# Patient Record
Sex: MALE | Race: WHITE | Employment: OTHER | URBAN - METROPOLITAN AREA
[De-identification: names, ages, dates, MRNs, and addresses within clinical notes are randomized per-mention and may not be internally consistent; named-entity substitution may affect disease eponyms.]

---

## 2018-02-19 ENCOUNTER — OFFICE VISIT (OUTPATIENT)
Dept: PODIATRY | Age: 78
End: 2018-02-19
Payer: MEDICARE

## 2018-02-19 VITALS
WEIGHT: 290 LBS | SYSTOLIC BLOOD PRESSURE: 126 MMHG | DIASTOLIC BLOOD PRESSURE: 76 MMHG | BODY MASS INDEX: 40.6 KG/M2 | HEIGHT: 71 IN

## 2018-02-19 DIAGNOSIS — E11.51 TYPE II DIABETES MELLITUS WITH PERIPHERAL CIRCULATORY DISORDER (HCC): Primary | ICD-10-CM

## 2018-02-19 DIAGNOSIS — I73.9 PVD (PERIPHERAL VASCULAR DISEASE) (HCC): ICD-10-CM

## 2018-02-19 DIAGNOSIS — M79.672 PAIN IN BOTH FEET: ICD-10-CM

## 2018-02-19 DIAGNOSIS — B35.1 DERMATOPHYTOSIS OF NAIL: ICD-10-CM

## 2018-02-19 DIAGNOSIS — M79.671 PAIN IN BOTH FEET: ICD-10-CM

## 2018-02-19 PROCEDURE — 1036F TOBACCO NON-USER: CPT | Performed by: PODIATRIST

## 2018-02-19 PROCEDURE — 4040F PNEUMOC VAC/ADMIN/RCVD: CPT | Performed by: PODIATRIST

## 2018-02-19 PROCEDURE — 11721 DEBRIDE NAIL 6 OR MORE: CPT | Performed by: PODIATRIST

## 2018-02-19 PROCEDURE — G8484 FLU IMMUNIZE NO ADMIN: HCPCS | Performed by: PODIATRIST

## 2018-02-19 PROCEDURE — G8428 CUR MEDS NOT DOCUMENT: HCPCS | Performed by: PODIATRIST

## 2018-02-19 PROCEDURE — 99213 OFFICE O/P EST LOW 20 MIN: CPT | Performed by: PODIATRIST

## 2018-02-19 PROCEDURE — G8417 CALC BMI ABV UP PARAM F/U: HCPCS | Performed by: PODIATRIST

## 2018-02-19 PROCEDURE — 1123F ACP DISCUSS/DSCN MKR DOCD: CPT | Performed by: PODIATRIST

## 2018-09-11 ENCOUNTER — OFFICE VISIT (OUTPATIENT)
Dept: PODIATRY | Age: 78
End: 2018-09-11
Payer: MEDICARE

## 2018-09-11 VITALS — HEIGHT: 69 IN | RESPIRATION RATE: 16 BRPM | WEIGHT: 292 LBS | BODY MASS INDEX: 43.25 KG/M2

## 2018-09-11 DIAGNOSIS — I73.9 PVD (PERIPHERAL VASCULAR DISEASE) (HCC): ICD-10-CM

## 2018-09-11 DIAGNOSIS — E11.51 TYPE II DIABETES MELLITUS WITH PERIPHERAL CIRCULATORY DISORDER (HCC): Primary | ICD-10-CM

## 2018-09-11 DIAGNOSIS — B35.1 DERMATOPHYTOSIS OF NAIL: ICD-10-CM

## 2018-09-11 DIAGNOSIS — M79.671 BILATERAL FOOT PAIN: ICD-10-CM

## 2018-09-11 DIAGNOSIS — M79.672 BILATERAL FOOT PAIN: ICD-10-CM

## 2018-09-11 PROCEDURE — 99213 OFFICE O/P EST LOW 20 MIN: CPT | Performed by: PODIATRIST

## 2018-09-11 PROCEDURE — 4040F PNEUMOC VAC/ADMIN/RCVD: CPT | Performed by: PODIATRIST

## 2018-09-11 PROCEDURE — 1123F ACP DISCUSS/DSCN MKR DOCD: CPT | Performed by: PODIATRIST

## 2018-09-11 PROCEDURE — 1036F TOBACCO NON-USER: CPT | Performed by: PODIATRIST

## 2018-09-11 PROCEDURE — G8427 DOCREV CUR MEDS BY ELIG CLIN: HCPCS | Performed by: PODIATRIST

## 2018-09-11 PROCEDURE — G8417 CALC BMI ABV UP PARAM F/U: HCPCS | Performed by: PODIATRIST

## 2018-09-11 PROCEDURE — 11721 DEBRIDE NAIL 6 OR MORE: CPT | Performed by: PODIATRIST

## 2018-09-11 PROCEDURE — 1101F PT FALLS ASSESS-DOCD LE1/YR: CPT | Performed by: PODIATRIST

## 2019-05-09 ENCOUNTER — OFFICE VISIT (OUTPATIENT)
Dept: PODIATRY | Age: 79
End: 2019-05-09
Payer: MEDICARE

## 2019-05-09 VITALS — HEIGHT: 70 IN | RESPIRATION RATE: 16 BRPM | BODY MASS INDEX: 41.8 KG/M2 | WEIGHT: 292 LBS

## 2019-05-09 DIAGNOSIS — M79.671 BILATERAL FOOT PAIN: ICD-10-CM

## 2019-05-09 DIAGNOSIS — E11.42 DIABETIC POLYNEUROPATHY ASSOCIATED WITH TYPE 2 DIABETES MELLITUS (HCC): ICD-10-CM

## 2019-05-09 DIAGNOSIS — M79.672 BILATERAL FOOT PAIN: ICD-10-CM

## 2019-05-09 DIAGNOSIS — I73.9 PVD (PERIPHERAL VASCULAR DISEASE) (HCC): ICD-10-CM

## 2019-05-09 DIAGNOSIS — E11.51 TYPE II DIABETES MELLITUS WITH PERIPHERAL CIRCULATORY DISORDER (HCC): ICD-10-CM

## 2019-05-09 DIAGNOSIS — B35.1 DERMATOPHYTOSIS OF NAIL: Primary | ICD-10-CM

## 2019-05-09 PROCEDURE — 11721 DEBRIDE NAIL 6 OR MORE: CPT | Performed by: PODIATRIST

## 2019-05-09 PROCEDURE — 4040F PNEUMOC VAC/ADMIN/RCVD: CPT | Performed by: PODIATRIST

## 2019-05-09 PROCEDURE — G8417 CALC BMI ABV UP PARAM F/U: HCPCS | Performed by: PODIATRIST

## 2019-05-09 PROCEDURE — G8428 CUR MEDS NOT DOCUMENT: HCPCS | Performed by: PODIATRIST

## 2019-05-09 PROCEDURE — 1123F ACP DISCUSS/DSCN MKR DOCD: CPT | Performed by: PODIATRIST

## 2019-05-09 PROCEDURE — 1036F TOBACCO NON-USER: CPT | Performed by: PODIATRIST

## 2019-05-09 NOTE — PROGRESS NOTES
800 mg by mouth every 6 hours as needed for Pain      acetaminophen (TYLENOL) 500 MG tablet Take 1,000 mg by mouth every 6 hours as needed for Pain       No current facility-administered medications on file prior to visit. Review of Systems    Review of Systems:   History obtained from chart review and the patient  General ROS: negative for - chills, fatigue, fever, night sweats or weight gain  Constitutional: Negative for chills, diaphoresis, fatigue, fever and unexpected weight change. Musculoskeletal: Positive for arthralgias, gait problem and joint swelling. Neurological ROS: negative for - behavioral changes, confusion, headaches or seizures. Negative for weakness and numbness. Dermatological ROS: negative for - mole changes, rash  Cardiovascular: Negative for leg swelling. Gastrointestinal: Negative for constipation, diarrhea, nausea and vomiting. Objective:  General: AAO x 3 in NAD.     Derm  Toenail Description  Sites of Onychomycosis Involvement (Check affected area)  [x] [x] [x] [x] [x] [x] [x] [x] [x] [x]  5 4 3 2 1 1 2 3 4 5                          Right                                        Left    Thickness  [x] [x] [x] [x] [x] [x] [x] [x] [x] [x]  5 4 3 2 1 1 2 3 4 5                         Right                                        Left    Dystrophic Changes   [x] [x] [x] [x] [x] [x] [x] [x] [x] [x]  5 4 3 2 1 1 2 3 4 5                         Right                                        Left    Color   [x] [x] [x] [x] [x] [x] [x] [x] [x] [x]  5 4 3 2 1 1 2 3 4 5                          Right                                        Left    Incurvation/Ingrowin   [] [] [] [] [] [] [] [] [] []  5 4 3 2 1 1 2 3 4 5                         Right                                        Left    Inflammation/Pain   [x] [x] [x] [x] [x] [x] [x] [x] [x] [x]  5 4 3 2 1 1 2 3 4 5                         Right                                        Left      Dermatologic Exam:  Skin lesion/ulceration Absent . Skin No rashes or nodules noted. .       Musculoskeletal:     1st MPJ ROM decreased, Bilateral.  Muscle strength 5/5, Bilateral.  Pain present upon palpation of toenails 1-5, Bilateral. decreased medial longitudinal arch, Bilateral.  Ankle ROM decreased,Bilateral.    Dorsally contracted digits present digits 2, Bilateral.     Vascular: DP and PT pulses palpable 1/4, Bilateral.  CFT <5 seconds, Bilateral.  Hair growth absent to the level of the digits, Bilateral.  Edema present, Bilateral.  Varicosities absent, Bilateral. Erythema absent, Bilateral    Neurological: Sensation diminshed to light touch to level of digits, Bilateral.  Protective sensation intact 6/10 sites via 5.07/10g Raymond-Yael Monofilament, Bilateral.  negative Tinel's, Bilateral.  negative Valleix sign, Bilateral.      Integument: Warm, dry, supple, Bilateral.  Open lesion absent, Bilateral.  Interdigital maceration absent to web spaces 4, Bilateral.  Nails 1-5 left and 1-5 right thickened > 3.0 mm, dystrophic and crumbly, discolored with subungual debris. Fissures absent, Bilateral.     Visual inspection:  Deformity: hammertoe deformity hali feet  amputation: absent  Skin lesions: absent  Edema: right- 2+ pitting edema, left- 2+ pitting edema    Sensory exam:  Monofilament sensation: abnormal - 6/10 via SW 5.07/10g monofilament to the plantar foot bilateral feet    Pulses: abnormal - 1/4 dorsalis pedis pulse and 1/4 Posterior tibial pulse,   Pinprick: Impaired  Proprioception: Impaired  Vibration (128 Hz): Impaired       DM with PVD       [x]Yes    []No      Assessment:  69 y.o. male with:   Diagnosis Orders   1. Dermatophytosis of nail   DIABETES FOOT EXAM    43431 - [place] DEBRIDEMENT OF NAILS, 6 OR MORE   2. Type II diabetes mellitus with peripheral circulatory disorder (HCC)   DIABETES FOOT EXAM    22251 - NM DEBRIDEMENT OF NAILS, 6 OR MORE   3.  PVD (peripheral vascular disease) ([facility])   DIABETES FOOT EXAM

## 2020-01-21 ENCOUNTER — OFFICE VISIT (OUTPATIENT)
Dept: PODIATRY | Age: 80
End: 2020-01-21
Payer: MEDICARE

## 2020-01-21 VITALS — WEIGHT: 280 LBS | HEIGHT: 70 IN | RESPIRATION RATE: 16 BRPM | BODY MASS INDEX: 40.09 KG/M2

## 2020-01-21 PROCEDURE — 4040F PNEUMOC VAC/ADMIN/RCVD: CPT | Performed by: PODIATRIST

## 2020-01-21 PROCEDURE — G8417 CALC BMI ABV UP PARAM F/U: HCPCS | Performed by: PODIATRIST

## 2020-01-21 PROCEDURE — G8427 DOCREV CUR MEDS BY ELIG CLIN: HCPCS | Performed by: PODIATRIST

## 2020-01-21 PROCEDURE — 1036F TOBACCO NON-USER: CPT | Performed by: PODIATRIST

## 2020-01-21 PROCEDURE — 11721 DEBRIDE NAIL 6 OR MORE: CPT | Performed by: PODIATRIST

## 2020-01-21 PROCEDURE — G8484 FLU IMMUNIZE NO ADMIN: HCPCS | Performed by: PODIATRIST

## 2020-01-21 PROCEDURE — 1123F ACP DISCUSS/DSCN MKR DOCD: CPT | Performed by: PODIATRIST

## 2020-01-21 PROCEDURE — 99213 OFFICE O/P EST LOW 20 MIN: CPT | Performed by: PODIATRIST

## 2020-01-21 NOTE — PROGRESS NOTES
PVD       [x]Yes    []No      X rays 3 views left foot:  DJD of the midfoot with no acute findings seen    Assessment:  67 y.o. male with:   Diagnosis Orders   1. Dermatophytosis of nail  79505 - [place] DEBRIDEMENT OF NAILS, 6 OR MORE    HM DIABETES FOOT EXAM   2. Type II diabetes mellitus with peripheral circulatory disorder (HCC)  35012 - [place] DEBRIDEMENT OF NAILS, 6 OR MORE    HM DIABETES FOOT EXAM   3. PVD (peripheral vascular disease) ([address]  52100 - IA DEBRIDEMENT OF NAILS, 6 OR MORE    HM DIABETES FOOT EXAM   4. Bilateral foot pain  44900 - IA DEBRIDEMENT OF NAILS, 6 OR MORE    HM DIABETES FOOT EXAM   5. Primary osteoarthritis of left foot  XR FOOT LEFT (MIN 3 VIEWS)    HM DIABETES FOOT EXAM           Q7   []Yes    []No                Q8   [x]Yes    []No                     Q9   []Yes    []No    Plan:   Pt was evaluated and examined. Patient was given personalized discharge instructions. X ryas 3 views left foot show the above findings. For the DJD  X rays reviewed labs reviewed  Recommend OTC anti inflammatories. RICE therapy if pain worsens after activity   Recommend proper shoe gear with supportive archs. Nails 1-10 were debrided sharply in length and thickness with a nipper and , without incident. Pt will follow up in 9 weeks or sooner if any problems arise. Diagnosis was discussed with the pt and all of their questions were answered in detail. Proper foot hygiene and care was discussed with the pt. Informed patient on proper diabetic foot care and importance of tight glycemic control. Patient to check feet daily and contact the office with any questions/problems/concerns.    Other comorbidity noted and will be managed by PCP.  1/21/2020    Electronically signed by Billy Lujan DPM on 1/21/2020 at 1:23 PM  1/21/2020

## 2020-06-10 PROBLEM — M72.0 DUPUYTREN'S CONTRACTURE OF LEFT HAND: Status: ACTIVE | Noted: 2020-06-10

## 2020-09-01 RX ORDER — LANOLIN ALCOHOL/MO/W.PET/CERES
3 CREAM (GRAM) TOPICAL NIGHTLY PRN
COMMUNITY

## 2020-09-01 RX ORDER — TADALAFIL 10 MG/1
10 TABLET ORAL PRN
Status: ON HOLD | COMMUNITY
End: 2020-09-02

## 2020-09-02 ENCOUNTER — HOSPITAL ENCOUNTER (OUTPATIENT)
Dept: CARDIAC CATH/INVASIVE PROCEDURES | Age: 80
Discharge: HOME | End: 2020-09-02
Attending: INTERNAL MEDICINE | Admitting: INTERNAL MEDICINE
Payer: MEDICARE

## 2020-09-02 VITALS
OXYGEN SATURATION: 98 % | TEMPERATURE: 97.5 F | BODY MASS INDEX: 41.18 KG/M2 | RESPIRATION RATE: 18 BRPM | SYSTOLIC BLOOD PRESSURE: 159 MMHG | DIASTOLIC BLOOD PRESSURE: 69 MMHG | HEART RATE: 48 BPM | WEIGHT: 278 LBS | HEIGHT: 69 IN

## 2020-09-02 LAB
ANION GAP SERPL CALCULATED.3IONS-SCNC: 13 MMOL/L
BUN BLDV-MCNC: 32 MG/DL (ref 8–23)
BUN/CREAT BLD: 28 (ref 9–20)
CALCIUM SERPL-MCNC: 9.4 MG/DL (ref 8.6–10.4)
CHLORIDE BLD-SCNC: 102 MMOL/L (ref 98–107)
CO2: 22 MMOL/L (ref 20–31)
CREAT SERPL-MCNC: 1.13 MG/DL (ref 0.7–1.2)
GFR AFRICAN AMERICAN: >60 ML/MIN
GFR NON-AFRICAN AMERICAN: >60 ML/MIN
GFR SERPL CREATININE-BSD FRML MDRD: ABNORMAL ML/MIN/{1.73_M2}
GFR SERPL CREATININE-BSD FRML MDRD: ABNORMAL ML/MIN/{1.73_M2}
GLUCOSE BLD-MCNC: 153 MG/DL (ref 70–99)
HCT VFR BLD CALC: 42.3 % (ref 40.7–50.3)
HEMOGLOBIN: 13.8 G/DL (ref 13–17)
MCH RBC QN AUTO: 28.3 PG (ref 25.2–33.5)
MCHC RBC AUTO-ENTMCNC: 32.6 G/DL (ref 28.4–34.8)
MCV RBC AUTO: 86.7 FL (ref 82.6–102.9)
NRBC AUTOMATED: 0 PER 100 WBC
PDW BLD-RTO: 14.6 % (ref 11.8–14.4)
PLATELET # BLD: 195 K/UL (ref 138–453)
PMV BLD AUTO: 9.6 FL (ref 8.1–13.5)
POTASSIUM SERPL-SCNC: 4.4 MMOL/L (ref 3.7–5.3)
RBC # BLD: 4.88 M/UL (ref 4.21–5.77)
SODIUM BLD-SCNC: 137 MMOL/L (ref 135–144)
WBC # BLD: 7.6 K/UL (ref 3.5–11.3)

## 2020-09-02 PROCEDURE — 2580000003 HC RX 258: Performed by: INTERNAL MEDICINE

## 2020-09-02 PROCEDURE — 6360000004 HC RX CONTRAST MEDICATION

## 2020-09-02 PROCEDURE — 93459 L HRT ART/GRFT ANGIO: CPT | Performed by: INTERNAL MEDICINE

## 2020-09-02 PROCEDURE — 36415 COLL VENOUS BLD VENIPUNCTURE: CPT

## 2020-09-02 PROCEDURE — 7100000000 HC PACU RECOVERY - FIRST 15 MIN

## 2020-09-02 PROCEDURE — 85027 COMPLETE CBC AUTOMATED: CPT

## 2020-09-02 PROCEDURE — 80048 BASIC METABOLIC PNL TOTAL CA: CPT

## 2020-09-02 PROCEDURE — 6370000000 HC RX 637 (ALT 250 FOR IP): Performed by: INTERNAL MEDICINE

## 2020-09-02 PROCEDURE — C1894 INTRO/SHEATH, NON-LASER: HCPCS

## 2020-09-02 PROCEDURE — 7100000001 HC PACU RECOVERY - ADDTL 15 MIN

## 2020-09-02 PROCEDURE — 93458 L HRT ARTERY/VENTRICLE ANGIO: CPT | Performed by: INTERNAL MEDICINE

## 2020-09-02 PROCEDURE — 2709999900 HC NON-CHARGEABLE SUPPLY

## 2020-09-02 RX ORDER — ASPIRIN 81 MG/1
81 TABLET, CHEWABLE ORAL ONCE
Status: COMPLETED | OUTPATIENT
Start: 2020-09-02 | End: 2020-09-02

## 2020-09-02 RX ORDER — ATROPINE SULFATE 0.1 MG/ML
INJECTION INTRAVENOUS
Status: DISCONTINUED
Start: 2020-09-02 | End: 2020-09-02 | Stop reason: WASHOUT

## 2020-09-02 RX ORDER — SODIUM CHLORIDE 9 MG/ML
INJECTION, SOLUTION INTRAVENOUS CONTINUOUS
Status: DISCONTINUED | OUTPATIENT
Start: 2020-09-02 | End: 2020-09-08 | Stop reason: HOSPADM

## 2020-09-02 RX ORDER — AMLODIPINE BESYLATE 5 MG/1
5 TABLET ORAL DAILY
Qty: 30 TABLET | Refills: 3 | Status: SHIPPED | OUTPATIENT
Start: 2020-09-02 | End: 2020-11-16

## 2020-09-02 RX ORDER — ACETAMINOPHEN 325 MG/1
650 TABLET ORAL EVERY 4 HOURS PRN
Status: CANCELLED | OUTPATIENT
Start: 2020-09-02

## 2020-09-02 RX ORDER — SODIUM CHLORIDE 0.9 % (FLUSH) 0.9 %
10 SYRINGE (ML) INJECTION PRN
Status: CANCELLED | OUTPATIENT
Start: 2020-09-02

## 2020-09-02 RX ORDER — SODIUM CHLORIDE 0.9 % (FLUSH) 0.9 %
10 SYRINGE (ML) INJECTION EVERY 12 HOURS SCHEDULED
Status: CANCELLED | OUTPATIENT
Start: 2020-09-02

## 2020-09-02 RX ADMIN — ASPIRIN 81 MG: 81 TABLET, CHEWABLE ORAL at 09:06

## 2020-09-02 RX ADMIN — SODIUM CHLORIDE: 9 INJECTION, SOLUTION INTRAVENOUS at 09:03

## 2020-09-02 ASSESSMENT — PAIN SCALES - GENERAL
PAINLEVEL_OUTOF10: 0

## 2020-09-02 ASSESSMENT — PAIN - FUNCTIONAL ASSESSMENT: PAIN_FUNCTIONAL_ASSESSMENT: 0-10

## 2020-09-18 ENCOUNTER — NURSE TRIAGE (OUTPATIENT)
Dept: OTHER | Facility: CLINIC | Age: 80
End: 2020-09-18

## 2020-09-18 NOTE — TELEPHONE ENCOUNTER
Reason for Disposition   MODERATE pain (e.g., interferes with normal activities, limping) and present > 3 days    Answer Assessment - Initial Assessment Questions  1. ONSET: \"When did the pain start?\"       1 week ago  2. LOCATION: \"Where is the pain located? \"   (e.g., around nail, entire toe, at foot joint)       Left big toe  3. PAIN: \"How bad is the pain? \"    (Scale 1-10; or mild, moderate, severe)    -  MILD (1-3): doesn't interfere with normal activities     -  MODERATE (4-7): interferes with normal activities (e.g., work or school) or awakens from sleep, limping     -  SEVERE (8-10): excruciating pain, unable to do any normal activities, unable to walk      moderate  4. APPEARANCE: \"What does the toe look like? \" (e.g., redness, swelling, bruising, pallor)      Swollen, dark around nail  5. CAUSE: \"What do you think is causing the toe pain? \"      Maybe fungus  6. OTHER SYMPTOMS: \"Do you have any other symptoms? \" (e.g., leg pain, rash, fever, numbness)      no  7. PREGNANCY: \"Is there any chance you are pregnant? \" \"When was your last menstrual period? \"      n/a    Protocols used: TOE PAIN-ADULT-[place]    Patient called due to left toe pain and swelling . Warm transfer to scheduling center. All care advice reviewed with patient, all questions and concerns answered. Caller provided care advice and instructed to call back with worsening symptoms. Please do not respond to the triage nurse through this encounter. Any subsequent communication should be directly with the patient.

## 2020-09-21 ENCOUNTER — OFFICE VISIT (OUTPATIENT)
Dept: PODIATRY | Age: 80
End: 2020-09-21
Payer: MEDICARE

## 2020-09-21 VITALS — TEMPERATURE: 97 F | HEIGHT: 69 IN | WEIGHT: 297 LBS | BODY MASS INDEX: 43.99 KG/M2 | RESPIRATION RATE: 16 BRPM

## 2020-09-21 PROCEDURE — 1123F ACP DISCUSS/DSCN MKR DOCD: CPT | Performed by: PODIATRIST

## 2020-09-21 PROCEDURE — 17110 DESTRUCTION B9 LES UP TO 14: CPT | Performed by: PODIATRIST

## 2020-09-21 PROCEDURE — 1036F TOBACCO NON-USER: CPT | Performed by: PODIATRIST

## 2020-09-21 PROCEDURE — G8427 DOCREV CUR MEDS BY ELIG CLIN: HCPCS | Performed by: PODIATRIST

## 2020-09-21 PROCEDURE — 4040F PNEUMOC VAC/ADMIN/RCVD: CPT | Performed by: PODIATRIST

## 2020-09-21 PROCEDURE — 11721 DEBRIDE NAIL 6 OR MORE: CPT | Performed by: PODIATRIST

## 2020-09-21 PROCEDURE — G8417 CALC BMI ABV UP PARAM F/U: HCPCS | Performed by: PODIATRIST

## 2020-09-21 PROCEDURE — 99213 OFFICE O/P EST LOW 20 MIN: CPT | Performed by: PODIATRIST

## 2020-09-21 NOTE — PROGRESS NOTES
[facility]  [facility]  [address] [place] [address]  Dept: [phone]  Dept Fax: [phone]    DIABETIC PROGRESS NOTE  Date of patient's visit: 9/21/2020  Patient's Name:  Rosa Ruby YOB: 1940            Patient Care Team:  Alia Lakhani MD as PCP - 8 Aleksandr Jones DPM as Physician (Podiatry)          Chief Complaint   Patient presents with    Toe Pain       Subjective:   Rosa Ruby comes to clinic for Toe Pain    he is a diabetic and states that  He checks sugar regularly. Pt currently has complaint of thickened, elongated nails that they cannot manage by themselves. Pt's primary care physician is Alia Lakhani MD last seen 9/10/20   Pt's last blood sugar was  153. Pt has a new complaint of right great toe nail and increased painful skin lesion to right foot. Pt has tried changing shoes but it has not helped the pain     No results found for: LABA1C   Complains of numbness in the feet bilat.   Past Medical History:   Diagnosis Date    Atrial fibrillation ([facility] [address]     CAD (coronary artery disease)     CHF (congestive heart failure) (HCC)     Diabetes (HCC)     Diverticulosis     Hyperlipidemia     Obesity        Allergies   Allergen Reactions    Lisinopril      Current Outpatient Medications on File Prior to Visit   Medication Sig Dispense Refill    amLODIPine (NORVASC) 5 MG tablet Take 1 tablet by mouth daily 30 tablet 3    apixaban (ELIQUIS) 5 MG TABS tablet Take 5 mg by mouth 2 times daily      melatonin 3 MG TABS tablet Take 3 mg by mouth nightly as needed      fenofibrate (TRICOR) 145 MG tablet       glimepiride (AMARYL) 4 MG tablet       metFORMIN (GLUCOPHAGE) 500 MG tablet Take 500 mg by mouth 2 times daily (with meals)      aspirin 81 MG tablet Take 81 mg by mouth daily      atorvastatin (LIPITOR) 80 MG tablet Take 80 mg by mouth daily      VITAMIN D, CHOLECALCIFEROL, PO Take by mouth      B Complex Vitamins (VITAMIN B COMPLEX PO) Take by mouth      magnesium 30 MG tablet Take 30 mg by mouth 2 times daily      Omega-3 Fatty Acids (FISH OIL) 1000 MG CAPS Take 3,000 mg by mouth 3 times daily      Cranberry 1000 MG CAPS Take by mouth      ibuprofen (ADVIL;MOTRIN) 200 MG tablet Take 800 mg by mouth every 6 hours as needed for Pain      acetaminophen (TYLENOL) 500 MG tablet Take 1,000 mg by mouth every 6 hours as needed for Pain       No current facility-administered medications on file prior to visit. Review of Systems    Review of Systems:   History obtained from chart review and the patient  General ROS: negative for - chills, fatigue, fever, night sweats or weight gain  Constitutional: Negative for chills, diaphoresis, fatigue, fever and unexpected weight change. Musculoskeletal: Positive for arthralgias, gait problem and joint swelling. Neurological ROS: negative for - behavioral changes, confusion, headaches or seizures. Negative for weakness and numbness. Dermatological ROS: negative for - mole changes, rash  Cardiovascular: Negative for leg swelling. Gastrointestinal: Negative for constipation, diarrhea, nausea and vomiting. Objective:  Dermatologic Exam:  Soft tissue lesion to the plantar right foot sub 5th MTH with central core and petechiae. Pain on palpation of lesion. Right hallux nail is incurvated with increased edema.  No pus     Skin is thin, with flaky sloughing skin as well as decreased hair growth to the lower leg  Small red hemosiderin deposits seen dorsal foot   Musculoskeletal:     1st MPJ ROM decreased, Bilateral.  Muscle strength 5/5, Bilateral.  Pain present upon palpation of toenails 1-5, Bilateral. decreased medial longitudinal arch, Bilateral.  Ankle ROM decreased,Bilateral.    Dorsally contracted digits present digits 2, Bilateral.     Vascular: DP pulses 1/4 bilateral.  PT pulses 0/4 bilateral.   CFT <5 seconds, Bilateral.  Hair growth absent to the level of the digits, Bilateral.  Edema present, Bilateral.  Varicosities absent, Bilateral. Erythema absent, Bilateral    Neurological: Sensation diminshed to light touch to level of digits, Bilateral.  Protective sensation intact 6/10 sites via 5.07/10g Wells-Yael Monofilament, Bilateral.  negative Tinel's, Bilateral.  negative Valleix sign, Bilateral.      Integument: Warm, dry, supple, Bilateral.  Open lesion absent, Bilateral.  Interdigital maceration absent to web spaces 4, Bilateral.  Nails 1-5 left and 1-5 right thickened > 3.0 mm, dystrophic and crumbly, discolored with subungual debris. Fissures absent, Bilateral.   General: AAO x 3 in NAD.     Derm  Toenail Description  Sites of Onychomycosis Involvement (Check affected area)  [x] [x] [x] [x] [x] [x] [x] [x] [x] [x]  5 4 3 2 1 1 2 3 4 5                          Right                                        Left    Thickness  [x] [x] [x] [x] [x] [x] [x] [x] [x] [x]  5 4 3 2 1 1 2 3 4 5                         Right                                        Left    Dystrophic Changes   [x] [x] [x] [x] [x] [x] [x] [x] [x] [x]  5 4 3 2 1 1 2 3 4 5                         Right                                        Left    Color   [x] [x] [x] [x] [x] [x] [x] [x] [x] [x]  5 4 3 2 1 1 2 3 4 5                          Right                                        Left    Incurvation/Ingrowin   [] [] [] [] [] [] [] [] [] []  5 4 3 2 1 1 2 3 4 5                         Right                                        Left    Inflammation/Pain   [x] [x] [x] [x] [x] [x] [x] [x] [x] [x]  5 4 3 2 1 1 2 3 4 5                         Right                                        Left        Visual inspection:  Deformity: hammertoe deformity hali feet  amputation: absent  Skin lesions: as above  Edema: right- 2+ pitting edema, left- 2+ pitting edema    Sensory exam:  Monofilament sensation: abnormal - 6/10 via SW 5.07/10g monofilament to the plantar foot bilateral feet    Pulses: abnormal - 1/4 dorsalis pedis pulse and 1/4 Posterior tibial pulse,   Pinprick: Impaired  Proprioception: Impaired  Vibration (128 Hz): Impaired       DM with PVD       [x]Yes    []No      Assessment:  79 y.o. male with:   Diagnosis Orders   1. Type II diabetes mellitus with peripheral circulatory disorder (HCC)  92757 - CT DEBRIDEMENT OF NAILS, 6 OR MORE   2. PVD (peripheral vascular disease) ([facility] [address]  89028 - CT DEBRIDEMENT OF NAILS, 6 OR MORE   3. Bilateral foot pain  72341 - CT DEBRIDEMENT OF NAILS, 6 OR MORE    01786 - CT DESTRUCTION BENIGN LESIONS UP TO 14   4. Dermatophytosis of nail  83369 - CT DEBRIDEMENT OF NAILS, 6 OR MORE   5. Benign neoplasm of skin of right foot  89250 - CT DESTRUCTION BENIGN LESIONS UP TO 14       Q7   []Yes    []No                Q8   [x]Yes    []No                     Q9   []Yes    []No    Plan:   Pt was evaluated and examined. Patient was given personalized discharge instructions. Slant back procedure perform on nail border using nail nipper to patients tolerance. Advised pt to consider nail avulsion at next visit if symptoms persist or worsen. Pt to monitor for increased signs of infection such as erythema, edema and drainage. The lesion was partially excised and Pyrogallic acid was applied under occlusion. The patient will leave in place for 24-48 hours and than remove. The patient tolerated the procedure well and without complication. Nails 1-10 were debrided sharply in length and thickness with a nipper and , without incident. Pt will follow up in 9 weeks or sooner if any problems arise. Diagnosis was discussed with the pt and all of their questions were answered in detail. Proper foot hygiene and care was discussed with the pt. Informed patient on proper diabetic foot care and importance of tight glycemic control. Patient to check feet daily and contact the office with any questions/problems/concerns.    Other comorbidity noted and will be managed by PCP.  9/21/2020    Electronically signed by Janes Hahn DPM on 9/21/2020 at 11:21 AM  9/21/2020

## 2020-10-13 ENCOUNTER — OFFICE VISIT (OUTPATIENT)
Dept: ORTHOPEDIC SURGERY | Age: 80
End: 2020-10-13
Payer: MEDICARE

## 2020-10-13 VITALS — TEMPERATURE: 97.1 F | WEIGHT: 297 LBS | HEIGHT: 69 IN | BODY MASS INDEX: 43.99 KG/M2 | RESPIRATION RATE: 12 BRPM

## 2020-10-13 PROCEDURE — 4040F PNEUMOC VAC/ADMIN/RCVD: CPT | Performed by: ORTHOPAEDIC SURGERY

## 2020-10-13 PROCEDURE — G8427 DOCREV CUR MEDS BY ELIG CLIN: HCPCS | Performed by: ORTHOPAEDIC SURGERY

## 2020-10-13 PROCEDURE — 99204 OFFICE O/P NEW MOD 45 MIN: CPT | Performed by: ORTHOPAEDIC SURGERY

## 2020-10-13 PROCEDURE — 1036F TOBACCO NON-USER: CPT | Performed by: ORTHOPAEDIC SURGERY

## 2020-10-13 PROCEDURE — G8484 FLU IMMUNIZE NO ADMIN: HCPCS | Performed by: ORTHOPAEDIC SURGERY

## 2020-10-13 PROCEDURE — G8417 CALC BMI ABV UP PARAM F/U: HCPCS | Performed by: ORTHOPAEDIC SURGERY

## 2020-10-13 PROCEDURE — 1123F ACP DISCUSS/DSCN MKR DOCD: CPT | Performed by: ORTHOPAEDIC SURGERY

## 2020-10-13 NOTE — LETTER
interested in proceeding with this latter option, as he states he has a very difficult time with shoe wear currently. At today's visit states he would like to proceed after Thanksgiving. Prior to proceeding with surgery, we will need anticoagulation plan to wean him off of his Eliquis. The patient wishes to proceed with the recommendations as above. Orders/referrals were placed as below at today's visit. He was referred to prosthetics orthotics to obtain a rocker-bottom custom shoe that would be filled extrawide to accommodate his foot shape. At today's visit, the patient was ordered a walker, and a rolling knee scooter. I also ordered physical therapy for the patient to hep reinforce/teach the relevant weight bearing precautions, to help allow for safe transfers and early mobilization, as well as effectively utilize DME. Surgical booking paperwork was completed and submitted. All questions were answered and the patient agrees with the above plan. The patient will return to clinic Preoperatively. At his preoperative visit, I will follow-up on his medical and cardiac clearance, as well as his anticoagulation plan. Thank you in advance! I look forward to serving you and your patients again in the future. Please don't hesitate to contact me at my mobile number .         Avis Watson MD  Orthopedic Surgery, Foot and Ankle

## 2020-10-13 NOTE — PROGRESS NOTES
[facility] [address] [facility]  Emilee Patel [facility], [place] [address].  [address] [place] [address]  Dept: [phone]    Ambulatory Orthopedic Consult      CHIEF COMPLAINT:    Chief Complaint   Patient presents with    Foot Pain     Bilateral Foot    Knee Pain     Left Knee       HISTORY OF PRESENT ILLNESS:      The patient is a 79 y.o. male who is being seen for consultation and evaluation of pain at the dorsal midfoot (on the right greater than left foot), which began many years ago atraumatically. The pain is described mainly with mechanical terms (dull/sharp/throbbing). The pain is worse with activity and better with rest. The patient reports a progressive course. The patient has tried:      [x]  rest/activity modification          [x]  NSAIDs      []  opiates      [x]  orthotics        [x]  change in shoes   []  home exercises  []  physical therapy      []  CAM boot     []  brace:    [x]  injection:       []  surgery:      The patient also reports pain in his left anterior knee, for which he has tried a knee brace. REVIEW OF SYSTEMS:  Constitutional: Negative for fever. HENT: Negative for tinnitus. Eyes: Negative for pain. Respiratory: Negative for shortness of breath. Cardiovascular: Negative for chest pain. Gastrointestinal: Negative for abdominal pain. Genitourinary: Negative for dysuria. Skin: Negative for rash. Neurological: Negative for headaches. Hematological: Does not bruise/bleed easily. Musculoskeletal: See HPI for pertinent positives     Past Medical History:    He  has a past medical history of Atrial fibrillation ([address] CAD (coronary artery disease), CHF (congestive heart failure) ([facility] [address] Diabetes ([facility] [address] Diverticulosis, Hyperlipidemia, and Obesity. Past Surgical History:    He  has a past surgical history that includes Cardiac surgery; Appendectomy; joint replacement; other surgical history; Colonoscopy; and Cardiac catheterization (09/02/2020).      Current Medications:     Current Outpatient Medications:     diclofenac sodium (VOLTAREN) 1 % GEL, Apply 4 g topically 4 times daily as needed for Pain, Disp: 100 g, Rfl: 0    amLODIPine (NORVASC) 5 MG tablet, Take 1 tablet by mouth daily, Disp: 30 tablet, Rfl: 3    apixaban (ELIQUIS) 5 MG TABS tablet, Take 5 mg by mouth 2 times daily, Disp: , Rfl:     melatonin 3 MG TABS tablet, Take 3 mg by mouth nightly as needed, Disp: , Rfl:     fenofibrate (TRICOR) 145 MG tablet, , Disp: , Rfl:     glimepiride (AMARYL) 4 MG tablet, , Disp: , Rfl:     metFORMIN (GLUCOPHAGE) 500 MG tablet, Take 500 mg by mouth 2 times daily (with meals), Disp: , Rfl:     aspirin 81 MG tablet, Take 81 mg by mouth daily, Disp: , Rfl:     atorvastatin (LIPITOR) 80 MG tablet, Take 80 mg by mouth daily, Disp: , Rfl:     VITAMIN D, CHOLECALCIFEROL, PO, Take by mouth, Disp: , Rfl:     B Complex Vitamins (VITAMIN B COMPLEX PO), Take by mouth, Disp: , Rfl:     magnesium 30 MG tablet, Take 30 mg by mouth 2 times daily, Disp: , Rfl:     Omega-3 Fatty Acids (FISH OIL) 1000 MG CAPS, Take 3,000 mg by mouth 3 times daily, Disp: , Rfl:     Cranberry 1000 MG CAPS, Take by mouth, Disp: , Rfl:     ibuprofen (ADVIL;MOTRIN) 200 MG tablet, Take 800 mg by mouth every 6 hours as needed for Pain, Disp: , Rfl:     acetaminophen (TYLENOL) 500 MG tablet, Take 1,000 mg by mouth every 6 hours as needed for Pain, Disp: , Rfl:      Allergies:    Lisinopril    Family History:  family history is not on file. Social History:   Social History     Occupational History    Not on file   Tobacco Use    Smoking status: Former Smoker     Last attempt to quit: 1975     Years since quittin.8    Smokeless tobacco: Never Used   Substance and Sexual Activity    Alcohol use:  Yes     Alcohol/week: 0.0 standard drinks     Comment: occasional     Drug use: No    Sexual activity: Yes     Partners: Female     Occupation: Retired      OBJECTIVE:  Temp 97.1 °F (36.2 °C)   Resp 12   Ht 5' 9\" (1.753 m)   Wt 297 lb (134.7 kg)   BMI 43.86 kg/m²    Psych: alert and oriented to person, time, and place  Cardio:  well perfused extremities  Resp:  normal respiratory effort  Skin:  no cyanosis  Hem/lymph:  no lymphedema  Neuro:  sensation to light touch grossly intact throughout all nerve distributions in the foot   Musculoskeletal:    RLE:  Alignment:  Heel neutral   Vascular: Toes warm and well perfused, compartments soft/compressible. No significant swelling of foot. Skin: Intact without rash/lesions/AV malformations. Strength: Able to fire/perform the following with appropriate strength:    [x]  Tib Ant:     [x]  Gastroc-Soleus:         [x]  Inversion:    [x]  Eversion:         [x]  FHL:     [x]  EHL:      Motion:  Normal for the following joints:    [x]  Ankle:     [x]  Subtalar:        [x]  1st MTP:      []  1st TMT:            Tenderness to Palpation:    Tenderness to palpation: Fifth metatarsal head greater than dorsal midfoot diffusely  -Gastroc equinus      LLE:  Alignment:  Heel neutral   Vascular: Toes warm and well perfused, compartments soft/compressible. No significant swelling of foot. Skin: Intact without rash/lesions/AV malformations.   Strength: Able to fire/perform the following with appropriate strength:    [x]  Tib Ant:     [x]  Gastroc-Soleus:         [x]  Inversion:    [x]  Eversion:         [x]  FHL:     [x]  EHL:      Motion:  Normal for the following joints:    [x]  Ankle:     [x]  Subtalar:        [x]  1st MTP:      []  1st TMT:            Tenderness to Palpation:    Tenderness to palpation:  dorsal midfoot diffusely  -Gastroc equinus      RADIOLOGY:   10/13/2020 FINDINGS:  Three weightbearing views (AP, Mortise, and Lateral) of the bilateral ankle and three weightbearing views (AP, Oblique, Lateral) of the bilateral foot and 4 weightbearing views (AP, tunnel, lateral, and sunrise view) of the left knee were obtained in the office today and reviewed, revealing no acute fracture, dislocation, or radioopaque foreign body/tumor. Degenerative changes of midfoot diffusely bilaterally with joint narrowing, sclerosis, and osteophytes. Bilateral tailor bunion deformity, worse on the right than on the left with metatarsus adductus and increased MITZI. Significant tricompartmental degenerative changes of the left knee with joint space narrowing, sclerosis, and osteophytes. IMPRESSION:  No acute fracture/dislocation. Degenerative changes as above. Electronically signed by Benton Cross MD      ASSESSMENT AND PLAN:  Zina Thomas was seen today for Foot Pain (Bilateral Foot) and Knee Pain (Left Knee)  The primary encounter diagnosis was Arthritis of midfoot. Diagnoses of Tailor's bunion of right foot, Arthritis of knee, and Gastrocnemius equinus of right lower extremity were also pertinent to this visit. Body mass index is 43.86 kg/m². He has right foot pain secondary to a tailor's bunion (with a prominent metatarsal head as well as an increased MITZI) as well as some underlying midfoot arthritis, greater than left foot pain secondary to midfoot arthritis. He also has left knee pain secondary to arthritis. Notably, he has a somewhat complex past medical history including but not limited to the following:  Coronary artery disease status post quadruple bypass, non-insulin-dependent diabetes with polyneuropathy. He currently takes Eliquis twice daily. I had a long discussion today with the patient about the likely diagnosis and its natural history, physical exam and imaging findings, as well as treatment options in detail. We discussed rest/activity modification, swelling control, NSAIDs/Acetaminophen/topical anesthetics, orthotics/shoewear modification, bracing/immobilization, injections, and physical therapy. Surgically, we discussed surgery for his right fifth tailor's bunion, and we discussed multiple options.   We discussed the possibility of an osteotomy and midfoot fusion, however this would necessitate him being nonweightbearing. We discussed a simple condylectomy with bursectomy (and he would also most likely benefit from a gastroc recession) as this would allow him to weight-bear very quickly, and would have a much faster recovery and avoid prolonged immobilization. At this point, he is interested in proceeding with this latter option, as he states he has a very difficult time with shoe wear currently. At today's visit states he would like to proceed after Thanksgiving. Prior to proceeding with surgery, we will need anticoagulation plan to wean him off of his Eliquis. The patient wishes to proceed with the recommendations as above. Orders/referrals were placed as below at today's visit. He was referred to prosthetics orthotics to obtain a rocker-bottom custom shoe that would be filled extrawide to accommodate his foot shape. At today's visit, the patient was ordered a walker, and a rolling knee scooter. I also ordered physical therapy for the patient to hep reinforce/teach the relevant weight bearing precautions, to help allow for safe transfers and early mobilization, as well as effectively utilize DME. Surgical booking paperwork was completed and submitted. All questions were answered and the patient agrees with the above plan. The patient will return to clinic Preoperatively. At his preoperative visit, I will follow-up on his medical and cardiac clearance, as well as his anticoagulation plan. No follow-ups on file. No orders of the defined types were placed in this encounter. Orders Placed This Encounter   Procedures    XR KNEE LEFT (MIN 4 VIEWS)     4V L Knee     Standing Status:   Future     Number of Occurrences:   1     Standing Expiration Date:   11/13/2020     Order Specific Question:   Reason for exam:     Answer:   Knee Pain    DME Order for Orthosis as OP     Eval and treat.      Please provide:  Stiff soled shoes with rocker bottom; extra wide to accomodate    Ildefonso Taylor MD  Orthopedic Surgery, Foot and Ankle         Ildefonso Taylor MD  Orthopedic Surgery, Foot and Ankle        Please excuse any typos/errors, as this note was created with the assistance of voice recognition software. While intending to generate a document that actually reflects the content of the visit, the document can still have some errors including those of syntax and sound-a-like substitutions which may escape proof reading. In such instances, actual meaning can be extrapolated by context.

## 2020-11-16 ENCOUNTER — HOSPITAL ENCOUNTER (OUTPATIENT)
Dept: PREADMISSION TESTING | Age: 80
Discharge: HOME | End: 2020-11-20
Payer: MEDICARE

## 2020-11-16 VITALS
HEART RATE: 63 BPM | BODY MASS INDEX: 44.15 KG/M2 | OXYGEN SATURATION: 97 % | RESPIRATION RATE: 16 BRPM | WEIGHT: 298.06 LBS | TEMPERATURE: 96.8 F | HEIGHT: 69 IN | SYSTOLIC BLOOD PRESSURE: 176 MMHG | DIASTOLIC BLOOD PRESSURE: 67 MMHG

## 2020-11-16 LAB
ABSOLUTE EOS #: 0.2 K/UL (ref 0–0.44)
ABSOLUTE IMMATURE GRANULOCYTE: 0.04 K/UL (ref 0–0.3)
ABSOLUTE LYMPH #: 2.17 K/UL (ref 1.1–3.7)
ABSOLUTE MONO #: 0.47 K/UL (ref 0.1–1.2)
ANION GAP SERPL CALCULATED.3IONS-SCNC: 11 MMOL/L (ref 9–17)
BASOPHILS # BLD: 1 % (ref 0–2)
BASOPHILS ABSOLUTE: 0.07 K/UL (ref 0–0.2)
BUN BLDV-MCNC: 21 MG/DL (ref 8–23)
BUN/CREAT BLD: 19 (ref 9–20)
CALCIUM SERPL-MCNC: 9.6 MG/DL (ref 8.6–10.4)
CHLORIDE BLD-SCNC: 104 MMOL/L (ref 98–107)
CO2: 24 MMOL/L (ref 20–31)
CREAT SERPL-MCNC: 1.11 MG/DL (ref 0.7–1.2)
DIFFERENTIAL TYPE: ABNORMAL
EOSINOPHILS RELATIVE PERCENT: 2 % (ref 1–4)
ESTIMATED AVERAGE GLUCOSE: 166 MG/DL
GFR AFRICAN AMERICAN: >60 ML/MIN
GFR NON-AFRICAN AMERICAN: >60 ML/MIN
GFR SERPL CREATININE-BSD FRML MDRD: ABNORMAL ML/MIN/{1.73_M2}
GFR SERPL CREATININE-BSD FRML MDRD: ABNORMAL ML/MIN/{1.73_M2}
GLUCOSE BLD-MCNC: 118 MG/DL (ref 70–99)
HBA1C MFR BLD: 7.4 % (ref 4–6)
HCT VFR BLD CALC: 44.8 % (ref 40.7–50.3)
HEMOGLOBIN: 14.7 G/DL (ref 13–17)
IMMATURE GRANULOCYTES: 1 %
LYMPHOCYTES # BLD: 26 % (ref 24–43)
MCH RBC QN AUTO: 28.8 PG (ref 25.2–33.5)
MCHC RBC AUTO-ENTMCNC: 32.8 G/DL (ref 28.4–34.8)
MCV RBC AUTO: 87.7 FL (ref 82.6–102.9)
MONOCYTES # BLD: 6 % (ref 3–12)
NRBC AUTOMATED: 0 PER 100 WBC
PDW BLD-RTO: 14.2 % (ref 11.8–14.4)
PLATELET # BLD: 221 K/UL (ref 138–453)
PLATELET ESTIMATE: ABNORMAL
PMV BLD AUTO: 10.2 FL (ref 8.1–13.5)
POTASSIUM SERPL-SCNC: 4.4 MMOL/L (ref 3.7–5.3)
RBC # BLD: 5.11 M/UL (ref 4.21–5.77)
RBC # BLD: ABNORMAL 10*6/UL
SEG NEUTROPHILS: 64 % (ref 36–65)
SEGMENTED NEUTROPHILS ABSOLUTE COUNT: 5.53 K/UL (ref 1.5–8.1)
SODIUM BLD-SCNC: 139 MMOL/L (ref 135–144)
WBC # BLD: 8.5 K/UL (ref 3.5–11.3)
WBC # BLD: ABNORMAL 10*3/UL

## 2020-11-16 PROCEDURE — 83036 HEMOGLOBIN GLYCOSYLATED A1C: CPT

## 2020-11-16 PROCEDURE — 93005 ELECTROCARDIOGRAM TRACING: CPT | Performed by: ANESTHESIOLOGY

## 2020-11-16 PROCEDURE — 80048 BASIC METABOLIC PNL TOTAL CA: CPT

## 2020-11-16 PROCEDURE — 36415 COLL VENOUS BLD VENIPUNCTURE: CPT

## 2020-11-16 PROCEDURE — 85025 COMPLETE CBC W/AUTO DIFF WBC: CPT

## 2020-11-16 RX ORDER — ACETAMINOPHEN 500 MG
1000 TABLET ORAL ONCE
Status: CANCELLED | OUTPATIENT
Start: 2020-12-03

## 2020-11-16 NOTE — PRE-PROCEDURE INSTRUCTIONS
ARRIVE AT [facility] [address] [address] ON Thursday, December 3rd at 08:00 AM    covid screening is on 11/29/2020 at 10:50    Day of the surgery call [phone] and  They will instuct you on what to do    Continue to take your home medications as you normally do up to and including the night before surgery with the exception of any blood thinning medications. Please stop any blood thinning medications as directed by your surgeon or prescribing physician. Failure to stop certain medications may interfere with your scheduled surgery. These may include:  Aspirin, Warfarin (Coumadin), Clopidogrel (Plavix), Ibuprofen (Motrin, Advil), Naproxen (Aleve), Meloxicam (Mobic), Celecoxib (Celebrex), Eliquis, Pradaxa, Xarelto, Effient, Fish Oil, Herbal supplements. Stop motrin and omega 3 one week prior to surgery. Stop aspirin and eliquis according to your cardiologist, tylenol is okay to take    If you are diabetic, do not take any of your diabetic medications by mouth the morning of surgery. If you are taking insulin contact the doctor that manages your diabetes for instructions about any changes to your insulin dosages the day before surgery. Do not inject insulin or other injectable diabetic medications the morning of surgery unless otherwise instructed by the doctor who manages your diabetes. Please take the following medication(s) the day of surgery with a small sip of water:    Please use your inhalers at home the day of surgery. PREPARING FOR YOUR SURGERY:     Before surgery, you can play an important role in your own health. Because skin is not sterile, we need to be sure that your skin is as free of germs as possible before surgery by carefully washing before surgery. Preparing or prepping skin before surgery can reduce the risk of a surgical site infection.   Do not shave the area of your body where your surgery will be performed unless you received specific permission from your physician.     You will need to shower at home the night before surgery and the morning of surgery with a special soap called chlorhexidine gluconate (CHG*). *Not to be used by people allergic to Chlorhexidine Gluconate (CHG). Following these instructions will help you be sure that your skin is clean before surgery. Instructions on cleaning your skin before surgery: The night before your surgery:      You will need to shower with warm water (not hot) and the CHG soap.  Use a clean wash cloth and a clean towel. Have clean clothes available to put on after the shower.    First wash your hair with regular shampoo. Rinse your hair and body thoroughly to remove the shampoo. [facility] Wash your face with your regular soap or water only. Thoroughly rinse your body with warm water from the neck down.  Turn water off to prevent rinsing the soap off too soon.  With a clean wet washcloth and half of the CHG soap in the bottle, lather your entire body from the neck down. Do not use CHG soap near your eyes or ears to avoid injury to those areas.  Wash thoroughly, paying special attention to the area where your surgery will be performed.  Wash your body gently for five (5) minutes. Avoid scrubbing your skin too hard.  Turn the water back on and rinse your body thoroughly.  Pat yourself dry with a clean, soft towel. Do not apply lotion, cream or powder.  Dress with clean freshly washed clothes. The morning of surgery:     Repeat shower following steps above - using remaining half of CHG soap in bottle. Patient Instructions:    [facility] If you are having any type of anesthesia you are to have nothing to eat or drink after midnight the night before your surgery. This includes gum, mints, water or smoking or chewing tobacco.  The only exception to this is a small sip of water to take with any morning dose of heart, blood pressure, or seizure medications.   No alcoholic beverages for 24 hours prior to surgery.  Bring your eyeglasses and case with you. No contacts are to be worn the day of surgery. You also may bring your hearing aids. Most surgical procedures involving anesthesia will require that you remove your dentures prior to surgery.  If you are on C-PAP or Bi-PAP at home and plan on staying in the hospital overnight for your surgery please bring the machine with you. · Do not wear any jewelry or body piercings day of surgery. Also, NO lotion, perfume or deodorant to be used the day of surgery. No nail polish on the operative extremity (arm/leg surgeries)    ·   If you are staying overnight with us, please bring a small bag of personal items.  Please wear loose, comfortable clothing. If you are potentially going to have a cast or brace bring clothing that will fit over them.  In case of illness - If you have cold or flu like symptoms (high fever, runny nose, sore throat, cough, etc.) rash, nausea, vomiting, loose stools, and/or recent contact with someone who has a contagious disease (chicken pox, measles, etc.) Please call your doctor before coming to the hospital.     If your child is having surgery please make arrangements for any other children to be cared for at home on the day of surgery. Other children are not permitted in recovery room and we want you to be able to spend time with the patient. If other arrangements are not available then we suggest that you have a second adult to stay in the waiting room. Day of Surgery/Procedure:    As a patient at [place] [address] you can expect quality medical and nursing care that is centered on your individual needs. Our goal is to make your surgical experience as comfortable as possible    . Transportation After Your Surgery/Procedure:     You will need a friend or family member to drive you home after your procedure. Your  must be 25years of age or older and able to sign off on your discharge instructions. A taxi cab or any other form of public transportation is not acceptable. Your friend or family member must stay at the hospital throughout your procedure. Someone must remain with you for the first 24 hours after your surgery if you receive anesthesia or medication. If you do not have someone to stay with you, your procedure may be cancelled.       If you have any other questions regarding your procedure or the day of surgery, please call [phone]      _________________________  ____________________________  Signature (Patient)              Signature (Provider) & date

## 2020-11-17 LAB
EKG ATRIAL RATE: 242 BPM
EKG P AXIS: -46 DEGREES
EKG Q-T INTERVAL: 490 MS
EKG QRS DURATION: 152 MS
EKG QTC CALCULATION (BAZETT): 446 MS
EKG R AXIS: 69 DEGREES
EKG T AXIS: -51 DEGREES
EKG VENTRICULAR RATE: 50 BPM

## 2020-11-17 PROCEDURE — 93010 ELECTROCARDIOGRAM REPORT: CPT | Performed by: INTERNAL MEDICINE

## 2020-11-19 ENCOUNTER — HOSPITAL ENCOUNTER (OUTPATIENT)
Dept: PHYSICAL THERAPY | Facility: CLINIC | Age: 80
Setting detail: THERAPIES SERIES
Discharge: HOME | End: 2020-11-19
Payer: MEDICARE

## 2020-11-19 PROCEDURE — 97116 GAIT TRAINING THERAPY: CPT

## 2020-11-19 PROCEDURE — 97161 PT EVAL LOW COMPLEX 20 MIN: CPT

## 2020-11-19 NOTE — CONSULTS
[x] [facility]  Psychiatric [address]   [place]: [phone]  F: [phone]     Physical Therapy Evaluation    Date:  2020   Patient: Kennedy Bennett  : [birth year]  MRN: [id number]  Physician: Dr Melissa Dunbar: Medicare (VERIFICATION PENDING)  Medical Diagnosis: RLE bunionette  Rehab Codes: M21.621  Onset date:    Next Dr's appt. : 12-3-20      Subjective:   CC/HPI:  Pt with bilat foot and left knee pain. Dr Yosi Guzman consult, plan for surgery as follows: We discussed a simple condylectomy with bursectomy (and he would also most likely benefit from a gastroc recession) as this would allow him to weight-bear very quickly, and would have a much faster recovery and avoid prolonged immobilization. At this point, he is interested in proceeding with this latter option, as he states he has a very difficult time with shoe wear currently      PMHx: See chart     Tests: [x] X-Ray: 10/13/2020 FINDINGS:  Three weightbearing views (AP, Mortise, and Lateral) of the bilateral ankle and three weightbearing views (AP, Oblique, Lateral) of the bilateral foot and 4 weightbearing views (AP, tunnel, lateral, and sunrise view) of the left knee were obtained in the office today and reviewed, revealing no acute fracture, dislocation, or radioopaque foreign body/tumor. Degenerative changes of midfoot diffusely bilaterally with joint narrowing, sclerosis, and osteophytes. Bilateral tailor bunion deformity, worse on the right than on the left with metatarsus adductus and increased MITZI. Significant tricompartmental degenerative changes of the left knee with joint space narrowing, sclerosis, and osteophytes.     [] MRI:    [] Other:     Medications:  [] Refer to full medical record [] None [] Other:  Allergies:       [] Refer to full medical record [] None [] Other:        Martial Status Lives with wife   Home type 1 story    Stairs from outside 5 rails on both sides Stairs inside none   Employement Retired   Job status    Work Activities/duties     Recreational Activities yard       Pain present? yes   Location RLE foot    Pain Rating currently 0/10   Pain at worse 9/10   Pain at best 0/10   Description of pain sharp   Altered Sensation none   What makes it worse walking   What makes it better Rest, release of pressure on the foot from cutting open his shoes    Pain altered treatment/action    Symptom progression    Sleep              Objective:    ROM/Strength: WFLs, limited ankle DF bilat       Educated pt on use of DME, including: (Check box of device used)     [x] Knee Scooter: Instructed pt on appropriate height being even with contralateral knee. Reviewed safety concerns such as not gliding on turns and using breaks appropriately. [x] Rolling Walker: Instructed pt on appropriate height of even with wrists with arms at resting at side. Educated pt on safe gait pattern while using walker. Explained to pt the difference between a 4 wheeled walker and rolling walker and how a rolling walker is most appropriate for a NWB pt.      [] Axillary Crutches: Instructed pt on appropriate height of two finger width between crutch and axilla, as well as elbow flexion of approximately 20deg. Educated pt on how to adjust both crutch and handle height. [x] Stairs:  Discussed need to be NWB on steps for entering/exiting his home            Comments: Pt instructed in proper use of walker, knee scooter for RLE NWB status. Patient expressed his concern on NWB status, does not feel he is going to be able to do it. Attempted NWB with walker, patient unable to hop forward and maintain NWB status. Cued for proper technique, patient unable to perform. Pt refused to try stairs, he reports that he does not feel safe or that he is going to be able to do it NWB. Discussed with patient that he will need to find a way to get in to his home and maintain NWB status.  At this time he is not Complexity [] Moderate Complexity [] High Complexity       [x]  DME note sent to Dr. Adriane Ji Charges: Mins Units   [x] Evaluation       [x]  Low       []  Moderate       []  High 20 1   []  Modalities     []  Ther Exercise     []  Manual Therapy     []  Ther Activities     []  Aquatics     []  Vasocompression     [x]  Gait 10 1     TOTAL TREATMENT TIME: 30    Time in:1000   Time Out:1030    Electronically signed by: Keli Neal PT        Physician Signature:________________________________Date:__________________  By signing above or cosigning this note, I have reviewed this plan of care and certify a need for medically necessary rehabilitation services.      *PLEASE SIGN ABOVE AND FAX BACK ALL PAGES*

## 2020-11-25 ENCOUNTER — OFFICE VISIT (OUTPATIENT)
Dept: ORTHOPEDIC SURGERY | Age: 80
End: 2020-11-25
Payer: MEDICARE

## 2020-11-25 VITALS
HEART RATE: 63 BPM | HEIGHT: 69 IN | BODY MASS INDEX: 44.14 KG/M2 | WEIGHT: 298 LBS | RESPIRATION RATE: 16 BRPM | TEMPERATURE: 97.1 F

## 2020-11-25 PROCEDURE — 99213 OFFICE O/P EST LOW 20 MIN: CPT | Performed by: ORTHOPAEDIC SURGERY

## 2020-11-25 PROCEDURE — G8484 FLU IMMUNIZE NO ADMIN: HCPCS | Performed by: ORTHOPAEDIC SURGERY

## 2020-11-25 PROCEDURE — G8417 CALC BMI ABV UP PARAM F/U: HCPCS | Performed by: ORTHOPAEDIC SURGERY

## 2020-11-25 PROCEDURE — G8427 DOCREV CUR MEDS BY ELIG CLIN: HCPCS | Performed by: ORTHOPAEDIC SURGERY

## 2020-11-25 PROCEDURE — 1036F TOBACCO NON-USER: CPT | Performed by: ORTHOPAEDIC SURGERY

## 2020-11-25 PROCEDURE — 4040F PNEUMOC VAC/ADMIN/RCVD: CPT | Performed by: ORTHOPAEDIC SURGERY

## 2020-11-25 PROCEDURE — 1123F ACP DISCUSS/DSCN MKR DOCD: CPT | Performed by: ORTHOPAEDIC SURGERY

## 2020-11-25 NOTE — PROGRESS NOTES
[facility]  [facility] Belinda Dewitt  [address] [id number]  Dept: [phone]    Ambulatory Orthopedic Consult      CHIEF COMPLAINT:    Chief Complaint   Patient presents with    Foot Pain     Right Foot       HISTORY OF PRESENT ILLNESS:      The patient is a 79 y.o. male who is being seen for consultation and evaluation of pain at the dorsal midfoot (on the right greater than left foot), which began many years ago atraumatically. The pain is described mainly with mechanical terms (dull/sharp/throbbing). The pain is worse with activity and better with rest. The patient reports a progressive course. The patient has tried:      [x]  rest/activity modification          [x]  NSAIDs      []  opiates      [x]  orthotics        [x]  change in shoes   []  home exercises  []  physical therapy      []  CAM boot     []  brace:    [x]  injection:       []  surgery:      The patient also reports pain in his left anterior knee, for which he has tried a knee brace. INTERVAL HISTORY 11/25/2020:  He is seen again today in the office for follow up of a previous issue (as above). Since being seen last, he reports the problem has not significantly improved. At today's visit, he is using no brace or assistive device. The location and quality of the pain have not significantly changed since the last visit. He is here today for a preoperative visit, and wishes to proceed with surgery as planned. He denies any other significant changes in medical history. REVIEW OF SYSTEMS:  Constitutional: Negative for fever. HENT: Negative for tinnitus. Eyes: Negative for pain. Respiratory: Negative for shortness of breath. Cardiovascular: Negative for chest pain. Gastrointestinal: Negative for abdominal pain. Genitourinary: Negative for dysuria. Skin: Negative for rash. Neurological: Negative for headaches. Hematological: Does not bruise/bleed easily. Musculoskeletal: See HPI for pertinent positives     Past Medical History:    He  has a past medical history of Arthritis, Atrial fibrillation ([facility] CAD (coronary artery disease), CHF (congestive heart failure) ([facility] Diabetes ([facility] Diverticulosis, Hyperlipidemia, Obesity, АННА on CPAP, Poor historian, and Renal stone. Past Surgical History:    He  has a past surgical history that includes Appendectomy; other surgical history; Colonoscopy; Cardiac surgery; Cardiac catheterization (09/02/2020); and joint replacement. Current Medications:     Current Outpatient Medications:     apixaban (ELIQUIS) 5 MG TABS tablet, Take 5 mg by mouth 2 times daily, Disp: , Rfl:     melatonin 3 MG TABS tablet, Take 3 mg by mouth nightly as needed, Disp: , Rfl:     fenofibrate (TRICOR) 145 MG tablet, Take 145 mg by mouth daily , Disp: , Rfl:     glimepiride (AMARYL) 4 MG tablet, Take 4 mg by mouth every morning (before breakfast) , Disp: , Rfl:     metFORMIN (GLUCOPHAGE) 500 MG tablet, Take 1,000 mg by mouth 2 times daily (with meals) , Disp: , Rfl:     aspirin 81 MG tablet, Take 81 mg by mouth daily, Disp: , Rfl:     atorvastatin (LIPITOR) 80 MG tablet, Take 80 mg by mouth daily, Disp: , Rfl:     VITAMIN D, CHOLECALCIFEROL, PO, Take by mouth, Disp: , Rfl:     B Complex Vitamins (VITAMIN B COMPLEX PO), Take by mouth, Disp: , Rfl:     magnesium 30 MG tablet, Take 30 mg by mouth 2 times daily, Disp: , Rfl:     Omega-3 Fatty Acids (FISH OIL) 1000 MG CAPS, Take 3,000 mg by mouth 3 times daily, Disp: , Rfl:     Cranberry 1000 MG CAPS, Take by mouth, Disp: , Rfl:     ibuprofen (ADVIL;MOTRIN) 200 MG tablet, Take 800 mg by mouth every 6 hours as needed for Pain, Disp: , Rfl:     acetaminophen (TYLENOL) 500 MG tablet, Take 1,000 mg by mouth every 6 hours as needed for Pain, Disp: , Rfl:      Allergies:    Lisinopril    Family History:  family history is not on file.     Social History:   Social History     Occupational History    Not on file   Tobacco Use    Smoking status: Former Smoker     Last attempt to quit: 1975     Years since quittin.9    Smokeless tobacco: Never Used   Substance and Sexual Activity    Alcohol use: Yes     Alcohol/week: 0.0 standard drinks     Comment: rare    Drug use: No    Sexual activity: Yes     Partners: Female     Occupation: Retired      OBJECTIVE:  Pulse 63   Temp 97.1 °F (36.2 °C)   Resp 16   Ht 5' 9\" (1.753 m)   Wt 298 lb (135.2 kg)   BMI 44.01 kg/m²    Psych: alert and oriented to person, time, and place  Cardio:  well perfused extremities  Resp:  normal respiratory effort  Skin:  no cyanosis  Hem/lymph:  no lymphedema  Neuro:  sensation to light touch grossly intact throughout all nerve distributions in the foot   Musculoskeletal:    RLE:  Alignment:  Heel neutral   Vascular: Toes warm and well perfused, compartments soft/compressible. No significant swelling of foot. Skin: Intact without rash/lesions/AV malformations. Strength: Able to fire/perform the following with appropriate strength:    [x]  Tib Ant:     [x]  Gastroc-Soleus:         [x]  Inversion:    [x]  Eversion:         [x]  FHL:     [x]  EHL:      Motion:  Normal for the following joints:    [x]  Ankle:     [x]  Subtalar:        [x]  1st MTP:      []  1st TMT:            Tenderness to Palpation:    Tenderness to palpation: Fifth metatarsal head greater than dorsal midfoot diffusely  -Gastroc equinus      LLE:  Alignment:  Heel neutral   Vascular: Toes warm and well perfused, compartments soft/compressible. No significant swelling of foot. Skin: Intact without rash/lesions/AV malformations.   Strength: Able to fire/perform the following with appropriate strength:    [x]  Tib Ant:     [x]  Gastroc-Soleus:         [x]  Inversion:    [x]  Eversion:         [x]  FHL:     [x]  EHL:      Motion:  Normal for the following joints:    [x]  Ankle:     [x]  Subtalar:        [x]  1st MTP:      []  1st TMT: Tenderness to Palpation:    Tenderness to palpation:  dorsal midfoot diffusely  -Gastroc equinus      RADIOLOGY:   11/25/2020 No new radiology images today. Prior images reviewed for reference. FINDINGS:  Three weightbearing views (AP, Mortise, and Lateral) of the bilateral ankle and three weightbearing views (AP, Oblique, Lateral) of the bilateral foot and 4 weightbearing views (AP, tunnel, lateral, and sunrise view) of the left knee were obtained in the office today and reviewed, revealing no acute fracture, dislocation, or radioopaque foreign body/tumor. Degenerative changes of midfoot diffusely bilaterally with joint narrowing, sclerosis, and osteophytes. Bilateral tailor bunion deformity, worse on the right than on the left with metatarsus adductus and increased MITZI. Significant tricompartmental degenerative changes of the left knee with joint space narrowing, sclerosis, and osteophytes. IMPRESSION:  No acute fracture/dislocation. Degenerative changes as above. Electronically signed by Ailyn Toth MD      ASSESSMENT AND PLAN:  Aiden Rachel was seen today for Foot Pain (Right Foot)  The primary encounter diagnosis was Bunionette of right foot. Diagnoses of Arthritis of midfoot and Gastrocnemius equinus of right lower extremity were also pertinent to this visit. Body mass index is 44.01 kg/m². He has right foot pain secondary to a tailor's bunion (with a prominent metatarsal head as well as an increased MITZI) as well as some underlying midfoot arthritis, greater than left foot pain secondary to midfoot arthritis. He also has left knee pain secondary to arthritis. Notably, he has a somewhat complex past medical history including but not limited to the following:  Coronary artery disease status post quadruple bypass, non-insulin-dependent diabetes with polyneuropathy. He currently takes Eliquis twice daily.     I had another discussion today with the patient about the likely diagnosis and its natural history, physical exam and imaging findings, as well as treatment options in detail. Surgically, we have discussed right foot bunionette surgical correction (to likely include a condylectomy and bursectomy). He does wish to proceed with surgery as planned. Orders/referrals were placed as below at today's visit. We spoke about the risks/benefits/alternatives to a surgical intervention. They understand that the risks of surgery may include but are not limited to pain, infection, bleeding, blood clot, damage to soft tissue/vessel/nerve, future surgery, scarring/stiffness, decreased strength/weakness, cosmetic deformity, neuroma/neuritis/phantom pains, delayed soft tissue/bone healing, nonunion, malunion, failure of hardware/fixation/surgery, iatrogenic fracture/dislocation, damage to bone/joint(s), worsening of condition, recurrence, limb length discrepancy, avascular necrosis of bone, neurovascular compromise/compartment syndrome, tourniquet complications, failure of surgery, dissatisfaction with outcome, loss of limb, stroke, heart attack, pulmonary embolus, mental status change, anesthesia risks/reaction, and even death. They expressed verbal understanding of the risks and wish to proceed with surgical intervention. All questions were answered. No guarantees were made or implied. Informed consent was obtained. The patient was counseled about the risks of lana Covid-19 during the perioperative period and any recovery window from their procedure. The patient was made aware that lana Covid-19 may worsen their prognosis for recovering from their procedure and lend to a higher morbidity and/or mortality risk. All material risks, benefits, and reasonable alternatives including postponing the procedure were discussed. The patient does wish to proceed with their procedure at this time. We also had a discussion about the risk of blood clot and thromboembolic events. The patient understands that there is an increased risk with surgery/immobilization, and understands nothing will completely eliminate the risk of DVT/PE's, and that any prophylactic medication does not substitute for early mobilization. Given his risk profile, I have recommended the following strategy to decrease the risk of blood clots, and the patient agrees and wishes to proceed:  He will resume Eliquis and aspirin. All questions were answered and the patient agrees with the above plan. The patient will return to clinic postoperatively. No follow-ups on file. No orders of the defined types were placed in this encounter. No orders of the defined types were placed in this encounter. Brittney Almeida MD  Orthopedic Surgery, Foot and Ankle        Please excuse any typos/errors, as this note was created with the assistance of voice recognition software. While intending to generate a document that actually reflects the content of the visit, the document can still have some errors including those of syntax and sound-a-like substitutions which may escape proof reading. In such instances, actual meaning can be extrapolated by context.

## 2020-11-28 ENCOUNTER — HOSPITAL ENCOUNTER (OUTPATIENT)
Dept: PREADMISSION TESTING | Age: 80
Setting detail: SPECIMEN
Discharge: HOME | End: 2020-12-03
Payer: MEDICARE

## 2020-11-29 LAB
SARS-COV-2, RAPID: NORMAL
SARS-COV-2: NORMAL
SARS-COV-2: NOT DETECTED
SOURCE: NORMAL

## 2020-11-29 PROCEDURE — U0003 INFECTIOUS AGENT DETECTION BY NUCLEIC ACID (DNA OR RNA); SEVERE ACUTE RESPIRATORY SYNDROME CORONAVIRUS 2 (SARS-COV-2) (CORONAVIRUS DISEASE [COVID-19]), AMPLIFIED PROBE TECHNIQUE, MAKING USE OF HIGH THROUGHPUT TECHNOLOGIES AS DESCRIBED BY CMS-2020-01-R: HCPCS

## 2020-12-02 ENCOUNTER — ANESTHESIA EVENT (OUTPATIENT)
Dept: OPERATING ROOM | Age: 80
End: 2020-12-02
Payer: MEDICARE

## 2020-12-03 ENCOUNTER — HOSPITAL ENCOUNTER (OUTPATIENT)
Age: 80
Setting detail: OUTPATIENT SURGERY
Discharge: HOME | End: 2020-12-03
Attending: ORTHOPAEDIC SURGERY | Admitting: ORTHOPAEDIC SURGERY
Payer: MEDICARE

## 2020-12-03 ENCOUNTER — APPOINTMENT (OUTPATIENT)
Dept: GENERAL RADIOLOGY | Age: 80
End: 2020-12-03
Attending: ORTHOPAEDIC SURGERY
Payer: MEDICARE

## 2020-12-03 ENCOUNTER — ANESTHESIA (OUTPATIENT)
Dept: OPERATING ROOM | Age: 80
End: 2020-12-03
Payer: MEDICARE

## 2020-12-03 VITALS
TEMPERATURE: 97.5 F | HEART RATE: 109 BPM | HEIGHT: 69 IN | OXYGEN SATURATION: 93 % | SYSTOLIC BLOOD PRESSURE: 188 MMHG | WEIGHT: 298 LBS | DIASTOLIC BLOOD PRESSURE: 85 MMHG | RESPIRATION RATE: 18 BRPM | BODY MASS INDEX: 44.14 KG/M2

## 2020-12-03 VITALS — SYSTOLIC BLOOD PRESSURE: 168 MMHG | TEMPERATURE: 96.6 F | DIASTOLIC BLOOD PRESSURE: 87 MMHG | OXYGEN SATURATION: 100 %

## 2020-12-03 LAB
GLUCOSE BLD-MCNC: 152 MG/DL (ref 75–110)
GLUCOSE BLD-MCNC: 164 MG/DL (ref 75–110)

## 2020-12-03 PROCEDURE — 2500000003 HC RX 250 WO HCPCS: Performed by: NURSE ANESTHETIST, CERTIFIED REGISTERED

## 2020-12-03 PROCEDURE — 2580000003 HC RX 258: Performed by: NURSE ANESTHETIST, CERTIFIED REGISTERED

## 2020-12-03 PROCEDURE — 3700000000 HC ANESTHESIA ATTENDED CARE: Performed by: ORTHOPAEDIC SURGERY

## 2020-12-03 PROCEDURE — 2720000010 HC SURG SUPPLY STERILE: Performed by: ORTHOPAEDIC SURGERY

## 2020-12-03 PROCEDURE — 64445 NJX AA&/STRD SCIATIC NRV IMG: CPT | Performed by: ANESTHESIOLOGY

## 2020-12-03 PROCEDURE — 82947 ASSAY GLUCOSE BLOOD QUANT: CPT

## 2020-12-03 PROCEDURE — 7100000001 HC PACU RECOVERY - ADDTL 15 MIN: Performed by: ORTHOPAEDIC SURGERY

## 2020-12-03 PROCEDURE — 28110 PART REMOVAL OF METATARSAL: CPT | Performed by: ORTHOPAEDIC SURGERY

## 2020-12-03 PROCEDURE — 6370000000 HC RX 637 (ALT 250 FOR IP): Performed by: ORTHOPAEDIC SURGERY

## 2020-12-03 PROCEDURE — 6360000002 HC RX W HCPCS: Performed by: NURSE ANESTHETIST, CERTIFIED REGISTERED

## 2020-12-03 PROCEDURE — 2580000003 HC RX 258: Performed by: ORTHOPAEDIC SURGERY

## 2020-12-03 PROCEDURE — 2709999900 HC NON-CHARGEABLE SUPPLY: Performed by: ORTHOPAEDIC SURGERY

## 2020-12-03 PROCEDURE — 2500000003 HC RX 250 WO HCPCS: Performed by: ANESTHESIOLOGY

## 2020-12-03 PROCEDURE — 7100000010 HC PHASE II RECOVERY - FIRST 15 MIN: Performed by: ORTHOPAEDIC SURGERY

## 2020-12-03 PROCEDURE — L4360 PNEUMAT WALKING BOOT PRE CST: HCPCS | Performed by: ORTHOPAEDIC SURGERY

## 2020-12-03 PROCEDURE — 3600000012 HC SURGERY LEVEL 2 ADDTL 15MIN: Performed by: ORTHOPAEDIC SURGERY

## 2020-12-03 PROCEDURE — 3600000002 HC SURGERY LEVEL 2 BASE: Performed by: ORTHOPAEDIC SURGERY

## 2020-12-03 PROCEDURE — 7100000000 HC PACU RECOVERY - FIRST 15 MIN: Performed by: ORTHOPAEDIC SURGERY

## 2020-12-03 PROCEDURE — 7100000011 HC PHASE II RECOVERY - ADDTL 15 MIN: Performed by: ORTHOPAEDIC SURGERY

## 2020-12-03 PROCEDURE — 3700000001 HC ADD 15 MINUTES (ANESTHESIA): Performed by: ORTHOPAEDIC SURGERY

## 2020-12-03 PROCEDURE — 6360000002 HC RX W HCPCS: Performed by: ORTHOPAEDIC SURGERY

## 2020-12-03 PROCEDURE — 3209999900 FLUORO FOR SURGICAL PROCEDURES

## 2020-12-03 RX ORDER — PROPOFOL 10 MG/ML
INJECTION, EMULSION INTRAVENOUS PRN
Status: DISCONTINUED | OUTPATIENT
Start: 2020-12-03 | End: 2020-12-03 | Stop reason: SDUPTHER

## 2020-12-03 RX ORDER — ACETAMINOPHEN 500 MG
1000 TABLET ORAL ONCE
Status: DISCONTINUED | OUTPATIENT
Start: 2020-12-03 | End: 2020-12-03 | Stop reason: HOSPADM

## 2020-12-03 RX ORDER — LIDOCAINE HYDROCHLORIDE 20 MG/ML
INJECTION, SOLUTION EPIDURAL; INFILTRATION; INTRACAUDAL; PERINEURAL PRN
Status: DISCONTINUED | OUTPATIENT
Start: 2020-12-03 | End: 2020-12-03 | Stop reason: SDUPTHER

## 2020-12-03 RX ORDER — GINSENG 100 MG
CAPSULE ORAL PRN
Status: DISCONTINUED | OUTPATIENT
Start: 2020-12-03 | End: 2020-12-03 | Stop reason: ALTCHOICE

## 2020-12-03 RX ORDER — ONDANSETRON 2 MG/ML
4 INJECTION INTRAMUSCULAR; INTRAVENOUS
Status: DISCONTINUED | OUTPATIENT
Start: 2020-12-03 | End: 2020-12-03 | Stop reason: HOSPADM

## 2020-12-03 RX ORDER — SODIUM CHLORIDE 0.9 % (FLUSH) 0.9 %
10 SYRINGE (ML) INJECTION EVERY 12 HOURS SCHEDULED
Status: DISCONTINUED | OUTPATIENT
Start: 2020-12-03 | End: 2020-12-03 | Stop reason: HOSPADM

## 2020-12-03 RX ORDER — NAPROXEN 250 MG/1
250 TABLET ORAL 2 TIMES DAILY PRN
Status: ON HOLD | COMMUNITY
End: 2020-12-03 | Stop reason: HOSPADM

## 2020-12-03 RX ORDER — GLYCOPYRROLATE 1 MG/5 ML
SYRINGE (ML) INTRAVENOUS PRN
Status: DISCONTINUED | OUTPATIENT
Start: 2020-12-03 | End: 2020-12-03 | Stop reason: SDUPTHER

## 2020-12-03 RX ORDER — ONDANSETRON 2 MG/ML
INJECTION INTRAMUSCULAR; INTRAVENOUS PRN
Status: DISCONTINUED | OUTPATIENT
Start: 2020-12-03 | End: 2020-12-03 | Stop reason: SDUPTHER

## 2020-12-03 RX ORDER — MIDAZOLAM HYDROCHLORIDE 1 MG/ML
INJECTION INTRAMUSCULAR; INTRAVENOUS PRN
Status: DISCONTINUED | OUTPATIENT
Start: 2020-12-03 | End: 2020-12-03 | Stop reason: SDUPTHER

## 2020-12-03 RX ORDER — FENTANYL CITRATE 50 UG/ML
25 INJECTION, SOLUTION INTRAMUSCULAR; INTRAVENOUS EVERY 5 MIN PRN
Status: DISCONTINUED | OUTPATIENT
Start: 2020-12-03 | End: 2020-12-03 | Stop reason: HOSPADM

## 2020-12-03 RX ORDER — BUPIVACAINE HYDROCHLORIDE 5 MG/ML
INJECTION, SOLUTION EPIDURAL; INTRACAUDAL
Status: DISCONTINUED | OUTPATIENT
Start: 2020-12-03 | End: 2020-12-03 | Stop reason: SDUPTHER

## 2020-12-03 RX ORDER — SODIUM CHLORIDE, SODIUM LACTATE, POTASSIUM CHLORIDE, CALCIUM CHLORIDE 600; 310; 30; 20 MG/100ML; MG/100ML; MG/100ML; MG/100ML
INJECTION, SOLUTION INTRAVENOUS CONTINUOUS
Status: DISCONTINUED | OUTPATIENT
Start: 2020-12-04 | End: 2020-12-03 | Stop reason: HOSPADM

## 2020-12-03 RX ORDER — FENTANYL CITRATE 50 UG/ML
INJECTION, SOLUTION INTRAMUSCULAR; INTRAVENOUS PRN
Status: DISCONTINUED | OUTPATIENT
Start: 2020-12-03 | End: 2020-12-03 | Stop reason: SDUPTHER

## 2020-12-03 RX ORDER — HYDROMORPHONE HCL 110MG/55ML
0.25 PATIENT CONTROLLED ANALGESIA SYRINGE INTRAVENOUS EVERY 5 MIN PRN
Status: DISCONTINUED | OUTPATIENT
Start: 2020-12-03 | End: 2020-12-03 | Stop reason: HOSPADM

## 2020-12-03 RX ORDER — OXYCODONE HYDROCHLORIDE AND ACETAMINOPHEN 5; 325 MG/1; MG/1
1 TABLET ORAL EVERY 6 HOURS PRN
Qty: 20 TABLET | Refills: 0 | Status: SHIPPED | OUTPATIENT
Start: 2020-12-03 | End: 2020-12-10

## 2020-12-03 RX ORDER — SODIUM CHLORIDE 0.9 % (FLUSH) 0.9 %
10 SYRINGE (ML) INJECTION PRN
Status: DISCONTINUED | OUTPATIENT
Start: 2020-12-03 | End: 2020-12-03 | Stop reason: HOSPADM

## 2020-12-03 RX ORDER — SODIUM CHLORIDE 9 MG/ML
INJECTION, SOLUTION INTRAVENOUS CONTINUOUS
Status: DISCONTINUED | OUTPATIENT
Start: 2020-12-04 | End: 2020-12-03

## 2020-12-03 RX ORDER — SODIUM CHLORIDE, SODIUM LACTATE, POTASSIUM CHLORIDE, CALCIUM CHLORIDE 600; 310; 30; 20 MG/100ML; MG/100ML; MG/100ML; MG/100ML
INJECTION, SOLUTION INTRAVENOUS CONTINUOUS PRN
Status: DISCONTINUED | OUTPATIENT
Start: 2020-12-03 | End: 2020-12-03 | Stop reason: SDUPTHER

## 2020-12-03 RX ORDER — LIDOCAINE HYDROCHLORIDE 10 MG/ML
1 INJECTION, SOLUTION EPIDURAL; INFILTRATION; INTRACAUDAL; PERINEURAL
Status: DISCONTINUED | OUTPATIENT
Start: 2020-12-03 | End: 2020-12-03 | Stop reason: HOSPADM

## 2020-12-03 RX ADMIN — Medication 100 MCG: at 10:35

## 2020-12-03 RX ADMIN — ONDANSETRON 4 MG: 2 INJECTION, SOLUTION INTRAMUSCULAR; INTRAVENOUS at 11:44

## 2020-12-03 RX ADMIN — Medication 0.4 MG: at 10:45

## 2020-12-03 RX ADMIN — SODIUM CHLORIDE, POTASSIUM CHLORIDE, SODIUM LACTATE AND CALCIUM CHLORIDE: 600; 310; 30; 20 INJECTION, SOLUTION INTRAVENOUS at 10:30

## 2020-12-03 RX ADMIN — CEFAZOLIN SODIUM 3 G: 10 INJECTION, POWDER, FOR SOLUTION INTRAVENOUS at 10:45

## 2020-12-03 RX ADMIN — BUPIVACAINE HYDROCHLORIDE 20 ML: 5 INJECTION, SOLUTION EPIDURAL; INTRACAUDAL; PERINEURAL at 12:50

## 2020-12-03 RX ADMIN — PROPOFOL 200 MG: 10 INJECTION, EMULSION INTRAVENOUS at 10:35

## 2020-12-03 RX ADMIN — MIDAZOLAM 2 MG: 1 INJECTION INTRAMUSCULAR; INTRAVENOUS at 10:30

## 2020-12-03 RX ADMIN — LIDOCAINE HYDROCHLORIDE 60 MG: 20 INJECTION, SOLUTION EPIDURAL; INFILTRATION; INTRACAUDAL; PERINEURAL at 10:35

## 2020-12-03 RX ADMIN — PROPOFOL 100 MG: 10 INJECTION, EMULSION INTRAVENOUS at 10:38

## 2020-12-03 ASSESSMENT — PULMONARY FUNCTION TESTS
PIF_VALUE: 29
PIF_VALUE: 28
PIF_VALUE: 24
PIF_VALUE: 22
PIF_VALUE: 29
PIF_VALUE: 29
PIF_VALUE: 27
PIF_VALUE: 26
PIF_VALUE: 28
PIF_VALUE: 24
PIF_VALUE: 12
PIF_VALUE: 20
PIF_VALUE: 27
PIF_VALUE: 0
PIF_VALUE: 28
PIF_VALUE: 18
PIF_VALUE: 24
PIF_VALUE: 29
PIF_VALUE: 28
PIF_VALUE: 17
PIF_VALUE: 29
PIF_VALUE: 28
PIF_VALUE: 26
PIF_VALUE: 29
PIF_VALUE: 26
PIF_VALUE: 3
PIF_VALUE: 16
PIF_VALUE: 28
PIF_VALUE: 17
PIF_VALUE: 3
PIF_VALUE: 22
PIF_VALUE: 30
PIF_VALUE: 29
PIF_VALUE: 29
PIF_VALUE: 26
PIF_VALUE: 26
PIF_VALUE: 29
PIF_VALUE: 29
PIF_VALUE: 24
PIF_VALUE: 29
PIF_VALUE: 26
PIF_VALUE: 28
PIF_VALUE: 29
PIF_VALUE: 27
PIF_VALUE: 24
PIF_VALUE: 26
PIF_VALUE: 2
PIF_VALUE: 29
PIF_VALUE: 1
PIF_VALUE: 29
PIF_VALUE: 29
PIF_VALUE: 0
PIF_VALUE: 26
PIF_VALUE: 17
PIF_VALUE: 11
PIF_VALUE: 7
PIF_VALUE: 29
PIF_VALUE: 29
PIF_VALUE: 5
PIF_VALUE: 28
PIF_VALUE: 1
PIF_VALUE: 15
PIF_VALUE: 0
PIF_VALUE: 0
PIF_VALUE: 29
PIF_VALUE: 0
PIF_VALUE: 1
PIF_VALUE: 29
PIF_VALUE: 25
PIF_VALUE: 26

## 2020-12-03 ASSESSMENT — PAIN SCALES - GENERAL
PAINLEVEL_OUTOF10: 6
PAINLEVEL_OUTOF10: 3
PAINLEVEL_OUTOF10: 0

## 2020-12-03 ASSESSMENT — PAIN - FUNCTIONAL ASSESSMENT: PAIN_FUNCTIONAL_ASSESSMENT: 0-10

## 2020-12-03 ASSESSMENT — PAIN DESCRIPTION - ORIENTATION: ORIENTATION: RIGHT

## 2020-12-03 ASSESSMENT — PAIN DESCRIPTION - LOCATION: LOCATION: FOOT

## 2020-12-03 ASSESSMENT — PAIN DESCRIPTION - PAIN TYPE: TYPE: SURGICAL PAIN

## 2020-12-03 ASSESSMENT — PAIN DESCRIPTION - PROGRESSION: CLINICAL_PROGRESSION: GRADUALLY IMPROVING

## 2020-12-03 NOTE — ANESTHESIA PROCEDURE NOTES
Peripheral Block    Patient location during procedure: PACU  Start time: 12/3/2020 12:37 PM  End time: 12/3/2020 12:44 PM  Staffing  Anesthesiologist: Tamy Dunn MD  Performed: anesthesiologist   Preanesthetic Checklist  Completed: patient identified, site marked, surgical consent, pre-op evaluation, timeout performed, IV checked, risks and benefits discussed, monitors and equipment checked, anesthesia consent given, oxygen available and patient being monitored  Peripheral Block  Patient position: supine  Prep: ChloraPrep  Patient monitoring: cardiac monitor, continuous pulse ox, continuous capnometry, frequent blood pressure checks and IV access  Block type: Sciatic  Laterality: right  Injection technique: single-shot  Procedures: ultrasound guided  Infiltration strength: 1 %  Dose: 2 mL  Popliteal  Provider prep: sterile gloves and mask  Needle  Needle type: pencil-tip   Needle gauge: 20 G  Needle localization: ultrasound guidance  Assessment  Injection assessment: negative aspiration for heme, no paresthesia on injection and local visualized surrounding nerve on ultrasound  Paresthesia pain: none  Slow fractionated injection: yes  Hemodynamics: stable  Medications Administered  Bupivacaine (MARCAINE) PF injection 0.5%, 20 mL  Reason for block: procedure for pain, post-op pain management and at surgeon's request

## 2020-12-03 NOTE — ANESTHESIA POSTPROCEDURE EVALUATION
Department of Anesthesiology  Postprocedure Note    Patient: Megha Wills  MRN: [id number]  YOB: 1940  Date of evaluation: 12/3/2020  Time:  1:49 PM     Procedure Summary     Date:  12/03/20 Room / Location:  [address] [place] [address]    Anesthesia Start:  1030 Anesthesia Stop:  5362    Procedure:  RIGHT 5TH METHEAD CONDYLECTOMY (Right Foot) Diagnosis:  (DX RIGHT BUNION)    Surgeon:  Danyelle Lawton MD Responsible Provider:  Phi Perla MD    Anesthesia Type:  general, regional ASA Status:  4          Anesthesia Type: general, regional    Eliel Phase I: Eliel Score: 6    Eliel Phase II:      Last vitals: Reviewed and per EMR flowsheets.        Anesthesia Post Evaluation    Patient location during evaluation: PACU  Patient participation: complete - patient participated  Level of consciousness: awake  Airway patency: patent  Nausea & Vomiting: no nausea  Complications: no  Cardiovascular status: blood pressure returned to baseline  Respiratory status: acceptable  Hydration status: euvolemic

## 2020-12-03 NOTE — PROGRESS NOTES
Pt unaware of medication dosages and times last taken, he states his wife deals with his medications. Messages x4 left for wife to return call to update med list. Dr Allna wants to know when last Eliquis dose taken before surgery. Daughter contacted to attempt to contact pt's wife as well.

## 2020-12-03 NOTE — H&P
I spoke to the patient in the preoperative area, and the operative site was identified, verified and marked. The procedure was verified. We have reviewed the risks, benefits, and alternatives to the procedure. No guarantees were made. I have also reviewed the history and physical. There are no significant changes in medical history. All questions were answered and they wish to proceed as planned.      Electronically signed by Steve Das MD

## 2020-12-03 NOTE — BRIEF OP NOTE
Brief Postoperative Note      Patient: Yaquelin Carey  YOB: 1940  MRN: [id number]    Date of Procedure: 12/3/2020    Pre-Op Diagnosis: Right bunionette deformity    Post-Op Diagnosis: Rigth 5th metatarsal bunionette deformity       Procedure(s):  1. Right 5th metatarsal head condylectomy    Surgeon(s):  Gino Tapia MD    Assistant:  Surgical Assistant: Hermelinda Fay  Resident: Scotty Vera DO    Anesthesia: General    Estimated Blood Loss (mL): 10mL    Fluids: 700mL crystalloids    TT: 14DGA    Complications: None    Specimens:   * No specimens in log *    Implants:  * No implants in log *      Drains: * No LDAs found *    Findings: Right 5th MT head bunionette deformity. See op note for details.     Electronically signed by Scotty Vera DO on 12/3/2020 at 11:50 AM

## 2020-12-03 NOTE — ANESTHESIA PRE PROCEDURE
Department of Anesthesiology  Preprocedure Note       Name:  Darcie Payton   Age:  79 y.o.  :  1940                                          MRN:  [id number]         Date:  12/3/2020      Surgeon: Erasmo Ruiz):  Kathy Jordan MD    Procedure: Procedure(s):  RIGHT 5TH METHEAD CONDYLECTOMY    Medications prior to admission:   Prior to Admission medications    Medication Sig Start Date End Date Taking?  Authorizing Provider   apixaban (ELIQUIS) 5 MG TABS tablet Take 5 mg by mouth 2 times daily    Historical Provider, MD   melatonin 3 MG TABS tablet Take 3 mg by mouth nightly as needed    Historical Provider, MD   fenofibrate (TRICOR) 145 MG tablet Take 145 mg by mouth daily  16   Historical Provider, MD   glimepiride (AMARYL) 4 MG tablet Take 4 mg by mouth every morning (before breakfast)  16   Historical Provider, MD   metFORMIN (GLUCOPHAGE) 500 MG tablet Take 1,000 mg by mouth 2 times daily (with meals)     Historical Provider, MD   aspirin 81 MG tablet Take 81 mg by mouth daily    Historical Provider, MD   atorvastatin (LIPITOR) 80 MG tablet Take 80 mg by mouth daily    Historical Provider, MD   VITAMIN D, CHOLECALCIFEROL, PO Take by mouth    Historical Provider, MD   B Complex Vitamins (VITAMIN B COMPLEX PO) Take by mouth    Historical Provider, MD   magnesium 30 MG tablet Take 30 mg by mouth 2 times daily    Historical Provider, MD   Omega-3 Fatty Acids (FISH OIL) 1000 MG CAPS Take 3,000 mg by mouth 3 times daily    Historical Provider, MD   Cranberry 1000 MG CAPS Take by mouth    Historical Provider, MD   ibuprofen (ADVIL;MOTRIN) 200 MG tablet Take 800 mg by mouth every 6 hours as needed for Pain    Historical Provider, MD   acetaminophen (TYLENOL) 500 MG tablet Take 1,000 mg by mouth every 6 hours as needed for Pain    Historical Provider, MD       Current medications:    Current Facility-Administered Medications   Medication Dose Route Frequency Provider Last Rate Last Dose    [START ON Cardiovascular:  Exercise tolerance: poor (<4 METS),   (+) CAD:, CABG/stent (09 cabg, cath 9/20):, dysrhythmias: atrial fibrillation and atrial flutter, CHF:, hyperlipidemia      ECG reviewed                     ROS comment: Moderate risk cardiac cleared     Neuro/Psych:   (+) neuromuscular disease (restless leg syndrome):,             GI/Hepatic/Renal:        (-) GERD       Endo/Other:    (+) Diabetes, . Abdominal:           Vascular:                                      Anesthesia Plan      general and regional     ASA 4     (Lma)  Induction: intravenous. MIPS: Postoperative opioids intended and Prophylactic antiemetics administered. Anesthetic plan and risks discussed with patient. Plan discussed with CRNA.     Attending anesthesiologist reviewed and agrees with Missy Altamirano MD   12/3/2020

## 2020-12-03 NOTE — PROGRESS NOTES
Right side popliteal single shot nerve block performed in PACU by Dr. Artie Joya with Sergio Hale, RN and Marilee Lynn RN present and assisting. Time out performed and documented in flowsheets. Procedure start at 66 [phone] [id number] 14 and complete at [phone] 02. VSS and patient tolerated procedure well.

## 2020-12-03 NOTE — H&P
Omega-3 Fatty Acids (FISH OIL) 1000 MG CAPS Take 3,000 mg by mouth 3 times daily    Historical Provider, MD   Cranberry 1000 MG CAPS Take by mouth    Historical Provider, MD   ibuprofen (ADVIL;MOTRIN) 200 MG tablet Take 800 mg by mouth every 6 hours as needed for Pain    Historical Provider, MD   acetaminophen (TYLENOL) 500 MG tablet Take 1,000 mg by mouth every 6 hours as needed for Pain    Historical Provider, MD         This is a 79 y.o. male who is  alert and oriented x3, in no acute distress. Heart: Heart sounds are normal.  HR regular rate and rhythm without murmur, gallop or rub. Lungs: Normal respiratory effort with equal expansion, good air exchange, unlabored and clear to auscultation without wheezes or rales bilaterally   Abdomen: soft, nontender, nondistended with bowel sounds AUDIBLE X 4 ,  OBESE .  BMI 44  PEDAL PULSES + 1 BILATERALLY , HAS BILATERAL LEG EDEMA , NO CALF TENDERNESS    Labs:  Recent Labs     11/16/20  1554   HGB 14.7   HCT 44.8   WBC 8.5   MCV 87.7         K 4.4      CO2 24   BUN 21   CREATININE 1.11   GLUCOSE 118*       Recent Labs     11/28/20  1440   COVID19 Not Detected                   ORIN ROCHA  APRN, ACNP-BC  Electronically signed 12/3/2020 at 8:29 AM          Physician    Specialty:  Orthopedic Surgery    Progress Notes    Signed    Encounter Date:  11/25/2020          Related encounter: Office Visit from 11/25/2020 in [address] [place] [address] and Sports Medicine          Signed        Expand All Collapse All     Show:Clear all  [x]Manual[x]Template[x]Copied    Added by:  [x]Hugh Mason MD    []Jannie for details    [address]  Dept: [phone]     Ambulatory Orthopedic Consult        CHIEF COMPLAINT:         Chief Complaint   Patient presents with    Foot Pain       Right Foot         HISTORY OF PRESENT ILLNESS: The patient is a 79 y.o. male who is being seen for consultation and evaluation of pain at the dorsal midfoot (on the right greater than left foot), which began many years ago atraumatically. The pain is described mainly with mechanical terms (dull/sharp/throbbing). The pain is worse with activity and better with rest. The patient reports a progressive course. The patient has tried:      [x]? rest/activity modification          [x]? NSAIDs                     []?  opiates          [x]? orthotics        [x]? change in shoes   []?  home exercises           []? physical therapy        []? CAM boot     []? brace:    [x]?  injection:       []?  surgery:       The patient also reports pain in his left anterior knee, for which he has tried a knee brace. INTERVAL HISTORY 11/25/2020:  He is seen again today in the office for follow up of a previous issue (as above). Since being seen last, he reports the problem has not significantly improved. At today's visit, he is using no brace or assistive device. The location and quality of the pain have not significantly changed since the last visit. He is here today for a preoperative visit, and wishes to proceed with surgery as planned. He denies any other significant changes in medical history. REVIEW OF SYSTEMS:  Constitutional: Negative for fever. HENT: Negative for tinnitus. Eyes: Negative for pain. Respiratory: Negative for shortness of breath. Cardiovascular: Negative for chest pain. Gastrointestinal: Negative for abdominal pain. Genitourinary: Negative for dysuria. Skin: Negative for rash. Neurological: Negative for headaches. Hematological: Does not bruise/bleed easily.    Musculoskeletal: See HPI for pertinent positives     Past Medical History:    He   Past Medical History in prose (no negatives)    has a past medical history of Arthritis, Atrial fibrillation ([facility] [address] CAD (coronary artery disease), CHF (congestive heart failure) ([facility] [address] Diabetes ([facility] [address] Diverticulosis, Hyperlipidemia, Obesity, АННА on CPAP, Poor historian, and Renal stone. Past Surgical History:    He  has a past surgical history that includes Appendectomy; other surgical history; Colonoscopy; Cardiac surgery; Cardiac catheterization (2020); and joint replacement. Current Medications:   Current Medication     Current Outpatient Medications:     apixaban (ELIQUIS) 5 MG TABS tablet, Take 5 mg by mouth 2 times daily, Disp: , Rfl:     melatonin 3 MG TABS tablet, Take 3 mg by mouth nightly as needed, Disp: , Rfl:     fenofibrate (TRICOR) 145 MG tablet, Take 145 mg by mouth daily , Disp: , Rfl:     glimepiride (AMARYL) 4 MG tablet, Take 4 mg by mouth every morning (before breakfast) , Disp: , Rfl:     metFORMIN (GLUCOPHAGE) 500 MG tablet, Take 1,000 mg by mouth 2 times daily (with meals) , Disp: , Rfl:     aspirin 81 MG tablet, Take 81 mg by mouth daily, Disp: , Rfl:     atorvastatin (LIPITOR) 80 MG tablet, Take 80 mg by mouth daily, Disp: , Rfl:     VITAMIN D, CHOLECALCIFEROL, PO, Take by mouth, Disp: , Rfl:     B Complex Vitamins (VITAMIN B COMPLEX PO), Take by mouth, Disp: , Rfl:     magnesium 30 MG tablet, Take 30 mg by mouth 2 times daily, Disp: , Rfl:     Omega-3 Fatty Acids (FISH OIL) 1000 MG CAPS, Take 3,000 mg by mouth 3 times daily, Disp: , Rfl:     Cranberry 1000 MG CAPS, Take by mouth, Disp: , Rfl:     ibuprofen (ADVIL;MOTRIN) 200 MG tablet, Take 800 mg by mouth every 6 hours as needed for Pain, Disp: , Rfl:     acetaminophen (TYLENOL) 500 MG tablet, Take 1,000 mg by mouth every 6 hours as needed for Pain, Disp: , Rfl:          Allergies:    Lisinopril     Family History:  family history is not on file.      Social History:   Social History            Occupational History    Not on file   Tobacco Use    Smoking status: Former Smoker       Last attempt to quit: 1975       Years since quittin.9    Smokeless tobacco: Never Used Substance and Sexual Activity    Alcohol use: Yes       Alcohol/week: 0.0 standard drinks       Comment: rare    Drug use: No    Sexual activity: Yes       Partners: Female      Occupation: Retired      OBJECTIVE:  Pulse 63   Temp 97.1 °F (36.2 °C)   Resp 16   Ht 5' 9\" (1.753 m)   Wt 298 lb (135.2 kg)   BMI 44.01 kg/m²    Psych: alert and oriented to person, time, and place  Cardio:  well perfused extremities  Resp:  normal respiratory effort  Skin:  no cyanosis  Hem/lymph:  no lymphedema  Neuro:  sensation to light touch grossly intact throughout all nerve distributions in the foot   Musculoskeletal:    RLE:  Alignment:  Heel neutral   Vascular: Toes warm and well perfused, compartments soft/compressible. No significant swelling of foot. Skin: Intact without rash/lesions/AV malformations. Strength: Able to fire/perform the following with appropriate strength:    [x]? Tib Ant:                  [x]? Gastroc-Soleus:                    [x]? Inversion:              [x]? Eversion:                   [x]?  FHL:      [x]? EHL:       Motion:  Normal for the following joints:    [x]? Ankle:                    [x]? Subtalar:                  [x]? 1st MTP:                []?  1st TMT:                        Tenderness to Palpation:    Tenderness to palpation: Fifth metatarsal head greater than dorsal midfoot diffusely  -Gastroc equinus        LLE:  Alignment:  Heel neutral   Vascular: Toes warm and well perfused, compartments soft/compressible. No significant swelling of foot. Skin: Intact without rash/lesions/AV malformations. Strength: Able to fire/perform the following with appropriate strength:    [x]? Tib Ant:                  [x]? Gastroc-Soleus:                    [x]? Inversion:              [x]? Eversion:                   [x]?  FHL:      [x]? EHL:       Motion:  Normal for the following joints:    [x]? Ankle:                    [x]? Subtalar:                  [x]?   1st MTP: discussion today with the patient about the likely diagnosis and its natural history, physical exam and imaging findings, as well as treatment options in detail. Surgically, we have discussed right foot bunionette surgical correction (to likely include a condylectomy and bursectomy). He does wish to proceed with surgery as planned. Orders/referrals were placed as below at today's visit. We spoke about the risks/benefits/alternatives to a surgical intervention. They understand that the risks of surgery may include but are not limited to pain, infection, bleeding, blood clot, damage to soft tissue/vessel/nerve, future surgery, scarring/stiffness, decreased strength/weakness, cosmetic deformity, neuroma/neuritis/phantom pains, delayed soft tissue/bone healing, nonunion, malunion, failure of hardware/fixation/surgery, iatrogenic fracture/dislocation, damage to bone/joint(s), worsening of condition, recurrence, limb length discrepancy, avascular necrosis of bone, neurovascular compromise/compartment syndrome, tourniquet complications, failure of surgery, dissatisfaction with outcome, loss of limb, stroke, heart attack, pulmonary embolus, mental status change, anesthesia risks/reaction, and even death. They expressed verbal understanding of the risks and wish to proceed with surgical intervention. All questions were answered. No guarantees were made or implied. Informed consent was obtained. The patient was counseled about the risks of lana Covid-19 during the perioperative period and any recovery window from their procedure. The patient was made aware that lana Covid-19 may worsen their prognosis for recovering from their procedure and lend to a higher morbidity and/or mortality risk. All material risks, benefits, and reasonable alternatives including postponing the procedure were discussed. The patient does wish to proceed with their procedure at this time.      We also had a discussion about the risk of blood clot and thromboembolic events. The patient understands that there is an increased risk with surgery/immobilization, and understands nothing will completely eliminate the risk of DVT/PE's, and that any prophylactic medication does not substitute for early mobilization. Given his risk profile, I have recommended the following strategy to decrease the risk of blood clots, and the patient agrees and wishes to proceed:  He will resume Eliquis and aspirin. All questions were answered and the patient agrees with the above plan. The patient will return to clinic postoperatively. No follow-ups on file. Encounter Medications    No orders of the defined types were placed in this encounter. No orders of the defined types were placed in this encounter. Doron Shipley MD  Orthopedic Surgery, Foot and Ankle           Please excuse any typos/errors, as this note was created with the assistance of voice recognition software. While intending to generate a document that actually reflects the content of the visit, the document can still have some errors including those of syntax and sound-a-like substitutions which may escape proof reading. In such instances, actual meaning can be extrapolated by context.

## 2020-12-04 ENCOUNTER — CLINICAL DOCUMENTATION (OUTPATIENT)
Dept: ORTHOPEDIC SURGERY | Age: 80
End: 2020-12-04

## 2020-12-06 NOTE — OP NOTE
[place] [address]  Dept: [phone]: [phone]      Orthopedic Surgery Operative Report      Patient: Adrianna Basurto      MRN#: [id number]     YOB: 1940      Date of Admission: 12/3/2020    Attending Surgeon: Doron Shipley M.D.   PCP: Benton Walsh MD        Preoperative Diagnosis:   1. Right foot bunionette deformity  2. Non-insulin-dependent diabetes with polyneuropathy  3. Coronary artery disease status post quadruple bypass  4. Eliquis twice daily use  5. Body mass index is 44.01 kg/m². Postoperative Diagnosis:   1. Same     Procedures Performed:  (12/3/2020)  1. Right foot fifth metatarsal head ostectomy      Implants:      * No implants in log *      Attending Surgeon:     Osei Harper MD      Assistant Surgeon:    Surgical Assistant: Yaya Sam  Resident: Jacobo Steel DO      Anesthesia:    General      Staff:  Surgeon(s):  Ailyn Toth MD  Surgical Assistant: Yaya Sam  Scrub Person First: Doug Joseph  Anesthesia: Dr. Jose Ding      Estimated Blood Loss:    Minimal      Complications:    None      Specimen:    * No specimens in log *      History:    Mr. Adrianna Basurto is a 79 y.o. male who was seen recently for the above problem. He has right foot pain secondary to a tailor's bunion (with a prominent metatarsal head as well as an increased MITZI) as well as some underlying midfoot arthritis, greater than left foot pain secondary to midfoot arthritis. He also has left knee pain secondary to arthritis.     Notably, he has a somewhat complex past medical history including but not limited to the following:  Coronary artery disease status post quadruple bypass, non-insulin-dependent diabetes with polyneuropathy.   He currently takes Eliquis twice daily. He is currently medically stable and appropriate for the planned procedure.   We discussed multiple surgical options for his problem, including osteotomy and realignment, given his increased MITZI, however given his medical history and inability to tolerate the relevant postoperative restrictions/precautions, he wished to proceed with a simple condylectomy, as he is having significant difficulty with pain and with shoe wear. We have spoken about the risks/benefits/alternatives to a surgical intervention, verbal understanding of these risks was expressed, and informed consent was obtained. All questions were answered. No guarantees were made or implied. I have answered all questions, and they wish to proceed with surgical intervention. Operative Note:    The patient was identified in the preoperative holding area by name, MRN, and . The surgical site was verified and marked according to AAOS guidelines. The patient was taken to the operating room and placed on the operating table in a supine position. After achieving adequate sedation by the anesthesia team, the patient was then carefully positioned and all bony prominences were then padded. A tourniquet was placed on the ipsilateral thigh, and then the operative extremity was prepped and draped in the usual sterile fashion. A timeout was held in which the patient's identity, surgical site, procedure, allergies, and antibiotics were verified, and all in the room were in agreement, and thus the procedure began. The leg was exsanguinated to the level of the tourniquet, and the tourniquet was then elevated to 300 mm Hg. The total tourniquet time was 39 minutes. Attention was turned to the lateral forefoot, where an incision was marked out over the dorsal lateral aspect of the fifth metatarsophalangeal joint. The skin was incised sharply and vessels were cauterized. Careful dissection was then performed to identify and expose the capsule. An arthrotomy was then performed in line with the incision, and the fifth MTP joint was exposed subperiosteally.   The lateral prominence of the metatarsal head was excised using a saw. Fluoroscopy was used to help verify that the appropriate resection had been performed, along with direct visualization and palpation. The bony surface was smoothly contoured. The capsule was freed bluntly and redundant lateral capsule was sharply excised, and the joint was irrigated to remove any debris. The lateral capsule was then tightened using 0 Vicryl. The fifth toe was noted to be in appropriate position clinically, and fluoroscopy confirmed appropriate positioning of the toe and the joint. Final radiographs were obtained and reviewed, and redemonstrated appropriate position/alignment of the foot. The foot and toes were all noted to be well perfused. Copious sterile irrigation was used to rinse the operative sites, and a layered closure was performed using 2-0 vicryl and 3-0 nylon, providing appropriate tension to the skin. The skin was cleaned and gently dried. Steri-Strips were then applied, and anti-bacterial ointment was applied on top of that, with Adaptic and fluffs. A sterile layer of cast padding was then applied, and a short leg splint was then applied with the ankle at neutral.    The patient was aroused from anesthesia without complication, and gently transferred to the bed and then transported to the postoperative area in a stable condition. The patient was noted to have tolerated the procedure well without complication.       Postoperative Plan:         Precautions: Weightbearing as tolerated on the Right lower extremity   -             []  Physical Therapy/Home exercises       Immobilization:      []  Splint/Cast  [x]  CAM boot  []  Comfortable shoe    []  Other:      DVT ppx:   [x]  Early mobilization       [x]  Medication as prescribed (Resume Eliquis twice daily beginning postop day 1)      []  No chemical ppx needed; mechanical only   -    Pain control:  Medication as prescribed (dosing and quantity) indicated for acute postoperative pain control   -    Special concerns: []  Vitamin D -- due to relatively low level, the patient will take supplementation         []  Prealbumin -- due to relatively low level, the patient will take supplementation    [x]  Glucose control            []  Tobacco cessation     []  Pin pulling in office at 6 weeks anticipated    []  Follow up on pathology results    []  Follow up on culture results    []          [x]  Avoid strenuous activity/pain provoking maneuvers and high-impact repetitive exercises    -    Disposition:   PACU      The patient has been instructed to follow up in the office. The particulars of surgery as well as the condition of the patient postoperatively were discussed with the patients family thereafter per the patient's wishes.            Fernanda López MD  Orthopedic Surgery

## 2020-12-07 ENCOUNTER — OFFICE VISIT (OUTPATIENT)
Dept: ORTHOPEDIC SURGERY | Age: 80
End: 2020-12-07

## 2020-12-07 VITALS — HEART RATE: 47 BPM | RESPIRATION RATE: 15 BRPM | WEIGHT: 298 LBS | HEIGHT: 69 IN | BODY MASS INDEX: 44.14 KG/M2

## 2020-12-07 PROCEDURE — 99024 POSTOP FOLLOW-UP VISIT: CPT | Performed by: ORTHOPAEDIC SURGERY

## 2020-12-07 NOTE — PROGRESS NOTES
[facility] [address] [facility]  [address] [place] [address]  Dept: [phone]    Ambulatory Orthopedic Postoperative Visit     Preoperative Diagnosis:   1. Right foot bunionette deformity  2. Non-insulin-dependent diabetes with polyneuropathy  3. Coronary artery disease status post quadruple bypass  4. Eliquis twice daily use  5. Body mass index is 44.01 kg/m².     Postoperative Diagnosis:   1. Same      Procedures Performed:  (12/3/2020)  1. Right foot fifth metatarsal head ostectomy      SUBJECTIVE:     The patient returns post op from the above stated procedure. Reports doing well overall, reports improved pain, denies wound drainage/issues, fevers/chills/night sweats, calf swelling/pain, chest pain, shortness of breath. He is here today for pain at his incision site, and is concerned that he may have an infection. OBJECTIVE:  Pulse (!) 47   Resp 15   Ht 5' 9\" (1.753 m)   Wt 298 lb (135.2 kg)   BMI 44.01 kg/m²    NAD, resting comfortably  Incisions clean/dry/intact, no erythema/dehiscence/drainage  Sensation to light touch grossly intact throughout   Warm and well perfused  Grossly neurovascularly intact distally  No signs of infection      RADIOLOGY:   12/7/2020 No new radiology images today. Prior images reviewed for reference. ASSESSMENT AND PLAN:     .5 weeks s/p above, doing well overall        []  Sutures were removed and steri-strips applied          Precautions: Weightbearing as tolerated on the Right lower extremity             -              []?  Physical Therapy/Home exercises        Immobilization:      []? Splint/Cast               [x]? CAM boot                    []?  Comfortable shoe    []? Other:                DVT ppx:   [x]? Early mobilization              [x]?   Medication as prescribed (Resume Eliquis twice daily beginning postop day 1)                      []?  No chemical ppx needed; mechanical only -     Pain control:  Medication as prescribed (dosing and quantity) indicated for acute postoperative pain control             -     Special concerns:       []? Vitamin D -- due to relatively low level, the patient will take supplementation                           []?  Prealbumin -- due to relatively low level, the patient will take supplementation    [x]? Glucose control            []? Tobacco cessation     []? Pin pulling in office at 6 weeks anticipated    []? Follow up on pathology results    []? Follow up on culture results    []?            [x]? Avoid strenuous activity/pain provoking maneuvers and high-impact repetitive exercises       All questions were answered and the patient agrees with the above plan. The patient will return to clinic as previously scheduled in about 1.5 weeks         No follow-ups on file. No orders of the defined types were placed in this encounter. No orders of the defined types were placed in this encounter. Jany Candelario MD  Orthopedic Surgery        Please excuse any typos/errors, as this note was created with the assistance of voice recognition software. While intending to generate a document that actually reflects the content of the visit, the document can still have some errors including those of syntax and sound-a-like substitutions which may escape proof reading. In such instances, actual meaning can be extrapolated by context.

## 2020-12-18 ENCOUNTER — OFFICE VISIT (OUTPATIENT)
Dept: ORTHOPEDIC SURGERY | Age: 80
End: 2020-12-18

## 2020-12-18 VITALS
HEIGHT: 69 IN | RESPIRATION RATE: 16 BRPM | TEMPERATURE: 96.4 F | HEART RATE: 80 BPM | BODY MASS INDEX: 44.14 KG/M2 | WEIGHT: 298 LBS

## 2020-12-18 PROCEDURE — 99024 POSTOP FOLLOW-UP VISIT: CPT | Performed by: ORTHOPAEDIC SURGERY

## 2020-12-18 NOTE — PROGRESS NOTES
[facility]  [address] [address]  Dept: [phone]    Ambulatory Orthopedic Postoperative Visit     Preoperative Diagnosis:   1. Right foot bunionette deformity  2. Non-insulin-dependent diabetes with polyneuropathy  3. Coronary artery disease status post quadruple bypass  4. Eliquis twice daily use  5. Body mass index is 44.01 kg/m².     Postoperative Diagnosis:   1. Same      Procedures Performed:  (12/3/2020)  1. Right foot fifth metatarsal head ostectomy      SUBJECTIVE:     The patient returns post op from the above stated procedure. Reports doing well overall, reports improved pain, denies wound drainage/issues, fevers/chills/night sweats, calf swelling/pain, chest pain, shortness of breath. He is ambulating today in his shoe that he has modified at home. OBJECTIVE:  Pulse 80   Temp 96.4 °F (35.8 °C)   Resp 16   Ht 5' 9\" (1.753 m)   Wt 298 lb (135.2 kg)   BMI 44.01 kg/m²    NAD, resting comfortably  Incisions clean/dry/intact, no erythema/dehiscence/drainage  Sensation to light touch grossly intact throughout   Warm and well perfused  Grossly neurovascularly intact distally  No signs of infection      RADIOLOGY:   12/18/2020 No new radiology images today. Prior images reviewed for reference. ASSESSMENT AND PLAN:     2 weeks s/p above, doing well overall        [x]  Sutures were removed and steri-strips applied          Precautions: Weightbearing as tolerated on the Right lower extremity             -              []?  Physical Therapy/Home exercises        Immobilization:      []? Splint/Cast               []?  CAM boot                    [x]? Comfortable shoe    []? Other:                DVT ppx:   [x]? Early mobilization              [x]?   Medication as prescribed (Resume Eliquis twice daily beginning postop day 1)                      []?  No chemical ppx needed; mechanical only             -

## 2021-01-14 DIAGNOSIS — M21.621 BUNIONETTE OF RIGHT FOOT: Primary | ICD-10-CM

## 2021-01-15 ENCOUNTER — OFFICE VISIT (OUTPATIENT)
Dept: ORTHOPEDIC SURGERY | Age: 81
End: 2021-01-15

## 2021-01-15 VITALS — TEMPERATURE: 96.9 F | BODY MASS INDEX: 44.14 KG/M2 | RESPIRATION RATE: 16 BRPM | HEIGHT: 69 IN | WEIGHT: 298 LBS

## 2021-01-15 DIAGNOSIS — S92.351A CLOSED DISPLACED FRACTURE OF FIFTH METATARSAL BONE OF RIGHT FOOT, INITIAL ENCOUNTER: ICD-10-CM

## 2021-01-15 DIAGNOSIS — M21.621 BUNIONETTE OF RIGHT FOOT: Primary | ICD-10-CM

## 2021-01-15 PROCEDURE — 99024 POSTOP FOLLOW-UP VISIT: CPT | Performed by: ORTHOPAEDIC SURGERY

## 2021-01-15 NOTE — PROGRESS NOTES
[facility]  [address] [address]  Dept: [phone]    Ambulatory Orthopedic Postoperative Visit     Preoperative Diagnosis:   1. Right foot bunionette deformity  2. Non-insulin-dependent diabetes with polyneuropathy  3. Coronary artery disease status post quadruple bypass  4. Eliquis twice daily use  5. Body mass index is 44.01 kg/m².     Postoperative Diagnosis:   1. Same      Procedures Performed:  (12/3/2020)  1. Right foot fifth metatarsal head ostectomy      SUBJECTIVE:     The patient returns post op from the above stated procedure. Reports doing well overall, reports improved pain, denies wound drainage/issues, fevers/chills/night sweats, calf swelling/pain, chest pain, shortness of breath. He is ambulating today in regular shoes. He reports that his pain is improved since surgery, but occasionally gets a dull ache. He denies any new injury. OBJECTIVE:  Temp 96.9 °F (36.1 °C)   Resp 16   Ht 5' 9\" (1.753 m)   Wt 298 lb (135.2 kg)   BMI 44.01 kg/m²    NAD, resting comfortably  Incisions clean/dry/intact, no erythema/dehiscence/drainage  Sensation to light touch grossly intact throughout   Warm and well perfused  Grossly neurovascularly intact distally  No signs of infection  Tenderness at the fifth metatarsal neck      RADIOLOGY:   1/15/2021 FINDINGS:  Three weightbearing views (AP, Oblique, and Lateral) of the right foot were obtained in the office today and reviewed, revealing displaced fifth metatarsal neck fracture with angulation and some shortening.     IMPRESSION: Fifth metatarsal fracture as above    Electronically signed by Keshav Vasquez MD      ASSESSMENT AND PLAN: 6 weeks s/p above, doing well overall, but with a displaced fifth metatarsal neck fracture, that occurred at some point postoperatively. Despite the fracture, he and his wife report that he is doing better in terms of pain than he was before surgery.    -We talked about possible surgery to fix the fracture, and we discussed the risks of delayed union and nonunion, as well as the anticipated postoperative restrictions. At this point, they wish to treat this nonsurgically, and I agree. As he does seem to be doing better overall with pain compared to preoperatively, as well as the need to be nonweightbearing postoperatively with ORIF versus pinning, I do believe it is in his best interest to treat this conservatively, as I do believe this will heal with time and activity modification. Precautions: Weightbearing as tolerated on the Right lower extremity             -                Special concerns:       [x]? Glucose control                  [x]? Avoid strenuous activity/pain provoking maneuvers and high-impact repetitive exercises    -The patient was referred to prosthetics orthotics to obtain a custom rocker-bottom shoe with a rigid sole, to accommodate his forefoot width      All questions were answered and the patient agrees with the above plan. The patient will return to clinic in 6 weeks with repeat x-rays             Return in about 6 weeks (around 2/26/2021). No orders of the defined types were placed in this encounter. Orders Placed This Encounter   Procedures    DME Order for Orthosis as OP     Eval and treat.      Please provide:  Stiff soled shoes with rocker bottom to accommodate width of forefoot     Violeta Vasquez MD  Orthopedic Surgery, Foot and Ankle         Violeta Vasquez MD  Orthopedic Surgery Please excuse any typos/errors, as this note was created with the assistance of voice recognition software. While intending to generate a document that actually reflects the content of the visit, the document can still have some errors including those of syntax and sound-a-like substitutions which may escape proof reading. In such instances, actual meaning can be extrapolated by context.

## 2021-02-25 DIAGNOSIS — M21.621 BUNIONETTE OF RIGHT FOOT: Primary | ICD-10-CM

## 2021-03-02 ENCOUNTER — OFFICE VISIT (OUTPATIENT)
Dept: ORTHOPEDIC SURGERY | Age: 81
End: 2021-03-02

## 2021-03-02 VITALS — WEIGHT: 298 LBS | HEIGHT: 69 IN | TEMPERATURE: 98.2 F | RESPIRATION RATE: 12 BRPM | BODY MASS INDEX: 44.14 KG/M2

## 2021-03-02 DIAGNOSIS — M21.621 BUNIONETTE OF RIGHT FOOT: Primary | ICD-10-CM

## 2021-03-02 DIAGNOSIS — S92.351D CLOSED DISPLACED FRACTURE OF FIFTH METATARSAL BONE OF RIGHT FOOT WITH ROUTINE HEALING, SUBSEQUENT ENCOUNTER: ICD-10-CM

## 2021-03-02 PROCEDURE — 99024 POSTOP FOLLOW-UP VISIT: CPT | Performed by: ORTHOPAEDIC SURGERY

## 2021-03-02 NOTE — PROGRESS NOTES
[facility]  [address] [address]  Dept: [phone]    Ambulatory Orthopedic Postoperative Visit     Preoperative Diagnosis:   1. Right foot bunionette deformity  2. Non-insulin-dependent diabetes with polyneuropathy  3. Coronary artery disease status post quadruple bypass  4. Eliquis twice daily use  5. Body mass index is 44.01 kg/m².     Postoperative Diagnosis:   1. Same      Procedures Performed:  (12/3/2020)  1. Right foot fifth metatarsal head ostectomy      SUBJECTIVE:     The patient returns post op from the above stated procedure. Reports doing well overall, reports improved pain, denies wound drainage/issues, fevers/chills/night sweats, calf swelling/pain, chest pain, shortness of breath. He is ambulating today in a custom shoes, and denies any pain with ambulation or otherwise. He reports that his contralateral left foot is now his limiting factor, but reports that his right foot is doing very well. OBJECTIVE:  Temp 98.2 °F (36.8 °C)   Resp 12   Ht 5' 9\" (1.753 m)   Wt 298 lb (135.2 kg)   BMI 44.01 kg/m²    NAD, resting comfortably  Incisions clean/dry/intact, no erythema/dehiscence/drainage  Sensation to light touch grossly intact throughout   Warm and well perfused  Grossly neurovascularly intact distally  No signs of infection  -No tenderness at fifth metatarsal      RADIOLOGY:   3/2/2021 FINDINGS:  Three weightbearing views (AP, Oblique, and Lateral) of the right foot were obtained in the office today and reviewed, revealing displaced fifth metatarsal neck fracture with angulation and some shortening. No interval displacement, with some interval healing noted.     IMPRESSION: Fifth metatarsal fracture as above    Electronically signed by Adore Brannon MD      ASSESSMENT AND PLAN:     12 weeks s/p above, doing well overall, but with a displaced fifth metatarsal neck fracture, that occurred at some point postoperatively. Despite the fracture, he and his wife report that he is doing better in terms of pain than he was before surgery, and he is asymptomatic at his fracture site. Precautions: Weightbearing as tolerated on the Right lower extremity             -Continue to use his custom shoes (width 9E)               Special concerns:       [x]? Glucose control                  [x]? Avoid strenuous activity/pain provoking maneuvers and high-impact repetitive exercises    -The patient was referred to prosthetics orthotics to obtain a custom rocker-bottom shoe with a rigid sole, to accommodate his forefoot width    -I provided a prescription for Voltaren (4g TOPL q QID PRN pain). -The patient was also referred to obtain custom zippered compression socks. All questions were answered and the patient agrees with the above plan. The patient will return to clinic in 8 weeks with repeat x-rays             Return in about 8 weeks (around 4/27/2021). Orders Placed This Encounter   Medications    diclofenac sodium (VOLTAREN) 1 % GEL     Sig: Apply 2 g topically 4 times daily as needed for Pain     Dispense:  1 g     Refill:  0     Orders Placed This Encounter   Procedures    DME Order for (Specify) as OP     Eval and treat. Please provide:  Custom zippered compression socks (to apply 20-30 mmHg compression)    Diagnosis: edema/swelling    Micheline Larson MD  Orthopedic Surgery, Foot and Ankle         Micheline Larson MD  Orthopedic Surgery        Please excuse any typos/errors, as this note was created with the assistance of voice recognition software. While intending to generate a document that actually reflects the content of the visit, the document can still have some errors including those of syntax and sound-a-like substitutions which may escape proof reading. In such instances, actual meaning can be extrapolated by context.

## 2021-04-27 ENCOUNTER — OFFICE VISIT (OUTPATIENT)
Dept: ORTHOPEDIC SURGERY | Age: 81
End: 2021-04-27
Payer: MEDICARE

## 2021-04-27 VITALS — HEIGHT: 69 IN | TEMPERATURE: 98 F | BODY MASS INDEX: 44.14 KG/M2 | WEIGHT: 298 LBS | RESPIRATION RATE: 12 BRPM

## 2021-04-27 DIAGNOSIS — S92.351D CLOSED DISPLACED FRACTURE OF FIFTH METATARSAL BONE OF RIGHT FOOT WITH ROUTINE HEALING, SUBSEQUENT ENCOUNTER: ICD-10-CM

## 2021-04-27 DIAGNOSIS — M21.621 BUNIONETTE OF RIGHT FOOT: Primary | ICD-10-CM

## 2021-04-27 PROCEDURE — 99213 OFFICE O/P EST LOW 20 MIN: CPT | Performed by: ORTHOPAEDIC SURGERY

## 2021-04-27 PROCEDURE — G8417 CALC BMI ABV UP PARAM F/U: HCPCS | Performed by: ORTHOPAEDIC SURGERY

## 2021-04-27 PROCEDURE — G8427 DOCREV CUR MEDS BY ELIG CLIN: HCPCS | Performed by: ORTHOPAEDIC SURGERY

## 2021-04-27 PROCEDURE — 1123F ACP DISCUSS/DSCN MKR DOCD: CPT | Performed by: ORTHOPAEDIC SURGERY

## 2021-04-27 PROCEDURE — 4040F PNEUMOC VAC/ADMIN/RCVD: CPT | Performed by: ORTHOPAEDIC SURGERY

## 2021-04-27 PROCEDURE — 1036F TOBACCO NON-USER: CPT | Performed by: ORTHOPAEDIC SURGERY

## 2021-04-27 NOTE — PROGRESS NOTES
[facility]  [facility]  [address] [place] [address]  Dept: [phone]    Ambulatory Orthopedic Follow Up Visit    Preoperative Diagnosis:   1. Right foot bunionette deformity  2. Non-insulin-dependent diabetes with polyneuropathy  3. Coronary artery disease status post quadruple bypass  4. Eliquis twice daily use  5. Body mass index is 44.01 kg/m².     Postoperative Diagnosis:   1. Same      Procedures Performed:  (12/3/2020)  1. Right foot fifth metatarsal head ostectomy      CHIEF COMPLAINT:    Chief Complaint   Patient presents with    Foot Pain     right         HISTORY OF PRESENT ILLNESS:       He is a 79 y.o. male, seen again today in the office for follow up of the above problem with a history of pain at the above location. Since being seen last, the patient is doing about the same overall. At today's visit, he is using no brace or assistive device. He reports that he is doing well overall, and denies any pain in his right foot, with ambulation or otherwise. He does report some occasional contralateral left dorsal midfoot pain. REVIEW OF SYSTEMS:   Constitutional: Negative for fever. HENT: Negative for tinnitus. Eyes: Negative for pain. Respiratory: Negative for shortness of breath. Cardiovascular: Negative for chest pain. Gastrointestinal: Negative for abdominal pain. Genitourinary: Negative for dysuria. Skin: Negative for rash. Neurological: Negative for headaches. Hematological: Does not bruise/bleed easily. Musculoskeletal: See HPI for pertinent positives     Past Medical History:    He  has a past medical history of Arthritis, Atrial fibrillation ([address] CAD (coronary artery disease), CHF (congestive heart failure) ([address] Diabetes ([address] Diverticulosis, Hyperlipidemia, Obesity, АННА on CPAP, Poor historian, and Renal stone.      Past Surgical History:    He  has a past surgical history that includes Appendectomy; other surgical history; Colonoscopy; Cardiac surgery; Cardiac catheterization (2020); joint replacement; and Bunionectomy (Right, 12/3/2020). Current Medications:     Current Outpatient Medications:     diclofenac sodium (VOLTAREN) 1 % GEL, Apply 2 g topically 2 times daily, Disp: 100 g, Rfl: 0    diclofenac sodium (VOLTAREN) 1 % GEL, Apply 2 g topically 4 times daily as needed for Pain, Disp: 1 g, Rfl: 0    apixaban (ELIQUIS) 5 MG TABS tablet, Take 5 mg by mouth 2 times daily, Disp: , Rfl:     melatonin 3 MG TABS tablet, Take 3 mg by mouth nightly as needed, Disp: , Rfl:     fenofibrate (TRICOR) 145 MG tablet, Take 145 mg by mouth daily , Disp: , Rfl:     glimepiride (AMARYL) 4 MG tablet, Take 4 mg by mouth every morning (before breakfast) , Disp: , Rfl:     metFORMIN (GLUCOPHAGE) 500 MG tablet, Take 1,000 mg by mouth 2 times daily (with meals) , Disp: , Rfl:     aspirin 81 MG tablet, Take 81 mg by mouth daily, Disp: , Rfl:     atorvastatin (LIPITOR) 80 MG tablet, Take 80 mg by mouth daily, Disp: , Rfl:     VITAMIN D, CHOLECALCIFEROL, PO, Take by mouth, Disp: , Rfl:     B Complex Vitamins (VITAMIN B COMPLEX PO), Take by mouth, Disp: , Rfl:     magnesium 30 MG tablet, Take 30 mg by mouth 2 times daily, Disp: , Rfl:     Cranberry 1000 MG CAPS, Take by mouth, Disp: , Rfl:      Allergies:    Lisinopril    Family History:  family history is not on file. Social History:   Social History     Occupational History    Not on file   Tobacco Use    Smoking status: Former Smoker     Quit date: 1975     Years since quittin.2    Smokeless tobacco: Never Used   Substance and Sexual Activity    Alcohol use:  Yes     Alcohol/week: 0.0 standard drinks     Comment: rare    Drug use: No    Sexual activity: Yes     Partners: Female       OBJECTIVE:  Temp 98 °F (36.7 °C)   Resp 12   Ht 5' 9\" (1.753 m)   Wt 298 lb (135.2 kg)   BMI 44.01 kg/m²    Psych: alert and oriented to person, time, and place  Cardio:  well perfused extremities  Resp:  normal respiratory effort  Skin:  no cyanosis  Hem/lymph:  no lymphedema  Neuro:  sensation to light touch unchanged since last visit  Musculoskeletal:    Incisions clean/dry/intact, no erythema/dehiscence/drainage  Sensation to light touch grossly intact throughout   Warm and well perfused  Grossly neurovascularly intact distally  No signs of infection  -No tenderness at fifth metatarsal      RADIOLOGY:   4/27/2021 FINDINGS:  Three weightbearing views (AP, Oblique, and Lateral) of the right foot were obtained in the office today and reviewed, revealing displaced fifth metatarsal neck fracture with angulation and some shortening. No interval displacement, with some interval healing noted.     IMPRESSION: Fifth metatarsal fracture as above.     Electronically signed by Rosaura Avila MD      ASSESSMENT AND PLAN:  Body mass index is 44.01 kg/m². He is status post the above (on 12/3/2020), doing well overall, but he does have a displaced fifth metatarsal neck insufficiency fracture that occurred at some point postoperatively. Despite this fracture, he reports that he is doing much better in terms of pain compared to before surgery, and denies any pain in his right foot at all.     He has a history of right foot pain secondary to a tailor's bunion (with a prominent metatarsal head as well as an increased MITZI) as well as some underlying midfoot arthritis, greater than left foot pain secondary to midfoot arthritis.  He also has left knee pain secondary to arthritis.     Notably, he has a complex past medical history including but not limited to the following:  Coronary artery disease status post quadruple bypass, non-insulin-dependent diabetes with polyneuropathy.   He has a history of using Eliquis twice daily.     We had a discussion today about the likely diagnosis and its natural history, physical exam and imaging findings, as well as various treatment options in detail. Surgically, we discussed that I do not recommend any surgical intervention for his fracture, as he is asymptomatic. I recommended continued conservative management, and he agrees. Orders/referrals were placed as below at today's visit. He may continue to use his custom shoes with a rocker-bottoms and rigid soles but accommodate his forefoot with (with 9E). He may continue to use topical Voltaren gel as needed for pain. A referral was also provided to our [address] [place] [address] podiatrists, as he requests routine toenail care. All questions were answered and the above plan was agreed upon. The patient will return to clinic PRN . At the patient's next visit, depending on how the patient is doing and/or new imaging/labs results, we may consider the following options:    []  Orthotic (OTC)     []  Orthotic (custom)          []  Rocker bottom shoes     []  Brace (OTC)        []  Brace (custom)             []  CAM boot        []  Night splint         []  Heel cups        []  Strap      []  Toe sleeves/splints    []  PT:                     []  Wean out of immobilization   []  Advance activity       []  Topical               []  NSAIDs          []  Shreyas         []  Referral:         []  Stress xrays       []  CT         []  MRI        []  Injection:         []  Consider OR      []  Pick OR date    Return if symptoms worsen or fail to improve. No orders of the defined types were placed in this encounter. Orders Placed This Encounter   Procedures   Len Rinne, DPM, Podiatry, [place]     Referral Priority:   Routine     Referral Type:   Eval and Treat     Referral Reason:   Specialty Services Required     Referred to Provider:   Jaron Sampson DPM     Requested Specialty:   Podiatry     Number of Visits Requested:   1         Devyn Irwin MD  Orthopedic Surgery        Please excuse any typos/errors, as this note was created with the assistance of voice recognition software. While intending to generate a document that actually reflects the content of the visit, the document can still have some errors including those of syntax and sound-a-like substitutions which may escape proof reading. In such instances, actual meaning can be extrapolated by context.

## 2022-02-14 ENCOUNTER — OFFICE VISIT (OUTPATIENT)
Dept: PODIATRY | Age: 82
End: 2022-02-14
Payer: MEDICARE

## 2022-02-14 VITALS — HEIGHT: 69 IN | WEIGHT: 298 LBS | BODY MASS INDEX: 44.14 KG/M2 | RESPIRATION RATE: 18 BRPM

## 2022-02-14 DIAGNOSIS — E11.51 TYPE II DIABETES MELLITUS WITH PERIPHERAL CIRCULATORY DISORDER (HCC): Primary | ICD-10-CM

## 2022-02-14 DIAGNOSIS — B35.1 DERMATOPHYTOSIS OF NAIL: ICD-10-CM

## 2022-02-14 DIAGNOSIS — M79.671 BILATERAL FOOT PAIN: ICD-10-CM

## 2022-02-14 DIAGNOSIS — I73.9 PVD (PERIPHERAL VASCULAR DISEASE) (HCC): ICD-10-CM

## 2022-02-14 DIAGNOSIS — M79.672 BILATERAL FOOT PAIN: ICD-10-CM

## 2022-02-14 DIAGNOSIS — R60.0 EDEMA OF LOWER EXTREMITY: ICD-10-CM

## 2022-02-14 PROCEDURE — 1123F ACP DISCUSS/DSCN MKR DOCD: CPT | Performed by: PODIATRIST

## 2022-02-14 PROCEDURE — G8417 CALC BMI ABV UP PARAM F/U: HCPCS | Performed by: PODIATRIST

## 2022-02-14 PROCEDURE — G8427 DOCREV CUR MEDS BY ELIG CLIN: HCPCS | Performed by: PODIATRIST

## 2022-02-14 PROCEDURE — 1036F TOBACCO NON-USER: CPT | Performed by: PODIATRIST

## 2022-02-14 PROCEDURE — 99213 OFFICE O/P EST LOW 20 MIN: CPT | Performed by: PODIATRIST

## 2022-02-14 PROCEDURE — 11721 DEBRIDE NAIL 6 OR MORE: CPT | Performed by: PODIATRIST

## 2022-02-14 PROCEDURE — G8484 FLU IMMUNIZE NO ADMIN: HCPCS | Performed by: PODIATRIST

## 2022-02-14 PROCEDURE — 4040F PNEUMOC VAC/ADMIN/RCVD: CPT | Performed by: PODIATRIST

## 2022-02-14 NOTE — PROGRESS NOTES
[address] [facility]  [address] [facility] [address] [facility] [address]  Dept: [phone]  Dept Fax: [phone]    DIABETIC PROGRESS NOTE  Date of patient's visit: 2/14/2022  Patient's Name:  León Mac YOB: 1940            Patient Care Team:  Bharath Burns MD as PCP - [address] Jones DPM as Physician (Podiatry)  Martha Rosales DPM as Physician (Podiatry)          Chief Complaint   Patient presents with    Diabetes    Peripheral Neuropathy    Nail Problem    Foot Pain       Subjective:   León Mac comes to clinic for Diabetes, Peripheral Neuropathy, Nail Problem, and Foot Pain    he is a diabetic and states that he is doing well. Pt currently has complaint of thickened, elongated nails that they cannot manage by themselves. Pt's primary care physician is Bharath Burns MD last seen 12/21/2021   Pt's last blood sugar was unknown. Pt has a new complaint of increased swelling to hali LE. Lab Results   Component Value Date    LABA1C 7.4 (H) 11/16/2020      Complains of numbness in the feet bilat.   Past Medical History:   Diagnosis Date    Arthritis     Atrial fibrillation ([address]     CAD (coronary artery disease)     no stents    CHF (congestive heart failure) ([address]     patient denies    Diabetes ([address]     type 2    Diverticulosis     Hyperlipidemia     Obesity     АННА on CPAP     nightly    Poor historian     Renal stone     passed       Allergies   Allergen Reactions    Lisinopril      Patient unsure of reaction     Current Outpatient Medications on File Prior to Visit   Medication Sig Dispense Refill    diclofenac sodium (VOLTAREN) 1 % GEL Apply 2 g topically 2 times daily 100 g 0    diclofenac sodium (VOLTAREN) 1 % GEL Apply 2 g topically 4 times daily as needed for Pain 1 g 0    apixaban (ELIQUIS) 5 MG TABS tablet Take 5 mg by mouth 2 times daily      melatonin 3 MG TABS tablet Take 3 mg by mouth nightly as needed      fenofibrate (TRICOR) 145 MG tablet Take 145 mg by mouth daily       glimepiride (AMARYL) 4 MG tablet Take 4 mg by mouth every morning (before breakfast)       metFORMIN (GLUCOPHAGE) 500 MG tablet Take 1,000 mg by mouth 2 times daily (with meals)       aspirin 81 MG tablet Take 81 mg by mouth daily      atorvastatin (LIPITOR) 80 MG tablet Take 80 mg by mouth daily      VITAMIN D, CHOLECALCIFEROL, PO Take by mouth      B Complex Vitamins (VITAMIN B COMPLEX PO) Take by mouth      magnesium 30 MG tablet Take 30 mg by mouth 2 times daily      Cranberry 1000 MG CAPS Take by mouth       No current facility-administered medications on file prior to visit. Review of Systems    Review of Systems:   History obtained from chart review and the patient  General ROS: negative for - chills, fatigue, fever, night sweats or weight gain  Constitutional: Negative for chills, diaphoresis, fatigue, fever and unexpected weight change. Musculoskeletal: Positive for arthralgias, gait problem and joint swelling. Neurological ROS: negative for - behavioral changes, confusion, headaches or seizures. Negative for weakness and numbness. Dermatological ROS: negative for - mole changes, rash  Cardiovascular: Negative for leg swelling. Gastrointestinal: Negative for constipation, diarrhea, nausea and vomiting. Objective:  Dermatologic Exam:  Skin lesion/ulceration Absent . Skin No rashes or nodules noted. .   Skin is thin, with flaky sloughing skin as well as decreased hair growth to the lower leg  Small red hemosiderin deposits seen dorsal foot   Musculoskeletal:     1st MPJ ROM decreased, Bilateral.  Muscle strength 5/5, Bilateral.  Pain present upon palpation of toenails 1-5, Bilateral. decreased medial longitudinal arch, Bilateral.  Ankle ROM decreased,Bilateral.    Dorsally contracted digits present digits 2, Bilateral.     Vascular: DP pulses 1/4 bilateral.  PT pulses 0/4 bilateral. CFT <5 seconds, Bilateral.  Hair growth absent to the level of the digits, Bilateral.  Edema present, Bilateral.  Varicosities absent, Bilateral. Erythema absent, Bilateral    Neurological: Sensation diminshed to light touch to level of digits, Bilateral.  Protective sensation intact 6/10 sites via 5.07/10g [place] Monofilament, Bilateral.  negative Tinel's, Bilateral.  negative Valleix sign, Bilateral.      Integument: Warm, dry, supple, Bilateral.  Open lesion absent, Bilateral.  Interdigital maceration absent to web spaces 4, Bilateral.  Nails 1-5 left and 1-5 right thickened > 3.0 mm, dystrophic and crumbly, discolored with subungual debris. Fissures absent, Bilateral.   General: AAO x 3 in NAD.     Derm  Toenail Description  Sites of Onychomycosis Involvement (Check affected area)  [x] [x] [x] [x] [x] [x] [x] [x] [x] [x]  5 4 3 2 1 1 2 3 4 5                          Right                                        Left    Thickness  [x] [x] [x] [x] [x] [x] [x] [x] [x] [x]  5 4 3 2 1 1 2 3 4 5                         Right                                        Left    Dystrophic Changes   [x] [x] [x] [x] [x] [x] [x] [x] [x] [x]  5 4 3 2 1 1 2 3 4 5                         Right                                        Left    Color   [x] [x] [x] [x] [x] [x] [x] [x] [x] [x]  5 4 3 2 1 1 2 3 4 5                          Right                                        Left    Incurvation/Ingrowin   [] [] [] [] [] [] [] [] [] []  5 4 3 2 1 1 2 3 4 5                         Right                                        Left    Inflammation/Pain   [x] [x] [x] [x] [x] [x] [x] [x] [x] [x]  5 4 3 2 1 1 2 3 4 5                         Right                                        Left        Visual inspection:  Deformity: hammertoe deformity hali feet  amputation: absent  Skin lesions: absent  Edema: right- 2+ pitting edema, left- 2+ pitting edema    Sensory exam:  Monofilament sensation: abnormal - 6/10 via SW 5.07/10g monofilament to the plantar foot bilateral feet    Pulses: abnormal - 1/4 dorsalis pedis pulse and 0/4 Posterior tibial pulse,   Pinprick: Impaired  Proprioception: Impaired  Vibration (128 Hz): Impaired       DM with PVD       [x]Yes    []No      Assessment:  [de-identified] y.o. male with:   Diagnosis Orders   1. Type II diabetes mellitus with peripheral circulatory disorder (HCC)  [facility] DIABETES FOOT EXAM    35737 - [place] DEBRIDEMENT OF NAILS, 6 OR MORE   2. PVD (peripheral vascular disease) (HCC)  [facility] DIABETES FOOT EXAM    98024 - [place] DEBRIDEMENT OF NAILS, 6 OR MORE   3. Bilateral foot pain  [facility] DIABETES FOOT EXAM    74815 - [place] DEBRIDEMENT OF NAILS, 6 OR MORE   4. Dermatophytosis of nail  [facility] DIABETES FOOT EXAM    92579 - [place] DEBRIDEMENT OF NAILS, 6 OR MORE   5. Edema of lower extremity  [facility] DIABETES FOOT EXAM         Q7   []Yes    []No                Q8   [x]Yes    []No                     Q9   []Yes    []No    Plan:   Pt was evaluated and examined. Patient was given personalized discharge instructions. To address new complaint of increased swelling, recommend patient to wear compression stockings. Iinstructed pt to wear at all times when walking. Pt may take NSAIDs as needed. Nails 1-10 were debrided sharply in length and thickness with a nipper and , without incident. Pt will follow up in 9 weeks or sooner if any problems arise. Diagnosis was discussed with the pt and all of their questions were answered in detail. Proper foot hygiene and care was discussed with the pt. Informed patient on proper diabetic foot care and importance of tight glycemic control. Patient to check feet daily and contact the office with any questions/problems/concerns.    Other comorbidity noted and will be managed by PCP.  2/14/2022    Electronically signed by Kelle Aiken DPM on 2/14/2022 at 3:01 PM  2/14/2022

## 2022-04-18 ENCOUNTER — OFFICE VISIT (OUTPATIENT)
Dept: PODIATRY | Age: 82
End: 2022-04-18
Payer: MEDICARE

## 2022-04-18 VITALS — RESPIRATION RATE: 18 BRPM | WEIGHT: 289 LBS | BODY MASS INDEX: 42.8 KG/M2 | HEIGHT: 69 IN

## 2022-04-18 DIAGNOSIS — B35.1 DERMATOPHYTOSIS OF NAIL: ICD-10-CM

## 2022-04-18 DIAGNOSIS — E11.51 TYPE II DIABETES MELLITUS WITH PERIPHERAL CIRCULATORY DISORDER (HCC): Primary | ICD-10-CM

## 2022-04-18 DIAGNOSIS — I73.9 PVD (PERIPHERAL VASCULAR DISEASE) (HCC): ICD-10-CM

## 2022-04-18 DIAGNOSIS — M79.671 BILATERAL FOOT PAIN: ICD-10-CM

## 2022-04-18 DIAGNOSIS — M79.672 BILATERAL FOOT PAIN: ICD-10-CM

## 2022-04-18 PROCEDURE — 4040F PNEUMOC VAC/ADMIN/RCVD: CPT | Performed by: PODIATRIST

## 2022-04-18 PROCEDURE — 1036F TOBACCO NON-USER: CPT | Performed by: PODIATRIST

## 2022-04-18 PROCEDURE — 11721 DEBRIDE NAIL 6 OR MORE: CPT | Performed by: PODIATRIST

## 2022-04-18 PROCEDURE — G8417 CALC BMI ABV UP PARAM F/U: HCPCS | Performed by: PODIATRIST

## 2022-04-18 PROCEDURE — G8427 DOCREV CUR MEDS BY ELIG CLIN: HCPCS | Performed by: PODIATRIST

## 2022-04-18 PROCEDURE — 1123F ACP DISCUSS/DSCN MKR DOCD: CPT | Performed by: PODIATRIST

## 2022-04-18 RX ORDER — TERBINAFINE HYDROCHLORIDE 250 MG/1
250 TABLET ORAL DAILY
Qty: 30 TABLET | Refills: 2 | Status: SHIPPED | OUTPATIENT
Start: 2022-04-18

## 2022-04-18 NOTE — PROGRESS NOTES
[address] [facility]  [address] [place] [id number]  Dept: [phone]  Dept Fax: [phone]    DIABETIC PROGRESS NOTE  Date of patient's visit: 4/18/2022  Patient's Name:  Shahida Powell YOB: 1940            Patient Care Team:  Viktoria Tracey MD as PCP - [address] DPM as Physician (Podiatry)  Digna Thurman DPM as Physician (Podiatry)          Chief Complaint   Patient presents with    Diabetes    Peripheral Neuropathy    Nail Problem    Toe Pain    Callouses       Subjective:   Shahida Powell comes to clinic for Diabetes, Peripheral Neuropathy, Nail Problem, Toe Pain, and Callouses    he is a diabetic and states that he is doing well. Pt currently has complaint of thickened, elongated nails that they cannot manage by themselves. Pt's primary care physician is Viktoria Tracey MD last seen 12/21/2021   Pt's last blood sugar was unknown. Lab Results   Component Value Date    LABA1C 7.4 (H) 11/16/2020      Complains of numbness in the feet bilat.   Past Medical History:   Diagnosis Date    Arthritis     Atrial fibrillation ([address]     CAD (coronary artery disease)     no stents    CHF (congestive heart failure) ([address]     patient denies    Diabetes ([address]     type 2    Diverticulosis     Hyperlipidemia     Obesity     АННА on CPAP     nightly    Poor historian     Renal stone     passed       Allergies   Allergen Reactions    Lisinopril      Patient unsure of reaction     Current Outpatient Medications on File Prior to Visit   Medication Sig Dispense Refill    diclofenac sodium (VOLTAREN) 1 % GEL Apply 2 g topically 2 times daily 100 g 0    diclofenac sodium (VOLTAREN) 1 % GEL Apply 2 g topically 4 times daily as needed for Pain 1 g 0    apixaban (ELIQUIS) 5 MG TABS tablet Take 5 mg by mouth 2 times daily      melatonin 3 MG TABS tablet Take 3 mg by mouth nightly as needed      fenofibrate (TRICOR) 145 MG tablet Take 145 mg by mouth daily       glimepiride (AMARYL) 4 MG tablet Take 4 mg by mouth every morning (before breakfast)       metFORMIN (GLUCOPHAGE) 500 MG tablet Take 1,000 mg by mouth 2 times daily (with meals)       aspirin 81 MG tablet Take 81 mg by mouth daily      atorvastatin (LIPITOR) 80 MG tablet Take 80 mg by mouth daily      VITAMIN D, CHOLECALCIFEROL, PO Take by mouth      B Complex Vitamins (VITAMIN B COMPLEX PO) Take by mouth      magnesium 30 MG tablet Take 30 mg by mouth 2 times daily      Cranberry 1000 MG CAPS Take by mouth       No current facility-administered medications on file prior to visit. Review of Systems    Review of Systems:   History obtained from chart review and the patient  General ROS: negative for - chills, fatigue, fever, night sweats or weight gain  Constitutional: Negative for chills, diaphoresis, fatigue, fever and unexpected weight change. Musculoskeletal: Positive for arthralgias, gait problem and joint swelling. Neurological ROS: negative for - behavioral changes, confusion, headaches or seizures. Negative for weakness and numbness. Dermatological ROS: negative for - mole changes, rash  Cardiovascular: Negative for leg swelling. Gastrointestinal: Negative for constipation, diarrhea, nausea and vomiting. Objective:  Dermatologic Exam:  Skin lesion/ulceration Absent . Skin No rashes or nodules noted. .   Skin is thin, with flaky sloughing skin as well as decreased hair growth to the lower leg  Small red hemosiderin deposits seen dorsal foot   Musculoskeletal:     1st MPJ ROM decreased, Bilateral.  Muscle strength 5/5, Bilateral.  Pain present upon palpation of toenails 1-5, Bilateral. decreased medial longitudinal arch, Bilateral.  Ankle ROM decreased,Bilateral.    Dorsally contracted digits present digits 2, Bilateral.     Vascular: DP pulses 1/4 bilateral.  PT pulses 0/4 bilateral.   CFT <5 seconds, Bilateral. Hair growth absent to the level of the digits, Bilateral.  Edema present, Bilateral.  Varicosities absent, Bilateral. Erythema absent, Bilateral    Neurological: Sensation diminshed to light touch to level of digits, Bilateral.  Protective sensation intact 6/10 sites via 5.07/10g [place] Monofilament, Bilateral.  negative Tinel's, Bilateral.  negative Valleix sign, Bilateral.      Integument: Warm, dry, supple, Bilateral.  Open lesion absent, Bilateral.  Interdigital maceration absent to web spaces 4, Bilateral.  Nails 1-5 left and 1-5 right thickened > 3.0 mm, dystrophic and crumbly, discolored with subungual debris. Fissures absent, Bilateral.   General: AAO x 3 in NAD.     Derm  Toenail Description  Sites of Onychomycosis Involvement (Check affected area)  [x] [x] [x] [x] [x] [x] [x] [x] [x] [x]  5 4 3 2 1 1 2 3 4 5                          Right                                        Left    Thickness  [x] [x] [x] [x] [x] [x] [x] [x] [x] [x]  5 4 3 2 1 1 2 3 4 5                         Right                                        Left    Dystrophic Changes   [x] [x] [x] [x] [x] [x] [x] [x] [x] [x]  5 4 3 2 1 1 2 3 4 5                         Right                                        Left    Color   [x] [x] [x] [x] [x] [x] [x] [x] [x] [x]  5 4 3 2 1 1 2 3 4 5                          Right                                        Left    Incurvation/Ingrowin   [] [] [] [] [] [] [] [] [] []  5 4 3 2 1 1 2 3 4 5                         Right                                        Left    Inflammation/Pain   [x] [x] [x] [x] [x] [x] [x] [x] [x] [x]  5 4 3 2 1 1 2 3 4 5                         Right                                        Left        Visual inspection:  Deformity: hammertoe deformity hali feet  amputation: absent  Skin lesions: absent  Edema: right- 2+ pitting edema, left- 2+ pitting edema    Sensory exam:  Monofilament sensation: abnormal - 6/10 via SW 5.07/10g monofilament to the plantar foot bilateral feet    Pulses: abnormal - 1/4 dorsalis pedis pulse and 0/4 Posterior tibial pulse,   Pinprick: Impaired  Proprioception: Impaired  Vibration (128 Hz): Impaired       DM with PVD       [x]Yes    []No      Assessment:  [de-identified] y.o. male with:   Diagnosis Orders   1. Type II diabetes mellitus with peripheral circulatory disorder (HCC)  [facility] DIABETES FOOT EXAM    66004 - MI DEBRIDEMENT OF NAILS, 6 OR MORE   2. Dermatophytosis of nail  [facility] DIABETES FOOT EXAM    91594 - MI DEBRIDEMENT OF NAILS, 6 OR MORE   3. PVD (peripheral vascular disease) ([facility]     4. Bilateral foot pain           Q7   []Yes    []No                Q8   [x]Yes    []No                     Q9   []Yes    []No    Plan:   Pt was evaluated and examined. Patient was given personalized discharge instructions. Discussed topical vs oral antifungal. he would like to proceed with oral lamisil pending LFT. Nails 1-10 were debrided sharply in length and thickness with a nipper and , without incident. All labs were reviewed and all imagining including the above findings were reviewed PRIOR to the patients arrival and with the patient today. Previous patient encounter was reviewed. Encounters from the patients other medical providers were reviewed and noted. Time was spent educating the patient on proper care of the feet and ankles. All the above diagnosis were addressed at todays visit and all questions were answered. A total of 20 minutes was spent with this patients encounter which included charting after the patients visit    Pt will follow up in 9 weeks or sooner if any problems arise. Diagnosis was discussed with the pt and all of their questions were answered in detail. Proper foot hygiene and care was discussed with the pt. Informed patient on proper diabetic foot care and importance of tight glycemic control. Patient to check feet daily and contact the office with any questions/problems/concerns.    Other comorbidity noted and will be managed by PCP.  4/18/2022    Electronically signed by Dimitri Chandler DPM on 4/18/2022 at 2:39 PM  4/18/2022

## 2022-11-29 RX ORDER — TERBINAFINE HYDROCHLORIDE 250 MG/1
TABLET ORAL
Qty: 30 TABLET | Refills: 2 | OUTPATIENT
Start: 2022-11-29

## 2023-02-17 RX ORDER — TERBINAFINE HYDROCHLORIDE 250 MG/1
TABLET ORAL
Qty: 30 TABLET | Refills: 2 | Status: SHIPPED | OUTPATIENT
Start: 2023-02-17

## 2023-06-09 ENCOUNTER — OFFICE VISIT (OUTPATIENT)
Dept: ORTHOPEDIC SURGERY | Age: 83
End: 2023-06-09
Payer: MEDICARE

## 2023-06-09 DIAGNOSIS — M25.562 LEFT KNEE PAIN, UNSPECIFIED CHRONICITY: Primary | ICD-10-CM

## 2023-06-09 PROCEDURE — 1123F ACP DISCUSS/DSCN MKR DOCD: CPT | Performed by: ORTHOPAEDIC SURGERY

## 2023-06-09 PROCEDURE — G8427 DOCREV CUR MEDS BY ELIG CLIN: HCPCS | Performed by: ORTHOPAEDIC SURGERY

## 2023-06-09 PROCEDURE — 99203 OFFICE O/P NEW LOW 30 MIN: CPT | Performed by: ORTHOPAEDIC SURGERY

## 2023-06-09 PROCEDURE — G8421 BMI NOT CALCULATED: HCPCS | Performed by: ORTHOPAEDIC SURGERY

## 2023-06-09 PROCEDURE — 4004F PT TOBACCO SCREEN RCVD TLK: CPT | Performed by: ORTHOPAEDIC SURGERY

## 2023-06-09 NOTE — PROGRESS NOTES
Belita Apgar M.D.            [facility] [address] [facility],[address], [address] [facility]           Dept Phone: [phone]           Dept Fax:  [phone]           Meghan Gonzalez          Dept Phone: [phone]           Dept Fax:  [phone]      Chief Compliant:  Chief Complaint   Patient presents with    Pain     Lt knee        History of Present Illness: This is a 81 y.o. male who presents to the clinic today for evaluation / follow up of severe left knee pain. Patient is an 81-year-old gentleman who has had knee pain for many years. 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 He has a history of CABG and does see cardiology and is on anticoagulants. Dr. Dominique Bassett his primary care physician. Review of Systems   Constitutional: Negative for fever, chills, sweats. Eyes: Negative for changes in vision, or pain. HENT: Negative for ear ache, epistaxis, or sore throat. Respiratory/Cardio: Negative for Chest pain, palpitations, SOB, or cough. Gastrointestinal: Negative for abdominal pain, N/V/D. Genitourinary: Negative for dysuria, frequency, urgency, or hematuria. Neurological: Negative for headache, numbness, or weakness. Integumentary: Negative for rash, itching, laceration, or abrasion. Musculoskeletal: Positive for Pain (Lt knee)       Physical Exam:  Constitutional: Patient is oriented to person, place, and time. Patient appears well-developed and well nourished. HENT: Negative otherwise noted  Head: Normocephalic and Atraumatic  Nose: Normal  Eyes: Conjunctivae and EOM are normal  Neck: Normal range of motion Neck supple.     Respiratory/Cardio: Effort normal. No respiratory

## 2024-02-02 ENCOUNTER — OFFICE VISIT (OUTPATIENT)
Dept: ORTHOPEDIC SURGERY | Age: 84
End: 2024-02-02

## 2024-02-02 VITALS — BODY MASS INDEX: 42.8 KG/M2 | HEIGHT: 69 IN | RESPIRATION RATE: 14 BRPM | WEIGHT: 289 LBS

## 2024-02-02 DIAGNOSIS — M25.562 LEFT KNEE PAIN, UNSPECIFIED CHRONICITY: Primary | ICD-10-CM

## 2024-02-02 RX ORDER — LIDOCAINE HYDROCHLORIDE 10 MG/ML
2 INJECTION, SOLUTION INFILTRATION; PERINEURAL ONCE
Status: COMPLETED | OUTPATIENT
Start: 2024-02-02 | End: 2024-02-02

## 2024-02-02 RX ORDER — BETAMETHASONE SODIUM PHOSPHATE AND BETAMETHASONE ACETATE 3; 3 MG/ML; MG/ML
12 INJECTION, SUSPENSION INTRA-ARTICULAR; INTRALESIONAL; INTRAMUSCULAR; SOFT TISSUE ONCE
Status: COMPLETED | OUTPATIENT
Start: 2024-02-02 | End: 2024-02-02

## 2024-02-02 RX ORDER — BUPIVACAINE HYDROCHLORIDE 5 MG/ML
2 INJECTION, SOLUTION PERINEURAL ONCE
Status: COMPLETED | OUTPATIENT
Start: 2024-02-02 | End: 2024-02-02

## 2024-02-02 RX ADMIN — BUPIVACAINE HYDROCHLORIDE 10 MG: 5 INJECTION, SOLUTION PERINEURAL at 10:27

## 2024-02-02 RX ADMIN — BETAMETHASONE SODIUM PHOSPHATE AND BETAMETHASONE ACETATE 12 MG: 3; 3 INJECTION, SUSPENSION INTRA-ARTICULAR; INTRALESIONAL; INTRAMUSCULAR; SOFT TISSUE at 10:26

## 2024-02-02 RX ADMIN — LIDOCAINE HYDROCHLORIDE 2 ML: 10 INJECTION, SOLUTION INFILTRATION; PERINEURAL at 10:27

## 2024-02-02 NOTE — PROGRESS NOTES
[facility]                   Alexy Camejo M.D.            [address], [address]               [place], [place] [address]           Dept Phone: [phone]           Dept Fax:  [phone] [address] [facility]                       [address]           [place], [place] [address]          Dept Phone: [phone]           Dept Fax:  [phone]      Chief Compliant:  Chief Complaint   Patient presents with    Knee Pain     left        History of Present Illness:  This is a 83 y.o. male who presents to the clinic today for evaluation / follow up of left knee.  Patient was seen recently this past June and actually had been discussion in having knee replacement patient has significant medical comorbidities and is here today stating that he is really not in that much pain is just that he slowed down a lot at night able to do everything he wants to do.  He actually is now a lot more hesitant about proceeding with any surgery..       Review of Systems   Constitutional: Negative for fever, chills, sweats.   Eyes: Negative for changes in vision, or pain.   HENT: Negative for ear ache, epistaxis, or sore throat.  Respiratory/Cardio: Negative for Chest pain, palpitations, SOB, or cough.  Gastrointestinal: Negative for abdominal pain, N/V/D.   Genitourinary: Negative for dysuria, frequency, urgency, or hematuria.   Neurological: Negative for headache, numbness, or weakness.   Integumentary: Negative for rash, itching, laceration, or abrasion.   Musculoskeletal: Positive for Knee Pain (left)       Physical Exam:  Constitutional: Patient is oriented to person, place, and time. Patient appears well-developed and well nourished.   HENT: Negative otherwise noted  Head: Normocephalic and Atraumatic  Nose: Normal  Eyes: Conjunctivae and EOM are normal  Neck: Normal range of motion Neck supple.    Respiratory/Cardio: Effort normal. No respiratory distress.  Musculoskeletal:

## 2024-03-23 ENCOUNTER — HOSPITAL ENCOUNTER (INPATIENT)
Age: 84
LOS: 15 days | Discharge: NURSING, REHAB OR LONG-TERM CARE | DRG: 064 | End: 2024-04-07
Attending: EMERGENCY MEDICINE | Admitting: HOSPITALIST
Payer: MEDICARE

## 2024-03-23 ENCOUNTER — APPOINTMENT (OUTPATIENT)
Dept: MRI IMAGING | Age: 84
DRG: 064 | End: 2024-03-23
Payer: MEDICARE

## 2024-03-23 ENCOUNTER — APPOINTMENT (OUTPATIENT)
Dept: CT IMAGING | Age: 84
DRG: 064 | End: 2024-03-23
Payer: MEDICARE

## 2024-03-23 ENCOUNTER — APPOINTMENT (OUTPATIENT)
Dept: GENERAL RADIOLOGY | Age: 84
DRG: 064 | End: 2024-03-23
Payer: MEDICARE

## 2024-03-23 DIAGNOSIS — D72.829 LEUKOCYTOSIS, UNSPECIFIED TYPE: ICD-10-CM

## 2024-03-23 DIAGNOSIS — R73.9 HYPERGLYCEMIA: ICD-10-CM

## 2024-03-23 DIAGNOSIS — I63.81 LACUNAR INFARCTION (HCC): Primary | ICD-10-CM

## 2024-03-23 DIAGNOSIS — R29.898 RIGHT ARM WEAKNESS: ICD-10-CM

## 2024-03-23 DIAGNOSIS — T79.6XXA TRAUMATIC RHABDOMYOLYSIS, INITIAL ENCOUNTER (HCC): ICD-10-CM

## 2024-03-23 PROBLEM — I61.9 CVA (CEREBROVASCULAR ACCIDENT DUE TO INTRACEREBRAL HEMORRHAGE) (HCC): Status: ACTIVE | Noted: 2024-03-23

## 2024-03-23 PROBLEM — G93.40 ENCEPHALOPATHY: Status: ACTIVE | Noted: 2024-03-23

## 2024-03-23 PROBLEM — F03.90 DEMENTIA WITHOUT BEHAVIORAL DISTURBANCE, PSYCHOTIC DISTURBANCE, MOOD DISTURBANCE, OR ANXIETY (HCC): Status: ACTIVE | Noted: 2024-03-23

## 2024-03-23 LAB
ALBUMIN SERPL-MCNC: 3.8 G/DL (ref 3.5–5.2)
ALP SERPL-CCNC: 85 U/L (ref 40–129)
ALT SERPL-CCNC: 28 U/L (ref 5–41)
ANION GAP SERPL CALCULATED.3IONS-SCNC: 11 MMOL/L (ref 9–17)
ANION GAP SERPL CALCULATED.3IONS-SCNC: 21 MMOL/L (ref 9–17)
AST SERPL-CCNC: 58 U/L
B-OH-BUTYR SERPL-MCNC: 4.34 MMOL/L (ref 0.02–0.27)
BASOPHILS # BLD: 0 K/UL (ref 0–0.2)
BASOPHILS NFR BLD: 0 %
BILIRUB DIRECT SERPL-MCNC: 0.4 MG/DL
BILIRUB INDIRECT SERPL-MCNC: 0.6 MG/DL (ref 0–1)
BILIRUB SERPL-MCNC: 1 MG/DL (ref 0.3–1.2)
BILIRUB UR QL STRIP: ABNORMAL
BUN SERPL-MCNC: 25 MG/DL (ref 8–23)
BUN SERPL-MCNC: 28 MG/DL (ref 8–23)
BUN/CREAT SERPL: 19 (ref 9–20)
BUN/CREAT SERPL: 25 (ref 9–20)
CALCIUM SERPL-MCNC: 8.8 MG/DL (ref 8.6–10.4)
CALCIUM SERPL-MCNC: 9.5 MG/DL (ref 8.6–10.4)
CASTS #/AREA URNS LPF: ABNORMAL /LPF
CHLORIDE SERPL-SCNC: 94 MMOL/L (ref 98–107)
CHLORIDE SERPL-SCNC: 98 MMOL/L (ref 98–107)
CHOLEST SERPL-MCNC: 148 MG/DL (ref 0–199)
CHOLESTEROL/HDL RATIO: 5
CK SERPL-CCNC: 2954 U/L (ref 39–308)
CLARITY UR: ABNORMAL
CO2 SERPL-SCNC: 18 MMOL/L (ref 20–31)
CO2 SERPL-SCNC: 26 MMOL/L (ref 20–31)
COLOR UR: YELLOW
CREAT SERPL-MCNC: 1.1 MG/DL (ref 0.7–1.2)
CREAT SERPL-MCNC: 1.3 MG/DL (ref 0.7–1.2)
EKG ATRIAL RATE: 51 BPM
EKG Q-T INTERVAL: 452 MS
EKG QRS DURATION: 164 MS
EKG QTC CALCULATION (BAZETT): 549 MS
EKG R AXIS: 84 DEGREES
EKG T AXIS: -4 DEGREES
EKG VENTRICULAR RATE: 89 BPM
EOSINOPHIL # BLD: 0 K/UL (ref 0–0.4)
EOSINOPHILS RELATIVE PERCENT: 0 % (ref 1–4)
EPI CELLS #/AREA URNS HPF: ABNORMAL /HPF (ref 0–5)
ERYTHROCYTE [DISTWIDTH] IN BLOOD BY AUTOMATED COUNT: 15.3 % (ref 11.8–14.4)
EST. AVERAGE GLUCOSE BLD GHB EST-MCNC: 258 MG/DL
FLUAV RNA RESP QL NAA+PROBE: NOT DETECTED
FLUBV RNA RESP QL NAA+PROBE: NOT DETECTED
GFR SERPL CREATININE-BSD FRML MDRD: 55 ML/MIN/1.73M2
GFR SERPL CREATININE-BSD FRML MDRD: >60 ML/MIN/1.73M2
GLUCOSE BLD-MCNC: 299 MG/DL (ref 75–110)
GLUCOSE BLD-MCNC: 366 MG/DL (ref 75–110)
GLUCOSE BLD-MCNC: 493 MG/DL (ref 75–110)
GLUCOSE SERPL-MCNC: 173 MG/DL (ref 70–99)
GLUCOSE SERPL-MCNC: 554 MG/DL (ref 70–99)
GLUCOSE UR STRIP-MCNC: ABNORMAL MG/DL
HBA1C MFR BLD: 10.6 % (ref 4–6)
HCO3 VENOUS: 18.2 MMOL/L (ref 22–29)
HCT VFR BLD AUTO: 45.4 % (ref 40.7–50.3)
HDLC SERPL-MCNC: 31 MG/DL
HGB BLD-MCNC: 15.3 G/DL (ref 13–17)
HGB UR QL STRIP.AUTO: ABNORMAL
IMM GRANULOCYTES # BLD AUTO: 0 K/UL (ref 0–0.3)
IMM GRANULOCYTES NFR BLD: 0 %
INR PPP: 1.1
KETONES UR STRIP-MCNC: ABNORMAL MG/DL
LACTATE BLDV-SCNC: 1.5 MMOL/L (ref 0.5–1.9)
LACTATE BLDV-SCNC: 1.8 MMOL/L (ref 0.5–1.9)
LACTATE BLDV-SCNC: 2.2 MMOL/L (ref 0.5–1.9)
LACTATE BLDV-SCNC: 3.5 MMOL/L (ref 0.5–1.9)
LDLC SERPL CALC-MCNC: 73 MG/DL (ref 0–100)
LEUKOCYTE ESTERASE UR QL STRIP: NEGATIVE
LIPASE SERPL-CCNC: 37 U/L (ref 13–60)
LYMPHOCYTES NFR BLD: 0.35 K/UL (ref 1–4.8)
LYMPHOCYTES RELATIVE PERCENT: 2 % (ref 24–44)
MAGNESIUM SERPL-MCNC: 1.7 MG/DL (ref 1.6–2.6)
MCH RBC QN AUTO: 27.8 PG (ref 25.2–33.5)
MCHC RBC AUTO-ENTMCNC: 33.7 G/DL (ref 28.4–34.8)
MCV RBC AUTO: 82.5 FL (ref 82.6–102.9)
MONOCYTES NFR BLD: 0.69 K/UL (ref 0.2–0.8)
MONOCYTES NFR BLD: 4 % (ref 1–7)
MUCOUS THREADS URNS QL MICRO: ABNORMAL
MYOGLOBIN SERPL-MCNC: 8553 NG/ML (ref 28–72)
NEGATIVE BASE EXCESS, VEN: 4.5 MMOL/L (ref 0–2)
NEUTROPHILS NFR BLD: 94 % (ref 36–66)
NEUTS SEG NFR BLD: 16.26 K/UL (ref 1.8–7.7)
NITRITE UR QL STRIP: NEGATIVE
NRBC BLD-RTO: 0 PER 100 WBC
O2 SAT, VEN: 99.2 % (ref 60–85)
PARTIAL THROMBOPLASTIN TIME: 24.8 SEC (ref 23.9–33.8)
PCO2, VEN: 27.6 MM HG (ref 41–51)
PH UR STRIP: 5.5 [PH] (ref 5–8)
PH VENOUS: 7.43 (ref 7.32–7.43)
PLATELET # BLD AUTO: 238 K/UL (ref 138–453)
PMV BLD AUTO: 9.6 FL (ref 8.1–13.5)
PO2, VEN: 135.4 MM HG (ref 30–50)
POTASSIUM SERPL-SCNC: 3.5 MMOL/L (ref 3.7–5.3)
POTASSIUM SERPL-SCNC: 4.1 MMOL/L (ref 3.7–5.3)
PROCALCITONIN SERPL-MCNC: 4.09 NG/ML (ref 0–0.09)
PROT SERPL-MCNC: 6.9 G/DL (ref 6.4–8.3)
PROT UR STRIP-MCNC: ABNORMAL MG/DL
PROTHROMBIN TIME: 14.1 SEC (ref 11.5–14.2)
RBC # BLD AUTO: 5.5 M/UL (ref 4.21–5.77)
RBC #/AREA URNS HPF: ABNORMAL /HPF (ref 0–2)
SARS-COV-2 RNA RESP QL NAA+PROBE: NOT DETECTED
SODIUM SERPL-SCNC: 133 MMOL/L (ref 135–144)
SODIUM SERPL-SCNC: 135 MMOL/L (ref 135–144)
SOURCE: NORMAL
SP GR UR STRIP: 1.02 (ref 1–1.03)
SPECIMEN DESCRIPTION: NORMAL
TRIGL SERPL-MCNC: 219 MG/DL
TROPONIN I SERPL HS-MCNC: 34 NG/L (ref 0–22)
TROPONIN I SERPL HS-MCNC: 36 NG/L (ref 0–22)
UROBILINOGEN UR STRIP-ACNC: NORMAL EU/DL (ref 0–1)
VLDLC SERPL CALC-MCNC: 44 MG/DL
WBC #/AREA URNS HPF: ABNORMAL /HPF (ref 0–5)
WBC OTHER # BLD: 17.3 K/UL (ref 3.5–11.3)

## 2024-03-23 PROCEDURE — 71045 X-RAY EXAM CHEST 1 VIEW: CPT

## 2024-03-23 PROCEDURE — 93010 ELECTROCARDIOGRAM REPORT: CPT | Performed by: INTERNAL MEDICINE

## 2024-03-23 PROCEDURE — 99223 1ST HOSP IP/OBS HIGH 75: CPT | Performed by: PSYCHIATRY & NEUROLOGY

## 2024-03-23 PROCEDURE — 70450 CT HEAD/BRAIN W/O DYE: CPT

## 2024-03-23 PROCEDURE — 82947 ASSAY GLUCOSE BLOOD QUANT: CPT

## 2024-03-23 PROCEDURE — 84145 PROCALCITONIN (PCT): CPT

## 2024-03-23 PROCEDURE — 83690 ASSAY OF LIPASE: CPT

## 2024-03-23 PROCEDURE — 83874 ASSAY OF MYOGLOBIN: CPT

## 2024-03-23 PROCEDURE — 72131 CT LUMBAR SPINE W/O DYE: CPT

## 2024-03-23 PROCEDURE — 2060000000 HC ICU INTERMEDIATE R&B

## 2024-03-23 PROCEDURE — 80048 BASIC METABOLIC PNL TOTAL CA: CPT

## 2024-03-23 PROCEDURE — 70547 MR ANGIOGRAPHY NECK W/O DYE: CPT

## 2024-03-23 PROCEDURE — 83036 HEMOGLOBIN GLYCOSYLATED A1C: CPT

## 2024-03-23 PROCEDURE — 99285 EMERGENCY DEPT VISIT HI MDM: CPT

## 2024-03-23 PROCEDURE — 82010 KETONE BODYS QUAN: CPT

## 2024-03-23 PROCEDURE — 85610 PROTHROMBIN TIME: CPT

## 2024-03-23 PROCEDURE — 70551 MRI BRAIN STEM W/O DYE: CPT

## 2024-03-23 PROCEDURE — 36415 COLL VENOUS BLD VENIPUNCTURE: CPT

## 2024-03-23 PROCEDURE — 2580000003 HC RX 258: Performed by: HOSPITALIST

## 2024-03-23 PROCEDURE — 87636 SARSCOV2 & INF A&B AMP PRB: CPT

## 2024-03-23 PROCEDURE — 83735 ASSAY OF MAGNESIUM: CPT

## 2024-03-23 PROCEDURE — 83605 ASSAY OF LACTIC ACID: CPT

## 2024-03-23 PROCEDURE — 93005 ELECTROCARDIOGRAM TRACING: CPT | Performed by: EMERGENCY MEDICINE

## 2024-03-23 PROCEDURE — 82550 ASSAY OF CK (CPK): CPT

## 2024-03-23 PROCEDURE — 80076 HEPATIC FUNCTION PANEL: CPT

## 2024-03-23 PROCEDURE — 6370000000 HC RX 637 (ALT 250 FOR IP): Performed by: HOSPITALIST

## 2024-03-23 PROCEDURE — 85730 THROMBOPLASTIN TIME PARTIAL: CPT

## 2024-03-23 PROCEDURE — 85025 COMPLETE CBC W/AUTO DIFF WBC: CPT

## 2024-03-23 PROCEDURE — 81001 URINALYSIS AUTO W/SCOPE: CPT

## 2024-03-23 PROCEDURE — 2580000003 HC RX 258: Performed by: EMERGENCY MEDICINE

## 2024-03-23 PROCEDURE — 87040 BLOOD CULTURE FOR BACTERIA: CPT

## 2024-03-23 PROCEDURE — 84484 ASSAY OF TROPONIN QUANT: CPT

## 2024-03-23 PROCEDURE — 80061 LIPID PANEL: CPT

## 2024-03-23 PROCEDURE — 82803 BLOOD GASES ANY COMBINATION: CPT

## 2024-03-23 RX ORDER — INSULIN LISPRO 100 [IU]/ML
0-16 INJECTION, SOLUTION INTRAVENOUS; SUBCUTANEOUS
Status: DISCONTINUED | OUTPATIENT
Start: 2024-03-23 | End: 2024-04-07 | Stop reason: HOSPADM

## 2024-03-23 RX ORDER — ATORVASTATIN CALCIUM 80 MG/1
80 TABLET, FILM COATED ORAL DAILY
Status: DISCONTINUED | OUTPATIENT
Start: 2024-03-23 | End: 2024-04-07 | Stop reason: HOSPADM

## 2024-03-23 RX ORDER — SODIUM CHLORIDE 9 MG/ML
INJECTION, SOLUTION INTRAVENOUS PRN
Status: DISCONTINUED | OUTPATIENT
Start: 2024-03-23 | End: 2024-04-07 | Stop reason: HOSPADM

## 2024-03-23 RX ORDER — SENNOSIDES A AND B 8.6 MG/1
1 TABLET, FILM COATED ORAL 2 TIMES DAILY PRN
Status: DISCONTINUED | OUTPATIENT
Start: 2024-03-23 | End: 2024-04-07 | Stop reason: HOSPADM

## 2024-03-23 RX ORDER — INSULIN LISPRO 100 [IU]/ML
5 INJECTION, SOLUTION INTRAVENOUS; SUBCUTANEOUS
Status: DISCONTINUED | OUTPATIENT
Start: 2024-03-23 | End: 2024-04-07 | Stop reason: HOSPADM

## 2024-03-23 RX ORDER — ONDANSETRON 2 MG/ML
4 INJECTION INTRAMUSCULAR; INTRAVENOUS EVERY 6 HOURS PRN
Status: DISCONTINUED | OUTPATIENT
Start: 2024-03-23 | End: 2024-04-07 | Stop reason: HOSPADM

## 2024-03-23 RX ORDER — SODIUM CHLORIDE 0.9 % (FLUSH) 0.9 %
10 SYRINGE (ML) INJECTION EVERY 12 HOURS SCHEDULED
Status: DISCONTINUED | OUTPATIENT
Start: 2024-03-23 | End: 2024-04-07 | Stop reason: HOSPADM

## 2024-03-23 RX ORDER — INSULIN LISPRO 100 [IU]/ML
0-4 INJECTION, SOLUTION INTRAVENOUS; SUBCUTANEOUS NIGHTLY
Status: DISCONTINUED | OUTPATIENT
Start: 2024-03-23 | End: 2024-04-07 | Stop reason: HOSPADM

## 2024-03-23 RX ORDER — POTASSIUM CHLORIDE 7.45 MG/ML
10 INJECTION INTRAVENOUS PRN
Status: DISCONTINUED | OUTPATIENT
Start: 2024-03-23 | End: 2024-04-07 | Stop reason: HOSPADM

## 2024-03-23 RX ORDER — 0.9 % SODIUM CHLORIDE 0.9 %
1000 INTRAVENOUS SOLUTION INTRAVENOUS ONCE
Status: DISCONTINUED | OUTPATIENT
Start: 2024-03-23 | End: 2024-04-07 | Stop reason: HOSPADM

## 2024-03-23 RX ORDER — POTASSIUM CHLORIDE 20 MEQ/1
40 TABLET, EXTENDED RELEASE ORAL PRN
Status: DISCONTINUED | OUTPATIENT
Start: 2024-03-23 | End: 2024-04-07 | Stop reason: HOSPADM

## 2024-03-23 RX ORDER — 0.9 % SODIUM CHLORIDE 0.9 %
1000 INTRAVENOUS SOLUTION INTRAVENOUS ONCE
Status: COMPLETED | OUTPATIENT
Start: 2024-03-23 | End: 2024-03-23

## 2024-03-23 RX ORDER — SODIUM CHLORIDE 9 MG/ML
INJECTION, SOLUTION INTRAVENOUS CONTINUOUS
Status: DISCONTINUED | OUTPATIENT
Start: 2024-03-23 | End: 2024-03-25

## 2024-03-23 RX ORDER — MAGNESIUM SULFATE 1 G/100ML
1000 INJECTION INTRAVENOUS PRN
Status: DISCONTINUED | OUTPATIENT
Start: 2024-03-23 | End: 2024-04-07 | Stop reason: HOSPADM

## 2024-03-23 RX ORDER — ONDANSETRON 4 MG/1
4 TABLET, ORALLY DISINTEGRATING ORAL EVERY 8 HOURS PRN
Status: DISCONTINUED | OUTPATIENT
Start: 2024-03-23 | End: 2024-04-07 | Stop reason: HOSPADM

## 2024-03-23 RX ORDER — ACETAMINOPHEN 650 MG/1
650 SUPPOSITORY RECTAL EVERY 6 HOURS PRN
Status: DISCONTINUED | OUTPATIENT
Start: 2024-03-23 | End: 2024-04-07 | Stop reason: HOSPADM

## 2024-03-23 RX ORDER — ASPIRIN 81 MG/1
81 TABLET, CHEWABLE ORAL DAILY
Status: DISCONTINUED | OUTPATIENT
Start: 2024-03-23 | End: 2024-04-07 | Stop reason: HOSPADM

## 2024-03-23 RX ORDER — DEXTROSE MONOHYDRATE 100 MG/ML
INJECTION, SOLUTION INTRAVENOUS CONTINUOUS PRN
Status: DISCONTINUED | OUTPATIENT
Start: 2024-03-23 | End: 2024-04-07 | Stop reason: HOSPADM

## 2024-03-23 RX ORDER — INSULIN GLARGINE 100 [IU]/ML
10 INJECTION, SOLUTION SUBCUTANEOUS DAILY
Status: DISCONTINUED | OUTPATIENT
Start: 2024-03-23 | End: 2024-04-07 | Stop reason: HOSPADM

## 2024-03-23 RX ORDER — ACETAMINOPHEN 325 MG/1
650 TABLET ORAL EVERY 6 HOURS PRN
Status: DISCONTINUED | OUTPATIENT
Start: 2024-03-23 | End: 2024-04-07 | Stop reason: HOSPADM

## 2024-03-23 RX ORDER — GLIPIZIDE 10 MG/1
10 TABLET ORAL
Status: DISCONTINUED | OUTPATIENT
Start: 2024-03-24 | End: 2024-04-07 | Stop reason: HOSPADM

## 2024-03-23 RX ORDER — SODIUM CHLORIDE 0.9 % (FLUSH) 0.9 %
10 SYRINGE (ML) INJECTION PRN
Status: DISCONTINUED | OUTPATIENT
Start: 2024-03-23 | End: 2024-04-07 | Stop reason: HOSPADM

## 2024-03-23 RX ADMIN — SODIUM CHLORIDE, PRESERVATIVE FREE 10 ML: 5 INJECTION INTRAVENOUS at 21:11

## 2024-03-23 RX ADMIN — INSULIN LISPRO 16 UNITS: 100 INJECTION, SOLUTION INTRAVENOUS; SUBCUTANEOUS at 14:15

## 2024-03-23 RX ADMIN — SODIUM CHLORIDE 1000 ML: 9 INJECTION, SOLUTION INTRAVENOUS at 12:19

## 2024-03-23 RX ADMIN — ATORVASTATIN CALCIUM 80 MG: 80 TABLET, FILM COATED ORAL at 21:26

## 2024-03-23 RX ADMIN — SODIUM CHLORIDE: 9 INJECTION, SOLUTION INTRAVENOUS at 15:23

## 2024-03-23 RX ADMIN — APIXABAN 5 MG: 5 TABLET, FILM COATED ORAL at 21:11

## 2024-03-23 RX ADMIN — INSULIN GLARGINE 10 UNITS: 100 INJECTION, SOLUTION SUBCUTANEOUS at 14:14

## 2024-03-23 RX ADMIN — INSULIN LISPRO 5 UNITS: 100 INJECTION, SOLUTION INTRAVENOUS; SUBCUTANEOUS at 17:19

## 2024-03-23 RX ADMIN — INSULIN LISPRO 16 UNITS: 100 INJECTION, SOLUTION INTRAVENOUS; SUBCUTANEOUS at 17:19

## 2024-03-23 ASSESSMENT — LIFESTYLE VARIABLES
HOW OFTEN DO YOU HAVE A DRINK CONTAINING ALCOHOL: NEVER
HOW MANY STANDARD DRINKS CONTAINING ALCOHOL DO YOU HAVE ON A TYPICAL DAY: 1 OR 2
HOW MANY STANDARD DRINKS CONTAINING ALCOHOL DO YOU HAVE ON A TYPICAL DAY: PATIENT DOES NOT DRINK
HOW OFTEN DO YOU HAVE A DRINK CONTAINING ALCOHOL: MONTHLY OR LESS

## 2024-03-23 ASSESSMENT — PAIN - FUNCTIONAL ASSESSMENT: PAIN_FUNCTIONAL_ASSESSMENT: NONE - DENIES PAIN

## 2024-03-23 NOTE — H&P
Wiley, MD        Allergies:       Lisinopril    Social History:     Tobacco:    reports that he quit smoking about 49 years ago. His smoking use included cigarettes. He has never used smokeless tobacco.  Alcohol:      reports current alcohol use of about 2.0 standard drinks of alcohol per week.  Drug Use:  reports no history of drug use.    Family History:     Family History   Problem Relation Age of Onset    Other Neg Hx         blood clots         Physical Exam:     Vitals:  BP (!) 154/78   Pulse 76   Temp 98.8 °F (37.1 °C) (Oral)   Resp 18   Ht 1.829 m (6')   Wt 131.1 kg (289 lb)   SpO2 96%   BMI 39.20 kg/m²   Temp (24hrs), Av.8 °F (37.1 °C), Min:98.8 °F (37.1 °C), Max:98.8 °F (37.1 °C)          General appearance - alert, well appearing, and in no acute distress  Mental status - oriented to person, place, and time with normal affect  Head - normocephalic and atraumatic  Eyes - pupils equal and reactive, extraocular eye movements intact, conjunctiva clear  Ears - hearing appears to be intact  Nose - no drainage noted  Mouth - mucous membranes moist  Neck - supple, no carotid bruits, thyroid not palpable  Chest - clear to auscultation, normal effort  Heart - normal rate, regular rhythm, no murmur  Abdomen - soft, nontender, nondistended, bowel sounds present all four quadrants, no masses, hepatomegaly or splenomegaly  Neurological - normal speech, no focal findings or movement disorder noted, cranial nerves II through XII grossly intact  Extremities - peripheral pulses palpable, no pedal edema or calf pain with palpation  Skin - no gross lesions, rashes, or induration noted        Data:     Labs:    Hematology:  Recent Labs     24  0930   WBC 17.3*   RBC 5.50   HGB 15.3   HCT 45.4   MCV 82.5*   MCH 27.8   MCHC 33.7   RDW 15.3*      MPV 9.6   INR 1.1     Chemistry:  Recent Labs     24  0930 24  1117   *  --    K 4.1  --    CL 94*  --    CO2 18*  --    GLUCOSE 554*  --

## 2024-03-23 NOTE — ED TRIAGE NOTES
Pt arrives from home via ambulance after spouse found him on floor near bed pt unable to get up so spouse called 911 and children pt has h/o dementia and dose not recall falling upon exam pt drowsy but rouses easily and answers simple questions and follow simple commands appropriately no obvious area of injury noted skin pwd resp non labored.

## 2024-03-23 NOTE — ED PROVIDER NOTES
EMERGENCY DEPARTMENT ENCOUNTER    Pt Name: Keith Oreilly  MRN: [id number]  Birthdate 1940  Date of evaluation: 3/23/24  CHIEF COMPLAINT       Chief Complaint   Patient presents with    Fall     Pt's daughter states she was called by her mother who informed her that pt had fallen out of bed upon arrival pt was found on floor but thought he was still in bed and did not recall falling pt has h/o dementia pt was brought to ed per [facility]     HISTORY OF PRESENT ILLNESS   This is an 83-year-old female who presents the emergency department with complaints of being found on the floor.  The patient has a history of some mild dementia, lives at home with his wife, when she got up this morning and noted that the patient was on the floor and had been on the floor throughout the evening.  She states that he felt like he was actually in his bed.            REVIEW OF SYSTEMS     Review of Systems   Constitutional:  Positive for fatigue. Negative for chills and fever.   HENT:  Negative for rhinorrhea and sore throat.    Eyes:  Negative for discharge, redness and visual disturbance.   Respiratory:  Negative for cough and shortness of breath.    Cardiovascular:  Negative for chest pain, palpitations and leg swelling.   Gastrointestinal:  Negative for diarrhea, nausea and vomiting.   Musculoskeletal:  Positive for back pain. Negative for arthralgias, myalgias and neck pain.   Skin:  Negative for color change and rash.   Neurological:  Positive for weakness. Negative for seizures and headaches.   Psychiatric/Behavioral:  Negative for hallucinations, self-injury and suicidal ideas.      PASTMEDICAL HISTORY     Past Medical History:   Diagnosis Date    Arthritis     Atrial fibrillation (HCC)     CAD (coronary artery disease)     no stents    CHF (congestive heart failure) (HCC)     patient denies    Diabetes (HCC)     type 2    Diverticulosis     Hyperlipidemia     Obesity     АННА on CPAP     nightly    Poor historian     Renal

## 2024-03-23 NOTE — FLOWSHEET NOTE
Pt resting on cart denies pain no resp distress noted pt remains confused has asked several times were he is and why incontinent of urine twice dtr remains at bedside aware of plans for admission

## 2024-03-24 ENCOUNTER — APPOINTMENT (OUTPATIENT)
Dept: GENERAL RADIOLOGY | Age: 84
DRG: 064 | End: 2024-03-24
Payer: MEDICARE

## 2024-03-24 PROBLEM — D72.829 LEUKOCYTOSIS: Status: ACTIVE | Noted: 2024-03-24

## 2024-03-24 PROBLEM — I63.81 LACUNAR INFARCTION (HCC): Status: ACTIVE | Noted: 2024-03-24

## 2024-03-24 LAB
ANION GAP SERPL CALCULATED.3IONS-SCNC: 13 MMOL/L (ref 9–17)
BASOPHILS # BLD: 0.05 K/UL (ref 0–0.2)
BASOPHILS NFR BLD: 0 % (ref 0–2)
BUN SERPL-MCNC: 30 MG/DL (ref 8–23)
BUN/CREAT SERPL: 30 (ref 9–20)
CALCIUM SERPL-MCNC: 8.7 MG/DL (ref 8.6–10.4)
CHLORIDE SERPL-SCNC: 97 MMOL/L (ref 98–107)
CK SERPL-CCNC: 4688 U/L (ref 39–308)
CO2 SERPL-SCNC: 24 MMOL/L (ref 20–31)
CREAT SERPL-MCNC: 1 MG/DL (ref 0.7–1.2)
EOSINOPHIL # BLD: 0.24 K/UL (ref 0–0.44)
EOSINOPHILS RELATIVE PERCENT: 2 % (ref 1–4)
ERYTHROCYTE [DISTWIDTH] IN BLOOD BY AUTOMATED COUNT: 15.6 % (ref 11.8–14.4)
GFR SERPL CREATININE-BSD FRML MDRD: >60 ML/MIN/1.73M2
GLUCOSE BLD-MCNC: 156 MG/DL (ref 75–110)
GLUCOSE BLD-MCNC: 181 MG/DL (ref 75–110)
GLUCOSE BLD-MCNC: 235 MG/DL (ref 75–110)
GLUCOSE BLD-MCNC: 238 MG/DL (ref 75–110)
GLUCOSE SERPL-MCNC: 228 MG/DL (ref 70–99)
HCT VFR BLD AUTO: 40.9 % (ref 40.7–50.3)
HGB BLD-MCNC: 13.6 G/DL (ref 13–17)
IMM GRANULOCYTES # BLD AUTO: 0.04 K/UL (ref 0–0.3)
IMM GRANULOCYTES NFR BLD: 0 %
LYMPHOCYTES NFR BLD: 1.6 K/UL (ref 1.1–3.7)
LYMPHOCYTES RELATIVE PERCENT: 13 % (ref 24–43)
MCH RBC QN AUTO: 27.8 PG (ref 25.2–33.5)
MCHC RBC AUTO-ENTMCNC: 33.3 G/DL (ref 28.4–34.8)
MCV RBC AUTO: 83.5 FL (ref 82.6–102.9)
MONOCYTES NFR BLD: 1.17 K/UL (ref 0.1–1.2)
MONOCYTES NFR BLD: 10 % (ref 3–12)
MYOGLOBIN SERPL-MCNC: 1538 NG/ML (ref 28–72)
NEUTROPHILS NFR BLD: 75 % (ref 36–65)
NEUTS SEG NFR BLD: 9.22 K/UL (ref 1.5–8.1)
NRBC BLD-RTO: 0 PER 100 WBC
PLATELET # BLD AUTO: 228 K/UL (ref 138–453)
PMV BLD AUTO: 9.6 FL (ref 8.1–13.5)
POTASSIUM SERPL-SCNC: 3.7 MMOL/L (ref 3.7–5.3)
RBC # BLD AUTO: 4.9 M/UL (ref 4.21–5.77)
RBC # BLD: ABNORMAL 10*6/UL
SODIUM SERPL-SCNC: 134 MMOL/L (ref 135–144)
WBC OTHER # BLD: 12.3 K/UL (ref 3.5–11.3)

## 2024-03-24 PROCEDURE — 82947 ASSAY GLUCOSE BLOOD QUANT: CPT

## 2024-03-24 PROCEDURE — 99233 SBSQ HOSP IP/OBS HIGH 50: CPT | Performed by: PSYCHIATRY & NEUROLOGY

## 2024-03-24 PROCEDURE — 80048 BASIC METABOLIC PNL TOTAL CA: CPT

## 2024-03-24 PROCEDURE — 95819 EEG AWAKE AND ASLEEP: CPT | Performed by: PSYCHIATRY & NEUROLOGY

## 2024-03-24 PROCEDURE — 2060000000 HC ICU INTERMEDIATE R&B

## 2024-03-24 PROCEDURE — 85025 COMPLETE CBC W/AUTO DIFF WBC: CPT

## 2024-03-24 PROCEDURE — 73030 X-RAY EXAM OF SHOULDER: CPT

## 2024-03-24 PROCEDURE — 97535 SELF CARE MNGMENT TRAINING: CPT

## 2024-03-24 PROCEDURE — 97530 THERAPEUTIC ACTIVITIES: CPT

## 2024-03-24 PROCEDURE — 97166 OT EVAL MOD COMPLEX 45 MIN: CPT

## 2024-03-24 PROCEDURE — 99222 1ST HOSP IP/OBS MODERATE 55: CPT | Performed by: INTERNAL MEDICINE

## 2024-03-24 PROCEDURE — 2580000003 HC RX 258: Performed by: HOSPITALIST

## 2024-03-24 PROCEDURE — 36415 COLL VENOUS BLD VENIPUNCTURE: CPT

## 2024-03-24 PROCEDURE — 97116 GAIT TRAINING THERAPY: CPT

## 2024-03-24 PROCEDURE — 73060 X-RAY EXAM OF HUMERUS: CPT

## 2024-03-24 PROCEDURE — 83874 ASSAY OF MYOGLOBIN: CPT

## 2024-03-24 PROCEDURE — 6370000000 HC RX 637 (ALT 250 FOR IP): Performed by: HOSPITALIST

## 2024-03-24 PROCEDURE — 95816 EEG AWAKE AND DROWSY: CPT

## 2024-03-24 PROCEDURE — 97163 PT EVAL HIGH COMPLEX 45 MIN: CPT

## 2024-03-24 PROCEDURE — 82550 ASSAY OF CK (CPK): CPT

## 2024-03-24 RX ADMIN — INSULIN GLARGINE 10 UNITS: 100 INJECTION, SOLUTION SUBCUTANEOUS at 08:02

## 2024-03-24 RX ADMIN — GLIPIZIDE 10 MG: 10 TABLET ORAL at 08:02

## 2024-03-24 RX ADMIN — INSULIN LISPRO 5 UNITS: 100 INJECTION, SOLUTION INTRAVENOUS; SUBCUTANEOUS at 17:40

## 2024-03-24 RX ADMIN — ATORVASTATIN CALCIUM 80 MG: 80 TABLET, FILM COATED ORAL at 20:05

## 2024-03-24 RX ADMIN — SODIUM CHLORIDE: 9 INJECTION, SOLUTION INTRAVENOUS at 22:12

## 2024-03-24 RX ADMIN — ASPIRIN 81 MG CHEWABLE TABLET 81 MG: 81 TABLET CHEWABLE at 08:02

## 2024-03-24 RX ADMIN — APIXABAN 5 MG: 5 TABLET, FILM COATED ORAL at 08:02

## 2024-03-24 RX ADMIN — INSULIN LISPRO 5 UNITS: 100 INJECTION, SOLUTION INTRAVENOUS; SUBCUTANEOUS at 08:03

## 2024-03-24 RX ADMIN — INSULIN LISPRO 5 UNITS: 100 INJECTION, SOLUTION INTRAVENOUS; SUBCUTANEOUS at 12:43

## 2024-03-24 RX ADMIN — APIXABAN 5 MG: 5 TABLET, FILM COATED ORAL at 20:05

## 2024-03-24 RX ADMIN — INSULIN LISPRO 4 UNITS: 100 INJECTION, SOLUTION INTRAVENOUS; SUBCUTANEOUS at 08:01

## 2024-03-24 ASSESSMENT — PAIN SCALES - GENERAL: PAINLEVEL_OUTOF10: 0

## 2024-03-24 NOTE — PROCEDURES
EEG REPORT     CLINICAL NEUROPHYSIOLOGY LABORATORY  DEPARTMENT OF NEUROLOGY  [facility]       Patient: Keith Oreilly Age: 83 y.o.  MRN: [id number]      Referring Provider: No ref. provider found    History: This routine 30 minute scalp EEG was recorded with video- monitoring for a 83 y.o.. male  who presented with encephalopathy. This EEG was performed to evaluate for focal and epileptiform abnormalities.     Keith Oreilly   Current Facility-Administered Medications   Medication Dose Route Frequency Provider Last Rate Last Admin    sodium chloride 0.9 % bolus 1,000 mL  1,000 mL IntraVENous Once Umer Wade MD        apixaban (ELIQUIS) tablet 5 mg  5 mg Oral BID Maddie Hazel MD   5 mg at 03/24/24 0802    aspirin chewable tablet 81 mg  81 mg Oral Daily Maddie Hazel MD   81 mg at 03/24/24 0802    atorvastatin (LIPITOR) tablet 80 mg  80 mg Oral Daily Maddie Hazel MD   80 mg at 03/23/24 2126    glipiZIDE (GLUCOTROL) tablet 10 mg  10 mg Oral QAM AC Maddie Hazel MD   10 mg at 03/24/24 0802    0.9 % sodium chloride infusion   IntraVENous Continuous Maddie Hazel MD 75 mL/hr at 03/23/24 1523 New Bag at 03/23/24 1523    sodium chloride flush 0.9 % injection 10 mL  10 mL IntraVENous 2 times per day Maddie Hazel MD   10 mL at 03/23/24 2111    sodium chloride flush 0.9 % injection 10 mL  10 mL IntraVENous PRN Maddie Hazel MD        0.9 % sodium chloride infusion   IntraVENous PRN Maddie Hazel MD        potassium chloride (KLOR-CON M) extended release tablet 40 mEq  40 mEq Oral PRN Maddie Hazel MD        Or    potassium bicarb-citric acid (EFFER-K) effervescent tablet 40 mEq  40 mEq Oral PRN Maddie Hazel MD        Or    potassium chloride 10 mEq/100 mL IVPB (Peripheral Line)  10 mEq IntraVENous PRN Maddie Hazel MD        magnesium sulfate 1000 mg in dextrose 5% 100 mL IVPB  1,000 mg IntraVENous PRN Maddie Hazel MD        ondansetron (ZOFRAN-ODT) disintegrating

## 2024-03-24 NOTE — CARE COORDINATION
Case Management Assessment  Initial Evaluation    Date/Time of Evaluation: 3/24/2024 11:51 AM  Assessment Completed by: MILVIA Solomon    If patient is discharged prior to next notation, then this note serves as note for discharge by case management.    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 Yes    Chart Reviewed: Yes      History Provided by: Child/Family  Patient Orientation: Unable to Assess    Patient Cognition: Dementia / Early Alzheimer's    Hospitalization in the last 30 days (Readmission):  No    If yes, Readmission Assessment in  Navigator will be completed.    Advance Directives:      Code Status: Full Code   Patient's Primary Decision Maker is: Legal Next of Kin      Discharge Planning:    Patient lives with: Spouse/Significant Other Type of Home: House  Primary Care Giver: Family  Patient Support Systems include: Children   Current Financial resources: None  Current community resources: ECF/Home Care  Current services prior to admission: Durable Medical Equipment, C-pap            Current DME: Cane, Walker            Type of Home Care services:  OT, PT, Skilled Therapy    ADLS  Prior functional level: Assistance with the following:  Current functional level: Assistance with the following:    PT AM-PAC:   /24  OT AM-PAC:   /24    Family can provide assistance at DC: Yes  Would you like Case Management to discuss the discharge plan with any other family members/significant others, and if so, who?

## 2024-03-25 ENCOUNTER — APPOINTMENT (OUTPATIENT)
Age: 84
DRG: 064 | End: 2024-03-25
Attending: HOSPITALIST
Payer: MEDICARE

## 2024-03-25 PROBLEM — R29.898 RIGHT ARM WEAKNESS: Status: ACTIVE | Noted: 2024-03-25

## 2024-03-25 PROBLEM — R41.89 COGNITIVE IMPAIRMENT: Status: ACTIVE | Noted: 2024-03-23
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
## 2024-03-25 LAB
ANION GAP SERPL CALCULATED.3IONS-SCNC: 9 MMOL/L (ref 9–17)
BASOPHILS # BLD: 0.04 K/UL (ref 0–0.2)
BASOPHILS NFR BLD: 1 % (ref 0–2)
BUN SERPL-MCNC: 25 MG/DL (ref 8–23)
BUN/CREAT SERPL: 28 (ref 9–20)
CALCIUM SERPL-MCNC: 8.6 MG/DL (ref 8.6–10.4)
CHLORIDE SERPL-SCNC: 102 MMOL/L (ref 98–107)
CK SERPL-CCNC: 2713 U/L (ref 39–308)
CO2 SERPL-SCNC: 24 MMOL/L (ref 20–31)
CREAT SERPL-MCNC: 0.9 MG/DL (ref 0.7–1.2)
ECHO AO ROOT DIAM: 3.4 CM
ECHO AO ROOT INDEX: 1.48 CM/M2
ECHO AV PEAK GRADIENT: 9 MMHG
ECHO AV PEAK VELOCITY: 1.5 M/S
ECHO BSA: 2.58 M2
ECHO EST RA PRESSURE: 3 MMHG
ECHO LA AREA 4C: 20.6 CM2
ECHO LA DIAMETER INDEX: 1.88 CM/M2
ECHO LA DIAMETER: 4.3 CM
ECHO LA MAJOR AXIS: 7.1 CM
ECHO LA TO AORTIC ROOT RATIO: 1.26
ECHO LA VOL MOD A4C: 49 ML (ref 18–58)
ECHO LA VOLUME INDEX MOD A4C: 21 ML/M2 (ref 16–34)
ECHO LV E' LATERAL VELOCITY: 8 CM/S
ECHO LV E' SEPTAL VELOCITY: 6 CM/S
ECHO LV FRACTIONAL SHORTENING: 31 % (ref 28–44)
ECHO LV INTERNAL DIMENSION DIASTOLE INDEX: 1.97 CM/M2
ECHO LV INTERNAL DIMENSION DIASTOLIC: 4.5 CM (ref 4.2–5.9)
ECHO LV INTERNAL DIMENSION SYSTOLIC INDEX: 1.35 CM/M2
ECHO LV INTERNAL DIMENSION SYSTOLIC: 3.1 CM
ECHO LV IVSD: 1.4 CM (ref 0.6–1)
ECHO LV MASS 2D: 235.3 G (ref 88–224)
ECHO LV MASS INDEX 2D: 102.8 G/M2 (ref 49–115)
ECHO LV POSTERIOR WALL DIASTOLIC: 1.3 CM (ref 0.6–1)
ECHO LV RELATIVE WALL THICKNESS RATIO: 0.58
ECHO MV A VELOCITY: 0.49 M/S
ECHO MV E DECELERATION TIME (DT): 141 MS
ECHO MV E VELOCITY: 1.15 M/S
ECHO MV E/A RATIO: 2.35
ECHO MV E/E' LATERAL: 14.38
ECHO MV E/E' RATIO (AVERAGED): 16.77
ECHO RIGHT VENTRICULAR SYSTOLIC PRESSURE (RVSP): 23 MMHG
ECHO TV REGURGITANT MAX VELOCITY: 2.24 M/S
ECHO TV REGURGITANT PEAK GRADIENT: 20 MMHG
EOSINOPHIL # BLD: 0.56 K/UL (ref 0–0.44)
EOSINOPHILS RELATIVE PERCENT: 7 % (ref 1–4)
ERYTHROCYTE [DISTWIDTH] IN BLOOD BY AUTOMATED COUNT: 15.3 % (ref 11.8–14.4)
GFR SERPL CREATININE-BSD FRML MDRD: >60 ML/MIN/1.73M2
GLUCOSE BLD-MCNC: 189 MG/DL (ref 75–110)
GLUCOSE BLD-MCNC: 199 MG/DL (ref 75–110)
GLUCOSE BLD-MCNC: 226 MG/DL (ref 75–110)
GLUCOSE BLD-MCNC: 305 MG/DL (ref 75–110)
GLUCOSE SERPL-MCNC: 220 MG/DL (ref 70–99)
HCT VFR BLD AUTO: 40.7 % (ref 40.7–50.3)
HGB BLD-MCNC: 13.6 G/DL (ref 13–17)
IMM GRANULOCYTES # BLD AUTO: 0.03 K/UL (ref 0–0.3)
IMM GRANULOCYTES NFR BLD: 0 %
LYMPHOCYTES NFR BLD: 2.03 K/UL (ref 1.1–3.7)
LYMPHOCYTES RELATIVE PERCENT: 24 % (ref 24–43)
MCH RBC QN AUTO: 27.3 PG (ref 25.2–33.5)
MCHC RBC AUTO-ENTMCNC: 33.4 G/DL (ref 28.4–34.8)
MCV RBC AUTO: 81.6 FL (ref 82.6–102.9)
MONOCYTES NFR BLD: 0.91 K/UL (ref 0.1–1.2)
MONOCYTES NFR BLD: 11 % (ref 3–12)
MYOGLOBIN SERPL-MCNC: 446 NG/ML (ref 28–72)
NEUTROPHILS NFR BLD: 57 % (ref 36–65)
NEUTS SEG NFR BLD: 4.93 K/UL (ref 1.5–8.1)
NRBC BLD-RTO: 0 PER 100 WBC
PLATELET # BLD AUTO: 213 K/UL (ref 138–453)
PMV BLD AUTO: 10.3 FL (ref 8.1–13.5)
POTASSIUM SERPL-SCNC: 3.8 MMOL/L (ref 3.7–5.3)
RBC # BLD AUTO: 4.99 M/UL (ref 4.21–5.77)
RBC # BLD: ABNORMAL 10*6/UL
SODIUM SERPL-SCNC: 135 MMOL/L (ref 135–144)
WBC OTHER # BLD: 8.5 K/UL (ref 3.5–11.3)

## 2024-03-25 PROCEDURE — 82550 ASSAY OF CK (CPK): CPT

## 2024-03-25 PROCEDURE — 2060000000 HC ICU INTERMEDIATE R&B

## 2024-03-25 PROCEDURE — 97535 SELF CARE MNGMENT TRAINING: CPT

## 2024-03-25 PROCEDURE — 94761 N-INVAS EAR/PLS OXIMETRY MLT: CPT

## 2024-03-25 PROCEDURE — 2580000003 HC RX 258: Performed by: HOSPITALIST

## 2024-03-25 PROCEDURE — 97530 THERAPEUTIC ACTIVITIES: CPT

## 2024-03-25 PROCEDURE — 80048 BASIC METABOLIC PNL TOTAL CA: CPT

## 2024-03-25 PROCEDURE — 36415 COLL VENOUS BLD VENIPUNCTURE: CPT

## 2024-03-25 PROCEDURE — 83874 ASSAY OF MYOGLOBIN: CPT

## 2024-03-25 PROCEDURE — 93306 TTE W/DOPPLER COMPLETE: CPT

## 2024-03-25 PROCEDURE — 99231 SBSQ HOSP IP/OBS SF/LOW 25: CPT | Performed by: INTERNAL MEDICINE

## 2024-03-25 PROCEDURE — 85025 COMPLETE CBC W/AUTO DIFF WBC: CPT

## 2024-03-25 PROCEDURE — 6370000000 HC RX 637 (ALT 250 FOR IP): Performed by: HOSPITALIST

## 2024-03-25 PROCEDURE — 99232 SBSQ HOSP IP/OBS MODERATE 35: CPT | Performed by: PSYCHIATRY & NEUROLOGY

## 2024-03-25 PROCEDURE — 6370000000 HC RX 637 (ALT 250 FOR IP): Performed by: FAMILY MEDICINE

## 2024-03-25 PROCEDURE — 82947 ASSAY GLUCOSE BLOOD QUANT: CPT

## 2024-03-25 PROCEDURE — 97110 THERAPEUTIC EXERCISES: CPT

## 2024-03-25 RX ORDER — LANOLIN ALCOHOL/MO/W.PET/CERES
3 CREAM (GRAM) TOPICAL NIGHTLY PRN
Status: DISCONTINUED | OUTPATIENT
Start: 2024-03-25 | End: 2024-04-07 | Stop reason: HOSPADM

## 2024-03-25 RX ORDER — CHLORAL HYDRATE 500 MG
1 CAPSULE ORAL DAILY
COMMUNITY

## 2024-03-25 RX ORDER — DULAGLUTIDE 1.5 MG/.5ML
1.5 INJECTION, SOLUTION SUBCUTANEOUS WEEKLY
COMMUNITY

## 2024-03-25 RX ORDER — OLANZAPINE 5 MG/1
5 TABLET ORAL ONCE
Status: DISCONTINUED | OUTPATIENT
Start: 2024-03-25 | End: 2024-03-26

## 2024-03-25 RX ORDER — LOPERAMIDE HYDROCHLORIDE 2 MG/1
2 CAPSULE ORAL 4 TIMES DAILY PRN
Status: ON HOLD | COMMUNITY
End: 2024-04-01 | Stop reason: HOSPADM

## 2024-03-25 RX ORDER — FUROSEMIDE 20 MG/1
20 TABLET ORAL
COMMUNITY

## 2024-03-25 RX ORDER — M-VIT,TX,IRON,MINS/CALC/FOLIC 27MG-0.4MG
1 TABLET ORAL DAILY
COMMUNITY

## 2024-03-25 RX ORDER — MOMETASONE FUROATE 1 MG/G
CREAM TOPICAL DAILY PRN
COMMUNITY

## 2024-03-25 RX ADMIN — APIXABAN 5 MG: 5 TABLET, FILM COATED ORAL at 21:09

## 2024-03-25 RX ADMIN — ASPIRIN 81 MG CHEWABLE TABLET 81 MG: 81 TABLET CHEWABLE at 09:32

## 2024-03-25 RX ADMIN — INSULIN LISPRO 4 UNITS: 100 INJECTION, SOLUTION INTRAVENOUS; SUBCUTANEOUS at 12:23

## 2024-03-25 RX ADMIN — SODIUM CHLORIDE: 9 INJECTION, SOLUTION INTRAVENOUS at 12:36

## 2024-03-25 RX ADMIN — INSULIN LISPRO 5 UNITS: 100 INJECTION, SOLUTION INTRAVENOUS; SUBCUTANEOUS at 09:32

## 2024-03-25 RX ADMIN — SODIUM CHLORIDE, PRESERVATIVE FREE 10 ML: 5 INJECTION INTRAVENOUS at 21:09

## 2024-03-25 RX ADMIN — INSULIN GLARGINE 10 UNITS: 100 INJECTION, SOLUTION SUBCUTANEOUS at 09:32

## 2024-03-25 RX ADMIN — GLIPIZIDE 10 MG: 10 TABLET ORAL at 09:32

## 2024-03-25 RX ADMIN — INSULIN LISPRO 12 UNITS: 100 INJECTION, SOLUTION INTRAVENOUS; SUBCUTANEOUS at 09:31

## 2024-03-25 RX ADMIN — INSULIN LISPRO 5 UNITS: 100 INJECTION, SOLUTION INTRAVENOUS; SUBCUTANEOUS at 18:28

## 2024-03-25 RX ADMIN — INSULIN LISPRO 5 UNITS: 100 INJECTION, SOLUTION INTRAVENOUS; SUBCUTANEOUS at 12:24

## 2024-03-25 RX ADMIN — Medication 3 MG: at 21:09

## 2024-03-25 RX ADMIN — APIXABAN 5 MG: 5 TABLET, FILM COATED ORAL at 09:32

## 2024-03-25 RX ADMIN — ATORVASTATIN CALCIUM 80 MG: 80 TABLET, FILM COATED ORAL at 21:09

## 2024-03-25 ASSESSMENT — PAIN SCALES - GENERAL
PAINLEVEL_OUTOF10: 0
PAINLEVEL_OUTOF10: 0

## 2024-03-25 NOTE — CARE COORDINATION
Social work: Met with patient and 3 daughters to discuss dc plans, they are all agreeable to snf, choices provided of [facility] for memory care needs.   Referrals sent. Will need precert.     Update 17:25- [facility] is checking on bed availability.  [facility] accept as b/u plan.

## 2024-03-26 ENCOUNTER — APPOINTMENT (OUTPATIENT)
Dept: MRI IMAGING | Age: 84
DRG: 064 | End: 2024-03-26
Payer: MEDICARE

## 2024-03-26 LAB
AMMONIA PLAS-SCNC: 19 UMOL/L (ref 16–60)
ANION GAP SERPL CALCULATED.3IONS-SCNC: 9 MMOL/L (ref 9–17)
BASOPHILS # BLD: 0.05 K/UL (ref 0–0.2)
BASOPHILS NFR BLD: 1 % (ref 0–2)
BUN SERPL-MCNC: 20 MG/DL (ref 8–23)
BUN/CREAT SERPL: 25 (ref 9–20)
CALCIUM SERPL-MCNC: 8.6 MG/DL (ref 8.6–10.4)
CHLORIDE SERPL-SCNC: 103 MMOL/L (ref 98–107)
CK SERPL-CCNC: 1351 U/L (ref 39–308)
CO2 SERPL-SCNC: 26 MMOL/L (ref 20–31)
CREAT SERPL-MCNC: 0.8 MG/DL (ref 0.7–1.2)
EOSINOPHIL # BLD: 0.55 K/UL (ref 0–0.44)
EOSINOPHILS RELATIVE PERCENT: 8 % (ref 1–4)
ERYTHROCYTE [DISTWIDTH] IN BLOOD BY AUTOMATED COUNT: 15.3 % (ref 11.8–14.4)
FOLATE SERPL-MCNC: 7.9 NG/ML (ref 4.8–24.2)
GFR SERPL CREATININE-BSD FRML MDRD: 88 ML/MIN/1.73M2
GLUCOSE BLD-MCNC: 215 MG/DL (ref 75–110)
GLUCOSE BLD-MCNC: 243 MG/DL (ref 75–110)
GLUCOSE BLD-MCNC: 280 MG/DL (ref 75–110)
GLUCOSE SERPL-MCNC: 236 MG/DL (ref 70–99)
HCT VFR BLD AUTO: 41.8 % (ref 40.7–50.3)
HGB BLD-MCNC: 13.7 G/DL (ref 13–17)
IMM GRANULOCYTES # BLD AUTO: 0.01 K/UL (ref 0–0.3)
IMM GRANULOCYTES NFR BLD: 0 %
LYMPHOCYTES NFR BLD: 1.81 K/UL (ref 1.1–3.7)
LYMPHOCYTES RELATIVE PERCENT: 26 % (ref 24–43)
MCH RBC QN AUTO: 27.3 PG (ref 25.2–33.5)
MCHC RBC AUTO-ENTMCNC: 32.8 G/DL (ref 28.4–34.8)
MCV RBC AUTO: 83.4 FL (ref 82.6–102.9)
MONOCYTES NFR BLD: 0.59 K/UL (ref 0.1–1.2)
MONOCYTES NFR BLD: 8 % (ref 3–12)
MYOGLOBIN SERPL-MCNC: 199 NG/ML (ref 28–72)
NEUTROPHILS NFR BLD: 57 % (ref 36–65)
NEUTS SEG NFR BLD: 4.08 K/UL (ref 1.5–8.1)
NRBC BLD-RTO: 0 PER 100 WBC
PLATELET # BLD AUTO: 237 K/UL (ref 138–453)
PMV BLD AUTO: 9.6 FL (ref 8.1–13.5)
POTASSIUM SERPL-SCNC: 3.9 MMOL/L (ref 3.7–5.3)
RBC # BLD AUTO: 5.01 M/UL (ref 4.21–5.77)
RBC # BLD: ABNORMAL 10*6/UL
SODIUM SERPL-SCNC: 138 MMOL/L (ref 135–144)
T PALLIDUM AB SER QL IA: NONREACTIVE
TSH SERPL DL<=0.05 MIU/L-ACNC: 2.5 UIU/ML (ref 0.3–5)
VIT B12 SERPL-MCNC: 743 PG/ML (ref 232–1245)
WBC OTHER # BLD: 7.1 K/UL (ref 3.5–11.3)

## 2024-03-26 PROCEDURE — 82746 ASSAY OF FOLIC ACID SERUM: CPT

## 2024-03-26 PROCEDURE — 99232 SBSQ HOSP IP/OBS MODERATE 35: CPT | Performed by: PSYCHIATRY & NEUROLOGY

## 2024-03-26 PROCEDURE — 83874 ASSAY OF MYOGLOBIN: CPT

## 2024-03-26 PROCEDURE — 86780 TREPONEMA PALLIDUM: CPT

## 2024-03-26 PROCEDURE — 2580000003 HC RX 258: Performed by: HOSPITALIST

## 2024-03-26 PROCEDURE — 82550 ASSAY OF CK (CPK): CPT

## 2024-03-26 PROCEDURE — 2060000000 HC ICU INTERMEDIATE R&B

## 2024-03-26 PROCEDURE — 82947 ASSAY GLUCOSE BLOOD QUANT: CPT

## 2024-03-26 PROCEDURE — 84443 ASSAY THYROID STIM HORMONE: CPT

## 2024-03-26 PROCEDURE — 72141 MRI NECK SPINE W/O DYE: CPT

## 2024-03-26 PROCEDURE — 97530 THERAPEUTIC ACTIVITIES: CPT

## 2024-03-26 PROCEDURE — 36415 COLL VENOUS BLD VENIPUNCTURE: CPT

## 2024-03-26 PROCEDURE — 6370000000 HC RX 637 (ALT 250 FOR IP): Performed by: INTERNAL MEDICINE

## 2024-03-26 PROCEDURE — 97110 THERAPEUTIC EXERCISES: CPT

## 2024-03-26 PROCEDURE — 82140 ASSAY OF AMMONIA: CPT

## 2024-03-26 PROCEDURE — 94761 N-INVAS EAR/PLS OXIMETRY MLT: CPT

## 2024-03-26 PROCEDURE — 80048 BASIC METABOLIC PNL TOTAL CA: CPT

## 2024-03-26 PROCEDURE — 82607 VITAMIN B-12: CPT

## 2024-03-26 PROCEDURE — 97535 SELF CARE MNGMENT TRAINING: CPT

## 2024-03-26 PROCEDURE — 85025 COMPLETE CBC W/AUTO DIFF WBC: CPT

## 2024-03-26 PROCEDURE — 6370000000 HC RX 637 (ALT 250 FOR IP): Performed by: HOSPITALIST

## 2024-03-26 RX ORDER — DONEPEZIL HYDROCHLORIDE 5 MG/1
5 TABLET, FILM COATED ORAL DAILY
Status: DISCONTINUED | OUTPATIENT
Start: 2024-03-26 | End: 2024-03-27

## 2024-03-26 RX ORDER — LOSARTAN POTASSIUM 50 MG/1
50 TABLET ORAL DAILY
Status: DISCONTINUED | OUTPATIENT
Start: 2024-03-26 | End: 2024-03-29

## 2024-03-26 RX ADMIN — ASPIRIN 81 MG CHEWABLE TABLET 81 MG: 81 TABLET CHEWABLE at 09:06

## 2024-03-26 RX ADMIN — INSULIN GLARGINE 10 UNITS: 100 INJECTION, SOLUTION SUBCUTANEOUS at 09:08

## 2024-03-26 RX ADMIN — GLIPIZIDE 10 MG: 10 TABLET ORAL at 09:12

## 2024-03-26 RX ADMIN — INSULIN LISPRO 5 UNITS: 100 INJECTION, SOLUTION INTRAVENOUS; SUBCUTANEOUS at 17:15

## 2024-03-26 RX ADMIN — INSULIN LISPRO 5 UNITS: 100 INJECTION, SOLUTION INTRAVENOUS; SUBCUTANEOUS at 13:25

## 2024-03-26 RX ADMIN — LOSARTAN POTASSIUM 50 MG: 50 TABLET, FILM COATED ORAL at 17:14

## 2024-03-26 RX ADMIN — INSULIN LISPRO 8 UNITS: 100 INJECTION, SOLUTION INTRAVENOUS; SUBCUTANEOUS at 13:24

## 2024-03-26 RX ADMIN — INSULIN LISPRO 4 UNITS: 100 INJECTION, SOLUTION INTRAVENOUS; SUBCUTANEOUS at 09:07

## 2024-03-26 RX ADMIN — SODIUM CHLORIDE, PRESERVATIVE FREE 10 ML: 5 INJECTION INTRAVENOUS at 09:07

## 2024-03-26 RX ADMIN — INSULIN LISPRO 4 UNITS: 100 INJECTION, SOLUTION INTRAVENOUS; SUBCUTANEOUS at 17:14

## 2024-03-26 RX ADMIN — INSULIN LISPRO 5 UNITS: 100 INJECTION, SOLUTION INTRAVENOUS; SUBCUTANEOUS at 09:06

## 2024-03-26 RX ADMIN — APIXABAN 5 MG: 5 TABLET, FILM COATED ORAL at 09:06

## 2024-03-26 ASSESSMENT — PAIN SCALES - GENERAL
PAINLEVEL_OUTOF10: 0

## 2024-03-26 NOTE — FLOWSHEET NOTE
03/25/24 2246   Treatment Team Notification   Reason for Communication Evaluate   Name of Team Member Notified antoinette dyer   Treatment Team Role Advanced Practice Nurse   Method of Communication Secure Message   Response See orders     Pt Is having increased confusion and agitation, tryiing to get pout of bed. Pt has 1:1 placed.     NP order zyprexa.

## 2024-03-26 NOTE — CONSULTS
[facility]  Initial Consult Note  Date: 3/24/2024    Hospital day :1     Impression:   Fall at home with unclear etiology  Leukocytosis-improved  Bibasilar atelectasis and/or small effusions without any focal consolidation  Poorly controlled diabetes mellitus with hemoglobin A1c of 10  Coronary artery disease  Paroxysmal atrial fibrillation  Cognitive impairment/dementia    Recommendations   It is unclear whether the patient has an acute infectious process but the procalcitonin is elevated at 4 and the previously elevated lactic acid is improved  Urinalysis does not show any significant pyuria and COVID testing is so far negative  Blood cultures have been sent and are pending  For now I will continue off systemic antimicrobial therapy as clinically the patient remained stable and is nontoxic-appearing    Chief complaint/reason for consultation:   Possible infection    History of Present Illness:   Keith Oreilly is a 83 y.o.-year-old male who was initially admitted on 3/23/2024.   Keith is seen and evaluated at the bedside and has diabetes mellitus type 2, coronary artery disease, paroxysmal atrial fibrillation, hypertension, morbid obesity, tachybradycardia syndrome, arthritis and cognitive impairment/dementia.    The patient lives at home with his wife and does not drive has been sleeping more recently and it is my understanding that he had a fall and was found on the floor next to the bed in the morning when his wife woke up.  The patient was drowsy and somewhat confused.    The patient currently has right shoulder pain and left lower extremity pain.    The patient had imaging done that showed chronic periventricular changes without an acute cerebral infarction and MRA showed right neck ICA 50 to 60% stenosis.  The patient did have elevated CPK at 4688, white blood cell count of 17.3  CT of the lumbar spine showed severe stenosis L4-L5    Due to the elevated white blood cell count there is 
84 yo male with fall . He has history of dementia along with atrial fibrillation and CAD on eliquis 5 mg po bid and aspirin 81 mg po qd  . Patient's wife this morning on awakening found patient on floor adjacent to bed with her thinking he had been in bed laying on floor or several hours . Patient was drowsy confused with simple verbal response brought to [facility] . Head CT with left hippocampal attenuation possible lacune with bilateral chronic periventricular small vessel ischemia . CPK 2954 , Blood sugar 494 . Hga1c 10.6 , creatinine 1.3 . He was complaining of mild low back pain  . CT lumbar spine with spondylosis with severe stenosis L4-5He has history of atrial fibrillation , CAD , DM , hyperlipidemia  sleep apnea . Cholesterol 148 , LDL 73 , , lactic acid 3.5 , WBC 17.3 k .  Significant medications eliquis 5 mg po bid , aspirin 81 mg po qd  . Testing CT lumbar spine with spondylosis with severe stenosis L4-5. Head CT with left hippocampal attenuation possible lacune with bilateral chronic periventricular small vessel ischemia      Past Medical History:   Diagnosis Date    Arthritis     Atrial fibrillation (HCC)     CAD (coronary artery disease)     no stents    CHF (congestive heart failure) (HCC)     patient denies    Diabetes (HCC)     type 2    Diverticulosis     Hyperlipidemia     Obesity     АННА on CPAP     nightly    Poor historian     Renal stone     passed       Past Surgical History:   Procedure Laterality Date    APPENDECTOMY      BUNIONECTOMY Right 12/3/2020    RIGHT 5TH METHEAD CONDYLECTOMY performed by Hugh Patel MD at [facility] OR    CARDIAC CATHETERIZATION  09/02/2020    : LEFT HEART CATH / LV   CORS    CARDIAC SURGERY      quad bypass    COLONOSCOPY      JOINT REPLACEMENT      patient denies    OTHER SURGICAL HISTORY      tumor removed from rib       Family History   Problem Relation Age of Onset    Other Neg Hx         blood clots       Social History     Socioeconomic 
  Medications  --Trulicity 0.75 MG/0.5 ML Solution Injection once a week  --Glimepiride 4 MG Tablet 2 po qday  --Fenofibrate 145 MG Tablet TAKE ONE TABLET BY MOUTH DAILY Chris:30 Refill: 0  --Eliquis 5 MG Tablet TAKE ONE TABLET BY MOUTH TWICE A DAY Chris:60 Refill: 0  --Lasix 20 MG Tablet Take 1 tablet 2-3 x week as needed Chris:36 Refill: 3  --Lipitor Oral Tablet 80 MG TAKE 1 TABLET BY MOUTH ONE TIME A DAY Chris:30 Refill: 6  --Melatonin 3 MG Tablet 1 po qHS  --Multivitamin Tablet 1 po qday  --Vitamin D3 1000 IU Tablet 1 po qday  --Vitamin B Complex Capsule 1 po qday  --Mucinex 600 MG Tablet, Extended Release PRN  --metFORMIN HCl 500 MG Tablet 2 tabs bid  --Magnesium Oxide 400 MG Tablet PRN  --Fish Oil 1000 MG Capsule, Liquid Filled 1 po qday  --Cranberry 250 MG Capsule 1 po qday  --Aspirin 81 MG Tablet, Enteric Coated take 1 tab po qd     Review of Systems  As per HPI      Physical Examination  General:    morbidly obese, No acute distress, comfortable, alert and oriented. Sitting in a chair  A fib 60s,  158/94, 20  Neck:    Flat, no carotid bruits    Lungs:    Clear to auscultation    Cardio:    No gallop, No murmur, Irregular rate and rhythm, Bradycardic rate and rhythm    Abdomen:    Soft and no hepatojugular reflux    Extremities:    Trivial peripheral edema.      Reviewed Data  EKG  A fib RBBB  Monitor overnight,  intermittent bradycardia without any pauses over 2 seconds.    TSH is normal  K is normal       Impression and Plan    Bradycardia  asymptomatic with no hemodynamic significance.  No significant pauses.   No intervention is needed.  A fib  chronic.  Anticoagulated  with no issues with HR  CAD  stable  no angina  no ischemic EKG changes.  Normal LV function.  Pt is not on b blocker due to bradycardia.  HTN  will start low dose losartan.  Hyperlipidemia  continue current Rx.

## 2024-03-26 NOTE — FLOWSHEET NOTE
03/26/24 0459   Treatment Team Notification   Reason for Communication Evaluate   Name of Team Member Notified isabelPaulina dyer   Treatment Team Role Advanced Practice Nurse   Method of Communication Secure Message   Response Waiting for response     Pt HR went down into 35-40's. Pt last BP was 159/86. Pt is asymptomatic and now sleeping.    New Cardiology consult, notified and aware.

## 2024-03-27 ENCOUNTER — APPOINTMENT (OUTPATIENT)
Dept: CT IMAGING | Age: 84
DRG: 064 | End: 2024-03-27
Payer: MEDICARE

## 2024-03-27 LAB
ANION GAP SERPL CALCULATED.3IONS-SCNC: 9 MMOL/L (ref 9–17)
BASOPHILS # BLD: 0.07 K/UL (ref 0–0.2)
BASOPHILS NFR BLD: 1 % (ref 0–2)
BUN SERPL-MCNC: 17 MG/DL (ref 8–23)
BUN/CREAT SERPL: 21 (ref 9–20)
CALCIUM SERPL-MCNC: 9 MG/DL (ref 8.6–10.4)
CHLORIDE SERPL-SCNC: 102 MMOL/L (ref 98–107)
CK SERPL-CCNC: 710 U/L (ref 39–308)
CO2 SERPL-SCNC: 27 MMOL/L (ref 20–31)
CREAT SERPL-MCNC: 0.8 MG/DL (ref 0.7–1.2)
EOSINOPHIL # BLD: 0.68 K/UL (ref 0–0.44)
EOSINOPHILS RELATIVE PERCENT: 8 % (ref 1–4)
ERYTHROCYTE [DISTWIDTH] IN BLOOD BY AUTOMATED COUNT: 15.3 % (ref 11.8–14.4)
GFR SERPL CREATININE-BSD FRML MDRD: 88 ML/MIN/1.73M2
GLUCOSE BLD-MCNC: 213 MG/DL (ref 75–110)
GLUCOSE BLD-MCNC: 227 MG/DL (ref 75–110)
GLUCOSE BLD-MCNC: 296 MG/DL (ref 75–110)
GLUCOSE BLD-MCNC: 315 MG/DL (ref 75–110)
GLUCOSE SERPL-MCNC: 225 MG/DL (ref 70–99)
HCT VFR BLD AUTO: 43.2 % (ref 40.7–50.3)
HGB BLD-MCNC: 14.4 G/DL (ref 13–17)
IMM GRANULOCYTES # BLD AUTO: 0.09 K/UL (ref 0–0.3)
IMM GRANULOCYTES NFR BLD: 1 %
LYMPHOCYTES NFR BLD: 2.35 K/UL (ref 1.1–3.7)
LYMPHOCYTES RELATIVE PERCENT: 28 % (ref 24–43)
MCH RBC QN AUTO: 28 PG (ref 25.2–33.5)
MCHC RBC AUTO-ENTMCNC: 33.3 G/DL (ref 28.4–34.8)
MCV RBC AUTO: 84 FL (ref 82.6–102.9)
MONOCYTES NFR BLD: 0.8 K/UL (ref 0.1–1.2)
MONOCYTES NFR BLD: 10 % (ref 3–12)
MYOGLOBIN SERPL-MCNC: 190 NG/ML (ref 28–72)
NEUTROPHILS NFR BLD: 52 % (ref 36–65)
NEUTS SEG NFR BLD: 4.42 K/UL (ref 1.5–8.1)
NRBC BLD-RTO: 0 PER 100 WBC
PLATELET # BLD AUTO: 252 K/UL (ref 138–453)
PMV BLD AUTO: 9.3 FL (ref 8.1–13.5)
POTASSIUM SERPL-SCNC: 4 MMOL/L (ref 3.7–5.3)
RBC # BLD AUTO: 5.14 M/UL (ref 4.21–5.77)
RBC # BLD: ABNORMAL 10*6/UL
SODIUM SERPL-SCNC: 138 MMOL/L (ref 135–144)
WBC OTHER # BLD: 8.4 K/UL (ref 3.5–11.3)

## 2024-03-27 PROCEDURE — 82947 ASSAY GLUCOSE BLOOD QUANT: CPT

## 2024-03-27 PROCEDURE — 2580000003 HC RX 258: Performed by: HOSPITALIST

## 2024-03-27 PROCEDURE — 99213 OFFICE O/P EST LOW 20 MIN: CPT

## 2024-03-27 PROCEDURE — 6370000000 HC RX 637 (ALT 250 FOR IP): Performed by: INTERNAL MEDICINE

## 2024-03-27 PROCEDURE — 80048 BASIC METABOLIC PNL TOTAL CA: CPT

## 2024-03-27 PROCEDURE — 2060000000 HC ICU INTERMEDIATE R&B

## 2024-03-27 PROCEDURE — 93005 ELECTROCARDIOGRAM TRACING: CPT | Performed by: PSYCHIATRY & NEUROLOGY

## 2024-03-27 PROCEDURE — 99233 SBSQ HOSP IP/OBS HIGH 50: CPT | Performed by: PSYCHIATRY & NEUROLOGY

## 2024-03-27 PROCEDURE — 36415 COLL VENOUS BLD VENIPUNCTURE: CPT

## 2024-03-27 PROCEDURE — 6370000000 HC RX 637 (ALT 250 FOR IP): Performed by: HOSPITALIST

## 2024-03-27 PROCEDURE — 85025 COMPLETE CBC W/AUTO DIFF WBC: CPT

## 2024-03-27 PROCEDURE — 72125 CT NECK SPINE W/O DYE: CPT

## 2024-03-27 PROCEDURE — 83874 ASSAY OF MYOGLOBIN: CPT

## 2024-03-27 PROCEDURE — 6370000000 HC RX 637 (ALT 250 FOR IP): Performed by: PSYCHIATRY & NEUROLOGY

## 2024-03-27 PROCEDURE — 82550 ASSAY OF CK (CPK): CPT

## 2024-03-27 PROCEDURE — 6370000000 HC RX 637 (ALT 250 FOR IP): Performed by: FAMILY MEDICINE

## 2024-03-27 RX ORDER — QUETIAPINE FUMARATE 25 MG/1
25 TABLET, FILM COATED ORAL NIGHTLY
Status: DISCONTINUED | OUTPATIENT
Start: 2024-03-27 | End: 2024-04-07 | Stop reason: HOSPADM

## 2024-03-27 RX ORDER — MEMANTINE HYDROCHLORIDE 10 MG/1
5 TABLET ORAL DAILY
Status: DISCONTINUED | OUTPATIENT
Start: 2024-03-27 | End: 2024-04-07 | Stop reason: HOSPADM

## 2024-03-27 RX ADMIN — LOSARTAN POTASSIUM 50 MG: 50 TABLET, FILM COATED ORAL at 10:03

## 2024-03-27 RX ADMIN — INSULIN LISPRO 12 UNITS: 100 INJECTION, SOLUTION INTRAVENOUS; SUBCUTANEOUS at 17:02

## 2024-03-27 RX ADMIN — APIXABAN 5 MG: 5 TABLET, FILM COATED ORAL at 10:03

## 2024-03-27 RX ADMIN — INSULIN LISPRO 5 UNITS: 100 INJECTION, SOLUTION INTRAVENOUS; SUBCUTANEOUS at 12:26

## 2024-03-27 RX ADMIN — QUETIAPINE FUMARATE 25 MG: 25 TABLET ORAL at 20:21

## 2024-03-27 RX ADMIN — INSULIN LISPRO 5 UNITS: 100 INJECTION, SOLUTION INTRAVENOUS; SUBCUTANEOUS at 17:02

## 2024-03-27 RX ADMIN — DONEPEZIL HYDROCHLORIDE 5 MG: 5 TABLET, FILM COATED ORAL at 00:49

## 2024-03-27 RX ADMIN — SODIUM CHLORIDE, PRESERVATIVE FREE 10 ML: 5 INJECTION INTRAVENOUS at 21:00

## 2024-03-27 RX ADMIN — Medication 3 MG: at 20:21

## 2024-03-27 RX ADMIN — ASPIRIN 81 MG CHEWABLE TABLET 81 MG: 81 TABLET CHEWABLE at 10:03

## 2024-03-27 RX ADMIN — SODIUM CHLORIDE, PRESERVATIVE FREE 10 ML: 5 INJECTION INTRAVENOUS at 10:04

## 2024-03-27 RX ADMIN — APIXABAN 5 MG: 5 TABLET, FILM COATED ORAL at 00:49

## 2024-03-27 RX ADMIN — MEMANTINE 5 MG: 10 TABLET ORAL at 12:26

## 2024-03-27 RX ADMIN — INSULIN LISPRO 5 UNITS: 100 INJECTION, SOLUTION INTRAVENOUS; SUBCUTANEOUS at 10:03

## 2024-03-27 RX ADMIN — INSULIN LISPRO 8 UNITS: 100 INJECTION, SOLUTION INTRAVENOUS; SUBCUTANEOUS at 10:03

## 2024-03-27 RX ADMIN — GLIPIZIDE 10 MG: 10 TABLET ORAL at 05:39

## 2024-03-27 RX ADMIN — INSULIN LISPRO 4 UNITS: 100 INJECTION, SOLUTION INTRAVENOUS; SUBCUTANEOUS at 12:26

## 2024-03-27 RX ADMIN — INSULIN GLARGINE 10 UNITS: 100 INJECTION, SOLUTION SUBCUTANEOUS at 10:03

## 2024-03-27 RX ADMIN — APIXABAN 5 MG: 5 TABLET, FILM COATED ORAL at 20:21

## 2024-03-27 RX ADMIN — ATORVASTATIN CALCIUM 80 MG: 80 TABLET, FILM COATED ORAL at 20:21

## 2024-03-27 RX ADMIN — ATORVASTATIN CALCIUM 80 MG: 80 TABLET, FILM COATED ORAL at 00:49

## 2024-03-27 ASSESSMENT — PAIN SCALES - GENERAL: PAINLEVEL_OUTOF10: 0

## 2024-03-27 NOTE — DISCHARGE INSTR - COC
Continuity of Care Form    Patient Name: Keith Oreilly   :  1940  MRN:  [id number]    Admit date:  3/23/2024  Discharge date:  24    Code Status Order: Full Code   Advance Directives:     Admitting Physician:  Maddie Hazel MD  PCP: Chaparrita Barrios MD    Discharging Nurse: Reddy DALLAS  Discharging Hospital Unit/Room#: /-  Discharging Unit Phone Number: [phone]    Emergency Contact:   Extended Emergency Contact Information  Primary Emergency Contact: Karlie Oreilly  Address: [address] [place] [address]  Home Phone: [phone]  Relation: Spouse  Secondary Emergency Contact: Vishal Oreilly  Mobile Phone: [phone]  Relation: Child  Preferred language: English   needed? No    Past Surgical History:  Past Surgical History:   Procedure Laterality Date    APPENDECTOMY      BUNIONECTOMY Right 12/3/2020    RIGHT 5TH METHEAD CONDYLECTOMY performed by Hugh [facility]el MD at [facility] OR    CARDIAC CATHETERIZATION  2020    : LEFT HEART CATH / LV   CORS    CARDIAC SURGERY      quad bypass    COLONOSCOPY      JOINT REPLACEMENT      patient denies    OTHER SURGICAL HISTORY      tumor removed from rib       Immunization History:   Immunization History   Administered Date(s) Administered    COVID-19, MODERNA BLUE border, Primary or Immunocompromised, (age 12y+), IM, 100 mcg/0.5mL 2021, 07/15/2022       Active Problems:  Patient Active Problem List   Diagnosis Code    Dupuytren's contracture of left hand M72.0    Tailor's bunion of right foot M21.621    CVA (cerebrovascular accident due to intracerebral hemorrhage) ([facility]) I61.9    Encephalopathy G93.40    Cognitive impairment R41.89    Leukocytosis D72.829    Lacunar infarction ([facility]) I63.81    Fall W19.XXXA    Right arm weakness R29.898       Isolation/Infection:   Isolation            No Isolation          Patient Infection Status       None to display                     Nurse Assessment:  Last Vital Signs: /72

## 2024-03-28 LAB
ANION GAP SERPL CALCULATED.3IONS-SCNC: 10 MMOL/L (ref 9–17)
BASOPHILS # BLD: 0.09 K/UL (ref 0–0.2)
BASOPHILS NFR BLD: 1 % (ref 0–2)
BUN SERPL-MCNC: 17 MG/DL (ref 8–23)
BUN/CREAT SERPL: 21 (ref 9–20)
CALCIUM SERPL-MCNC: 8.8 MG/DL (ref 8.6–10.4)
CHLORIDE SERPL-SCNC: 102 MMOL/L (ref 98–107)
CK SERPL-CCNC: 475 U/L (ref 39–308)
CO2 SERPL-SCNC: 25 MMOL/L (ref 20–31)
CREAT SERPL-MCNC: 0.8 MG/DL (ref 0.7–1.2)
EOSINOPHIL # BLD: 0.76 K/UL (ref 0–0.44)
EOSINOPHILS RELATIVE PERCENT: 8 % (ref 1–4)
ERYTHROCYTE [DISTWIDTH] IN BLOOD BY AUTOMATED COUNT: 15.5 % (ref 11.8–14.4)
GFR SERPL CREATININE-BSD FRML MDRD: 88 ML/MIN/1.73M2
GLUCOSE BLD-MCNC: 228 MG/DL (ref 75–110)
GLUCOSE BLD-MCNC: 273 MG/DL (ref 75–110)
GLUCOSE BLD-MCNC: 287 MG/DL (ref 75–110)
GLUCOSE BLD-MCNC: 287 MG/DL (ref 75–110)
GLUCOSE BLD-MCNC: 467 MG/DL (ref 75–110)
GLUCOSE SERPL-MCNC: 190 MG/DL (ref 70–99)
HCT VFR BLD AUTO: 43.2 % (ref 40.7–50.3)
HGB BLD-MCNC: 13.8 G/DL (ref 13–17)
IMM GRANULOCYTES # BLD AUTO: 0.11 K/UL (ref 0–0.3)
IMM GRANULOCYTES NFR BLD: 1 %
LYMPHOCYTES NFR BLD: 2.33 K/UL (ref 1.1–3.7)
LYMPHOCYTES RELATIVE PERCENT: 25 % (ref 24–43)
MCH RBC QN AUTO: 27.8 PG (ref 25.2–33.5)
MCHC RBC AUTO-ENTMCNC: 31.9 G/DL (ref 28.4–34.8)
MCV RBC AUTO: 86.9 FL (ref 82.6–102.9)
MICROORGANISM SPEC CULT: NORMAL
MICROORGANISM SPEC CULT: NORMAL
MONOCYTES NFR BLD: 0.84 K/UL (ref 0.1–1.2)
MONOCYTES NFR BLD: 9 % (ref 3–12)
MYOGLOBIN SERPL-MCNC: 235 NG/ML (ref 28–72)
NEUTROPHILS NFR BLD: 56 % (ref 36–65)
NEUTS SEG NFR BLD: 5.18 K/UL (ref 1.5–8.1)
NRBC BLD-RTO: 0 PER 100 WBC
PLATELET # BLD AUTO: 256 K/UL (ref 138–453)
PMV BLD AUTO: 9.6 FL (ref 8.1–13.5)
POTASSIUM SERPL-SCNC: 4 MMOL/L (ref 3.7–5.3)
RBC # BLD AUTO: 4.97 M/UL (ref 4.21–5.77)
RBC # BLD: ABNORMAL 10*6/UL
SERVICE CMNT-IMP: NORMAL
SERVICE CMNT-IMP: NORMAL
SODIUM SERPL-SCNC: 137 MMOL/L (ref 135–144)
SPECIMEN DESCRIPTION: NORMAL
SPECIMEN DESCRIPTION: NORMAL
WBC OTHER # BLD: 9.3 K/UL (ref 3.5–11.3)

## 2024-03-28 PROCEDURE — 80048 BASIC METABOLIC PNL TOTAL CA: CPT

## 2024-03-28 PROCEDURE — 85025 COMPLETE CBC W/AUTO DIFF WBC: CPT

## 2024-03-28 PROCEDURE — 36415 COLL VENOUS BLD VENIPUNCTURE: CPT

## 2024-03-28 PROCEDURE — 83874 ASSAY OF MYOGLOBIN: CPT

## 2024-03-28 PROCEDURE — 97116 GAIT TRAINING THERAPY: CPT

## 2024-03-28 PROCEDURE — 97110 THERAPEUTIC EXERCISES: CPT

## 2024-03-28 PROCEDURE — 6370000000 HC RX 637 (ALT 250 FOR IP): Performed by: HOSPITALIST

## 2024-03-28 PROCEDURE — 6370000000 HC RX 637 (ALT 250 FOR IP): Performed by: PSYCHIATRY & NEUROLOGY

## 2024-03-28 PROCEDURE — 97530 THERAPEUTIC ACTIVITIES: CPT

## 2024-03-28 PROCEDURE — 82947 ASSAY GLUCOSE BLOOD QUANT: CPT

## 2024-03-28 PROCEDURE — 99232 SBSQ HOSP IP/OBS MODERATE 35: CPT | Performed by: PSYCHIATRY & NEUROLOGY

## 2024-03-28 PROCEDURE — 6370000000 HC RX 637 (ALT 250 FOR IP): Performed by: INTERNAL MEDICINE

## 2024-03-28 PROCEDURE — 6370000000 HC RX 637 (ALT 250 FOR IP): Performed by: FAMILY MEDICINE

## 2024-03-28 PROCEDURE — 2580000003 HC RX 258: Performed by: HOSPITALIST

## 2024-03-28 PROCEDURE — 2060000000 HC ICU INTERMEDIATE R&B

## 2024-03-28 PROCEDURE — 82550 ASSAY OF CK (CPK): CPT

## 2024-03-28 RX ADMIN — SODIUM CHLORIDE, PRESERVATIVE FREE 10 ML: 5 INJECTION INTRAVENOUS at 20:38

## 2024-03-28 RX ADMIN — INSULIN LISPRO 4 UNITS: 100 INJECTION, SOLUTION INTRAVENOUS; SUBCUTANEOUS at 10:07

## 2024-03-28 RX ADMIN — APIXABAN 5 MG: 5 TABLET, FILM COATED ORAL at 20:32

## 2024-03-28 RX ADMIN — INSULIN LISPRO 5 UNITS: 100 INJECTION, SOLUTION INTRAVENOUS; SUBCUTANEOUS at 13:06

## 2024-03-28 RX ADMIN — Medication 3 MG: at 20:38

## 2024-03-28 RX ADMIN — QUETIAPINE FUMARATE 25 MG: 25 TABLET ORAL at 20:32

## 2024-03-28 RX ADMIN — MEMANTINE 5 MG: 10 TABLET ORAL at 10:08

## 2024-03-28 RX ADMIN — APIXABAN 5 MG: 5 TABLET, FILM COATED ORAL at 10:08

## 2024-03-28 RX ADMIN — INSULIN LISPRO 5 UNITS: 100 INJECTION, SOLUTION INTRAVENOUS; SUBCUTANEOUS at 10:07

## 2024-03-28 RX ADMIN — INSULIN LISPRO 8 UNITS: 100 INJECTION, SOLUTION INTRAVENOUS; SUBCUTANEOUS at 16:37

## 2024-03-28 RX ADMIN — INSULIN LISPRO 5 UNITS: 100 INJECTION, SOLUTION INTRAVENOUS; SUBCUTANEOUS at 16:37

## 2024-03-28 RX ADMIN — LOSARTAN POTASSIUM 50 MG: 50 TABLET, FILM COATED ORAL at 10:08

## 2024-03-28 RX ADMIN — SODIUM CHLORIDE, PRESERVATIVE FREE 10 ML: 5 INJECTION INTRAVENOUS at 10:10

## 2024-03-28 RX ADMIN — ATORVASTATIN CALCIUM 80 MG: 80 TABLET, FILM COATED ORAL at 20:32

## 2024-03-28 RX ADMIN — INSULIN GLARGINE 10 UNITS: 100 INJECTION, SOLUTION SUBCUTANEOUS at 10:07

## 2024-03-28 RX ADMIN — ASPIRIN 81 MG CHEWABLE TABLET 81 MG: 81 TABLET CHEWABLE at 10:08

## 2024-03-28 RX ADMIN — INSULIN LISPRO 8 UNITS: 100 INJECTION, SOLUTION INTRAVENOUS; SUBCUTANEOUS at 13:06

## 2024-03-28 ASSESSMENT — PAIN SCALES - GENERAL
PAINLEVEL_OUTOF10: 0
PAINLEVEL_OUTOF10: 0

## 2024-03-28 NOTE — FLOWSHEET NOTE
03/28/24 1900   Family/Significant Other Communication   Reason Update   Name Vishal Ziegler   Relationship Child   Response Wants Pt to go to rehab   Method of Communication In Person     Patient's spouse and daughter at bedside. Daughter reiterated that she wants Patient to go to a rehab facility versus going home. She stated Patient is able to walk, but no one will be available to help him if there is an issue. Her mother has a history of falls herself. Daughter and other siblings live 20 minutes away.

## 2024-03-29 LAB
ANION GAP SERPL CALCULATED.3IONS-SCNC: 7 MMOL/L (ref 9–17)
BASOPHILS # BLD: 0.11 K/UL (ref 0–0.2)
BASOPHILS NFR BLD: 1 % (ref 0–2)
BUN SERPL-MCNC: 19 MG/DL (ref 8–23)
BUN/CREAT SERPL: 21 (ref 9–20)
CALCIUM SERPL-MCNC: 8.9 MG/DL (ref 8.6–10.4)
CHLORIDE SERPL-SCNC: 104 MMOL/L (ref 98–107)
CK SERPL-CCNC: 211 U/L (ref 39–308)
CO2 SERPL-SCNC: 26 MMOL/L (ref 20–31)
CREAT SERPL-MCNC: 0.9 MG/DL (ref 0.7–1.2)
EKG Q-T INTERVAL: 448 MS
EKG QRS DURATION: 162 MS
EKG QTC CALCULATION (BAZETT): 462 MS
EKG R AXIS: 56 DEGREES
EKG T AXIS: -53 DEGREES
EKG VENTRICULAR RATE: 64 BPM
EOSINOPHIL # BLD: 0.73 K/UL (ref 0–0.44)
EOSINOPHILS RELATIVE PERCENT: 8 % (ref 1–4)
ERYTHROCYTE [DISTWIDTH] IN BLOOD BY AUTOMATED COUNT: 15.6 % (ref 11.8–14.4)
GFR SERPL CREATININE-BSD FRML MDRD: 85 ML/MIN/1.73M2
GLUCOSE BLD-MCNC: 185 MG/DL (ref 75–110)
GLUCOSE BLD-MCNC: 240 MG/DL (ref 75–110)
GLUCOSE BLD-MCNC: 251 MG/DL (ref 75–110)
GLUCOSE BLD-MCNC: 278 MG/DL (ref 75–110)
GLUCOSE SERPL-MCNC: 247 MG/DL (ref 70–99)
HCT VFR BLD AUTO: 40.7 % (ref 40.7–50.3)
HGB BLD-MCNC: 13 G/DL (ref 13–17)
IMM GRANULOCYTES # BLD AUTO: 0.1 K/UL (ref 0–0.3)
IMM GRANULOCYTES NFR BLD: 1 %
LYMPHOCYTES NFR BLD: 1.8 K/UL (ref 1.1–3.7)
LYMPHOCYTES RELATIVE PERCENT: 20 % (ref 24–43)
MCH RBC QN AUTO: 27.1 PG (ref 25.2–33.5)
MCHC RBC AUTO-ENTMCNC: 31.9 G/DL (ref 28.4–34.8)
MCV RBC AUTO: 85 FL (ref 82.6–102.9)
MONOCYTES NFR BLD: 0.95 K/UL (ref 0.1–1.2)
MONOCYTES NFR BLD: 10 % (ref 3–12)
MYOGLOBIN SERPL-MCNC: 120 NG/ML (ref 28–72)
NEUTROPHILS NFR BLD: 60 % (ref 36–65)
NEUTS SEG NFR BLD: 5.42 K/UL (ref 1.5–8.1)
NRBC BLD-RTO: 0 PER 100 WBC
PLATELET # BLD AUTO: 271 K/UL (ref 138–453)
PMV BLD AUTO: 9.1 FL (ref 8.1–13.5)
POTASSIUM SERPL-SCNC: 4.3 MMOL/L (ref 3.7–5.3)
RBC # BLD AUTO: 4.79 M/UL (ref 4.21–5.77)
RBC # BLD: ABNORMAL 10*6/UL
SODIUM SERPL-SCNC: 137 MMOL/L (ref 135–144)
WBC OTHER # BLD: 9.1 K/UL (ref 3.5–11.3)

## 2024-03-29 PROCEDURE — 6370000000 HC RX 637 (ALT 250 FOR IP): Performed by: PSYCHIATRY & NEUROLOGY

## 2024-03-29 PROCEDURE — 80048 BASIC METABOLIC PNL TOTAL CA: CPT

## 2024-03-29 PROCEDURE — 2060000000 HC ICU INTERMEDIATE R&B

## 2024-03-29 PROCEDURE — 83874 ASSAY OF MYOGLOBIN: CPT

## 2024-03-29 PROCEDURE — 82947 ASSAY GLUCOSE BLOOD QUANT: CPT

## 2024-03-29 PROCEDURE — 36415 COLL VENOUS BLD VENIPUNCTURE: CPT

## 2024-03-29 PROCEDURE — 97116 GAIT TRAINING THERAPY: CPT

## 2024-03-29 PROCEDURE — 82550 ASSAY OF CK (CPK): CPT

## 2024-03-29 PROCEDURE — 6370000000 HC RX 637 (ALT 250 FOR IP): Performed by: HOSPITALIST

## 2024-03-29 PROCEDURE — 85025 COMPLETE CBC W/AUTO DIFF WBC: CPT

## 2024-03-29 PROCEDURE — 97535 SELF CARE MNGMENT TRAINING: CPT

## 2024-03-29 PROCEDURE — 97110 THERAPEUTIC EXERCISES: CPT

## 2024-03-29 PROCEDURE — 6370000000 HC RX 637 (ALT 250 FOR IP): Performed by: INTERNAL MEDICINE

## 2024-03-29 PROCEDURE — 2580000003 HC RX 258: Performed by: HOSPITALIST

## 2024-03-29 PROCEDURE — 97530 THERAPEUTIC ACTIVITIES: CPT

## 2024-03-29 RX ORDER — QUETIAPINE FUMARATE 25 MG/1
25 TABLET, FILM COATED ORAL NIGHTLY
Qty: 60 TABLET | Refills: 1 | Status: SHIPPED | OUTPATIENT
Start: 2024-03-29

## 2024-03-29 RX ORDER — LOSARTAN POTASSIUM 100 MG/1
100 TABLET ORAL DAILY
Status: DISCONTINUED | OUTPATIENT
Start: 2024-03-30 | End: 2024-04-07 | Stop reason: HOSPADM

## 2024-03-29 RX ORDER — MEMANTINE HYDROCHLORIDE 5 MG/1
5 TABLET ORAL DAILY
Qty: 60 TABLET | Refills: 1 | Status: SHIPPED | OUTPATIENT
Start: 2024-03-30

## 2024-03-29 RX ORDER — LOSARTAN POTASSIUM 50 MG/1
50 TABLET ORAL ONCE
Status: COMPLETED | OUTPATIENT
Start: 2024-03-29 | End: 2024-03-29

## 2024-03-29 RX ORDER — VALSARTAN 160 MG/1
160 TABLET ORAL DAILY
Qty: 30 TABLET | Refills: 1 | Status: SHIPPED | OUTPATIENT
Start: 2024-03-29 | End: 2024-04-01 | Stop reason: HOSPADM

## 2024-03-29 RX ADMIN — LOSARTAN POTASSIUM 50 MG: 50 TABLET, FILM COATED ORAL at 09:40

## 2024-03-29 RX ADMIN — INSULIN LISPRO 5 UNITS: 100 INJECTION, SOLUTION INTRAVENOUS; SUBCUTANEOUS at 13:07

## 2024-03-29 RX ADMIN — ATORVASTATIN CALCIUM 80 MG: 80 TABLET, FILM COATED ORAL at 21:47

## 2024-03-29 RX ADMIN — INSULIN LISPRO 8 UNITS: 100 INJECTION, SOLUTION INTRAVENOUS; SUBCUTANEOUS at 09:40

## 2024-03-29 RX ADMIN — GLIPIZIDE 10 MG: 10 TABLET ORAL at 09:39

## 2024-03-29 RX ADMIN — SODIUM CHLORIDE, PRESERVATIVE FREE 10 ML: 5 INJECTION INTRAVENOUS at 09:41

## 2024-03-29 RX ADMIN — INSULIN GLARGINE 10 UNITS: 100 INJECTION, SOLUTION SUBCUTANEOUS at 09:40

## 2024-03-29 RX ADMIN — SODIUM CHLORIDE, PRESERVATIVE FREE 10 ML: 5 INJECTION INTRAVENOUS at 21:47

## 2024-03-29 RX ADMIN — LOSARTAN POTASSIUM 50 MG: 50 TABLET, FILM COATED ORAL at 15:07

## 2024-03-29 RX ADMIN — APIXABAN 5 MG: 5 TABLET, FILM COATED ORAL at 09:39

## 2024-03-29 RX ADMIN — MEMANTINE 5 MG: 10 TABLET ORAL at 09:41

## 2024-03-29 RX ADMIN — INSULIN LISPRO 8 UNITS: 100 INJECTION, SOLUTION INTRAVENOUS; SUBCUTANEOUS at 13:07

## 2024-03-29 RX ADMIN — ASPIRIN 81 MG CHEWABLE TABLET 81 MG: 81 TABLET CHEWABLE at 09:40

## 2024-03-29 RX ADMIN — QUETIAPINE FUMARATE 25 MG: 25 TABLET ORAL at 21:47

## 2024-03-29 RX ADMIN — INSULIN LISPRO 5 UNITS: 100 INJECTION, SOLUTION INTRAVENOUS; SUBCUTANEOUS at 09:40

## 2024-03-29 RX ADMIN — INSULIN LISPRO 5 UNITS: 100 INJECTION, SOLUTION INTRAVENOUS; SUBCUTANEOUS at 17:03

## 2024-03-29 RX ADMIN — APIXABAN 5 MG: 5 TABLET, FILM COATED ORAL at 21:47

## 2024-03-29 ASSESSMENT — PAIN SCALES - GENERAL: PAINLEVEL_OUTOF10: 0

## 2024-03-29 NOTE — CARE COORDINATION
Discharge planning.     [facility] denied. Referral made to [facility] and [facility] Speech ordered via PT/OT. [facility] accepted in case patient goes home.

## 2024-03-29 NOTE — CARE COORDINATION
Social Work-[facility] is out of network with [employer]. Discussed other options with daughter. She would like a referral sent to [facility]. Sent referral. Tammy

## 2024-03-29 NOTE — CARE COORDINATION
Social Work-[place] [facility] denied patient. Discussed with jose manuel. She states that they most likely will take patient home. She would liek a referral to [facility]. Sent referral. Tammy

## 2024-03-30 LAB
ANION GAP SERPL CALCULATED.3IONS-SCNC: 10 MMOL/L (ref 9–17)
BASOPHILS # BLD: 0.08 K/UL (ref 0–0.2)
BASOPHILS NFR BLD: 1 % (ref 0–2)
BUN SERPL-MCNC: 21 MG/DL (ref 8–23)
BUN/CREAT SERPL: 23 (ref 9–20)
CALCIUM SERPL-MCNC: 9.1 MG/DL (ref 8.6–10.4)
CHLORIDE SERPL-SCNC: 101 MMOL/L (ref 98–107)
CK SERPL-CCNC: 166 U/L (ref 39–308)
CO2 SERPL-SCNC: 25 MMOL/L (ref 20–31)
CREAT SERPL-MCNC: 0.9 MG/DL (ref 0.7–1.2)
EOSINOPHIL # BLD: 0.77 K/UL (ref 0–0.44)
EOSINOPHILS RELATIVE PERCENT: 9 % (ref 1–4)
ERYTHROCYTE [DISTWIDTH] IN BLOOD BY AUTOMATED COUNT: 15.4 % (ref 11.8–14.4)
GFR SERPL CREATININE-BSD FRML MDRD: 85 ML/MIN/1.73M2
GLUCOSE BLD-MCNC: 177 MG/DL (ref 75–110)
GLUCOSE BLD-MCNC: 194 MG/DL (ref 75–110)
GLUCOSE BLD-MCNC: 227 MG/DL (ref 75–110)
GLUCOSE BLD-MCNC: 254 MG/DL (ref 75–110)
GLUCOSE SERPL-MCNC: 242 MG/DL (ref 70–99)
HCT VFR BLD AUTO: 40.7 % (ref 40.7–50.3)
HGB BLD-MCNC: 13.5 G/DL (ref 13–17)
IMM GRANULOCYTES # BLD AUTO: 0.1 K/UL (ref 0–0.3)
IMM GRANULOCYTES NFR BLD: 1 %
LYMPHOCYTES NFR BLD: 2.27 K/UL (ref 1.1–3.7)
LYMPHOCYTES RELATIVE PERCENT: 26 % (ref 24–43)
MCH RBC QN AUTO: 27.8 PG (ref 25.2–33.5)
MCHC RBC AUTO-ENTMCNC: 33.2 G/DL (ref 28.4–34.8)
MCV RBC AUTO: 83.7 FL (ref 82.6–102.9)
MONOCYTES NFR BLD: 0.99 K/UL (ref 0.1–1.2)
MONOCYTES NFR BLD: 11 % (ref 3–12)
MYOGLOBIN SERPL-MCNC: 135 NG/ML (ref 28–72)
NEUTROPHILS NFR BLD: 52 % (ref 36–65)
NEUTS SEG NFR BLD: 4.49 K/UL (ref 1.5–8.1)
NRBC BLD-RTO: 0 PER 100 WBC
PLATELET # BLD AUTO: 300 K/UL (ref 138–453)
PMV BLD AUTO: 9.4 FL (ref 8.1–13.5)
POTASSIUM SERPL-SCNC: 4.3 MMOL/L (ref 3.7–5.3)
RBC # BLD AUTO: 4.86 M/UL (ref 4.21–5.77)
RBC # BLD: ABNORMAL 10*6/UL
SODIUM SERPL-SCNC: 136 MMOL/L (ref 135–144)
WBC OTHER # BLD: 8.7 K/UL (ref 3.5–11.3)

## 2024-03-30 PROCEDURE — 6370000000 HC RX 637 (ALT 250 FOR IP): Performed by: HOSPITALIST

## 2024-03-30 PROCEDURE — 80048 BASIC METABOLIC PNL TOTAL CA: CPT

## 2024-03-30 PROCEDURE — 82550 ASSAY OF CK (CPK): CPT

## 2024-03-30 PROCEDURE — 83874 ASSAY OF MYOGLOBIN: CPT

## 2024-03-30 PROCEDURE — 85025 COMPLETE CBC W/AUTO DIFF WBC: CPT

## 2024-03-30 PROCEDURE — 6370000000 HC RX 637 (ALT 250 FOR IP): Performed by: INTERNAL MEDICINE

## 2024-03-30 PROCEDURE — 97110 THERAPEUTIC EXERCISES: CPT

## 2024-03-30 PROCEDURE — 92523 SPEECH SOUND LANG COMPREHEN: CPT

## 2024-03-30 PROCEDURE — 36415 COLL VENOUS BLD VENIPUNCTURE: CPT

## 2024-03-30 PROCEDURE — 6370000000 HC RX 637 (ALT 250 FOR IP): Performed by: PSYCHIATRY & NEUROLOGY

## 2024-03-30 PROCEDURE — 2580000003 HC RX 258: Performed by: HOSPITALIST

## 2024-03-30 PROCEDURE — 2060000000 HC ICU INTERMEDIATE R&B

## 2024-03-30 PROCEDURE — 6370000000 HC RX 637 (ALT 250 FOR IP): Performed by: FAMILY MEDICINE

## 2024-03-30 PROCEDURE — 82947 ASSAY GLUCOSE BLOOD QUANT: CPT

## 2024-03-30 RX ADMIN — INSULIN LISPRO 4 UNITS: 100 INJECTION, SOLUTION INTRAVENOUS; SUBCUTANEOUS at 09:14

## 2024-03-30 RX ADMIN — MEMANTINE 5 MG: 10 TABLET ORAL at 08:01

## 2024-03-30 RX ADMIN — APIXABAN 5 MG: 5 TABLET, FILM COATED ORAL at 08:01

## 2024-03-30 RX ADMIN — Medication 3 MG: at 21:28

## 2024-03-30 RX ADMIN — LOSARTAN POTASSIUM 100 MG: 100 TABLET, FILM COATED ORAL at 08:01

## 2024-03-30 RX ADMIN — INSULIN GLARGINE 10 UNITS: 100 INJECTION, SOLUTION SUBCUTANEOUS at 09:15

## 2024-03-30 RX ADMIN — Medication 3 MG: at 01:45

## 2024-03-30 RX ADMIN — INSULIN LISPRO 5 UNITS: 100 INJECTION, SOLUTION INTRAVENOUS; SUBCUTANEOUS at 18:00

## 2024-03-30 RX ADMIN — INSULIN LISPRO 5 UNITS: 100 INJECTION, SOLUTION INTRAVENOUS; SUBCUTANEOUS at 13:14

## 2024-03-30 RX ADMIN — SODIUM CHLORIDE, PRESERVATIVE FREE 10 ML: 5 INJECTION INTRAVENOUS at 08:03

## 2024-03-30 RX ADMIN — ATORVASTATIN CALCIUM 80 MG: 80 TABLET, FILM COATED ORAL at 21:28

## 2024-03-30 RX ADMIN — QUETIAPINE FUMARATE 25 MG: 25 TABLET ORAL at 21:28

## 2024-03-30 RX ADMIN — INSULIN LISPRO 8 UNITS: 100 INJECTION, SOLUTION INTRAVENOUS; SUBCUTANEOUS at 13:14

## 2024-03-30 RX ADMIN — ASPIRIN 81 MG CHEWABLE TABLET 81 MG: 81 TABLET CHEWABLE at 08:01

## 2024-03-30 RX ADMIN — APIXABAN 5 MG: 5 TABLET, FILM COATED ORAL at 21:28

## 2024-03-30 RX ADMIN — GLIPIZIDE 10 MG: 10 TABLET ORAL at 08:01

## 2024-03-30 RX ADMIN — INSULIN LISPRO 5 UNITS: 100 INJECTION, SOLUTION INTRAVENOUS; SUBCUTANEOUS at 09:15

## 2024-03-30 ASSESSMENT — PAIN SCALES - GENERAL
PAINLEVEL_OUTOF10: 0

## 2024-03-30 NOTE — CARE COORDINATION
Social work:updated [facility] they are working on precert.   Will need alexander at discharge if ok with insurance for ARU at [facility].  Report [phone]  Fax [phone]  Akua meraz

## 2024-03-31 LAB
ANION GAP SERPL CALCULATED.3IONS-SCNC: 11 MMOL/L (ref 9–17)
BASOPHILS # BLD: 0.07 K/UL (ref 0–0.2)
BASOPHILS NFR BLD: 1 % (ref 0–2)
BUN SERPL-MCNC: 19 MG/DL (ref 8–23)
BUN/CREAT SERPL: 24 (ref 9–20)
CALCIUM SERPL-MCNC: 8.9 MG/DL (ref 8.6–10.4)
CHLORIDE SERPL-SCNC: 102 MMOL/L (ref 98–107)
CK SERPL-CCNC: 119 U/L (ref 39–308)
CO2 SERPL-SCNC: 22 MMOL/L (ref 20–31)
CREAT SERPL-MCNC: 0.8 MG/DL (ref 0.7–1.2)
EOSINOPHIL # BLD: 0.63 K/UL (ref 0–0.44)
EOSINOPHILS RELATIVE PERCENT: 8 % (ref 1–4)
ERYTHROCYTE [DISTWIDTH] IN BLOOD BY AUTOMATED COUNT: 15.3 % (ref 11.8–14.4)
GFR SERPL CREATININE-BSD FRML MDRD: 88 ML/MIN/1.73M2
GLUCOSE BLD-MCNC: 172 MG/DL (ref 75–110)
GLUCOSE BLD-MCNC: 211 MG/DL (ref 75–110)
GLUCOSE BLD-MCNC: 215 MG/DL (ref 75–110)
GLUCOSE BLD-MCNC: 247 MG/DL (ref 75–110)
GLUCOSE SERPL-MCNC: 180 MG/DL (ref 70–99)
HCT VFR BLD AUTO: 39.4 % (ref 40.7–50.3)
HGB BLD-MCNC: 13.2 G/DL (ref 13–17)
IMM GRANULOCYTES # BLD AUTO: 0.1 K/UL (ref 0–0.3)
IMM GRANULOCYTES NFR BLD: 1 %
LYMPHOCYTES NFR BLD: 2.06 K/UL (ref 1.1–3.7)
LYMPHOCYTES RELATIVE PERCENT: 26 % (ref 24–43)
MCH RBC QN AUTO: 27.8 PG (ref 25.2–33.5)
MCHC RBC AUTO-ENTMCNC: 33.5 G/DL (ref 28.4–34.8)
MCV RBC AUTO: 82.9 FL (ref 82.6–102.9)
MONOCYTES NFR BLD: 0.88 K/UL (ref 0.1–1.2)
MONOCYTES NFR BLD: 11 % (ref 3–12)
MYOGLOBIN SERPL-MCNC: 110 NG/ML (ref 28–72)
NEUTROPHILS NFR BLD: 53 % (ref 36–65)
NEUTS SEG NFR BLD: 4.1 K/UL (ref 1.5–8.1)
NRBC BLD-RTO: 0 PER 100 WBC
PLATELET # BLD AUTO: 312 K/UL (ref 138–453)
PMV BLD AUTO: 9.4 FL (ref 8.1–13.5)
POTASSIUM SERPL-SCNC: 4.1 MMOL/L (ref 3.7–5.3)
RBC # BLD AUTO: 4.75 M/UL (ref 4.21–5.77)
RBC # BLD: ABNORMAL 10*6/UL
SODIUM SERPL-SCNC: 135 MMOL/L (ref 135–144)
WBC OTHER # BLD: 7.8 K/UL (ref 3.5–11.3)

## 2024-03-31 PROCEDURE — 80048 BASIC METABOLIC PNL TOTAL CA: CPT

## 2024-03-31 PROCEDURE — 82550 ASSAY OF CK (CPK): CPT

## 2024-03-31 PROCEDURE — 2060000000 HC ICU INTERMEDIATE R&B

## 2024-03-31 PROCEDURE — 6370000000 HC RX 637 (ALT 250 FOR IP): Performed by: HOSPITALIST

## 2024-03-31 PROCEDURE — 83874 ASSAY OF MYOGLOBIN: CPT

## 2024-03-31 PROCEDURE — 6370000000 HC RX 637 (ALT 250 FOR IP): Performed by: PSYCHIATRY & NEUROLOGY

## 2024-03-31 PROCEDURE — 82947 ASSAY GLUCOSE BLOOD QUANT: CPT

## 2024-03-31 PROCEDURE — 6370000000 HC RX 637 (ALT 250 FOR IP): Performed by: INTERNAL MEDICINE

## 2024-03-31 PROCEDURE — 97116 GAIT TRAINING THERAPY: CPT

## 2024-03-31 PROCEDURE — 6370000000 HC RX 637 (ALT 250 FOR IP): Performed by: FAMILY MEDICINE

## 2024-03-31 PROCEDURE — 85025 COMPLETE CBC W/AUTO DIFF WBC: CPT

## 2024-03-31 PROCEDURE — 36415 COLL VENOUS BLD VENIPUNCTURE: CPT

## 2024-03-31 PROCEDURE — 2580000003 HC RX 258: Performed by: HOSPITALIST

## 2024-03-31 PROCEDURE — 97530 THERAPEUTIC ACTIVITIES: CPT

## 2024-03-31 RX ORDER — DOXAZOSIN MESYLATE 1 MG/1
2 TABLET ORAL NIGHTLY
Status: DISCONTINUED | OUTPATIENT
Start: 2024-03-31 | End: 2024-04-04

## 2024-03-31 RX ADMIN — ASPIRIN 81 MG CHEWABLE TABLET 81 MG: 81 TABLET CHEWABLE at 08:49

## 2024-03-31 RX ADMIN — ATORVASTATIN CALCIUM 80 MG: 80 TABLET, FILM COATED ORAL at 22:02

## 2024-03-31 RX ADMIN — INSULIN LISPRO 4 UNITS: 100 INJECTION, SOLUTION INTRAVENOUS; SUBCUTANEOUS at 09:01

## 2024-03-31 RX ADMIN — INSULIN GLARGINE 10 UNITS: 100 INJECTION, SOLUTION SUBCUTANEOUS at 09:02

## 2024-03-31 RX ADMIN — APIXABAN 5 MG: 5 TABLET, FILM COATED ORAL at 08:50

## 2024-03-31 RX ADMIN — SODIUM CHLORIDE, PRESERVATIVE FREE 10 ML: 5 INJECTION INTRAVENOUS at 07:07

## 2024-03-31 RX ADMIN — QUETIAPINE FUMARATE 25 MG: 25 TABLET ORAL at 22:02

## 2024-03-31 RX ADMIN — ACETAMINOPHEN 650 MG: 325 TABLET ORAL at 11:08

## 2024-03-31 RX ADMIN — GLIPIZIDE 10 MG: 10 TABLET ORAL at 07:07

## 2024-03-31 RX ADMIN — APIXABAN 5 MG: 5 TABLET, FILM COATED ORAL at 22:02

## 2024-03-31 RX ADMIN — SODIUM CHLORIDE, PRESERVATIVE FREE 10 ML: 5 INJECTION INTRAVENOUS at 22:02

## 2024-03-31 RX ADMIN — LOSARTAN POTASSIUM 100 MG: 100 TABLET, FILM COATED ORAL at 08:49

## 2024-03-31 RX ADMIN — INSULIN LISPRO 5 UNITS: 100 INJECTION, SOLUTION INTRAVENOUS; SUBCUTANEOUS at 09:02

## 2024-03-31 RX ADMIN — INSULIN LISPRO 5 UNITS: 100 INJECTION, SOLUTION INTRAVENOUS; SUBCUTANEOUS at 17:50

## 2024-03-31 RX ADMIN — MEMANTINE 5 MG: 10 TABLET ORAL at 08:49

## 2024-03-31 RX ADMIN — Medication 3 MG: at 22:02

## 2024-03-31 RX ADMIN — DOXAZOSIN 2 MG: 1 TABLET ORAL at 22:02

## 2024-03-31 RX ADMIN — INSULIN LISPRO 5 UNITS: 100 INJECTION, SOLUTION INTRAVENOUS; SUBCUTANEOUS at 12:18

## 2024-03-31 RX ADMIN — INSULIN LISPRO 4 UNITS: 100 INJECTION, SOLUTION INTRAVENOUS; SUBCUTANEOUS at 12:18

## 2024-03-31 RX ADMIN — INSULIN LISPRO 4 UNITS: 100 INJECTION, SOLUTION INTRAVENOUS; SUBCUTANEOUS at 17:49

## 2024-03-31 ASSESSMENT — PAIN DESCRIPTION - ORIENTATION: ORIENTATION: LEFT

## 2024-03-31 ASSESSMENT — PAIN SCALES - GENERAL
PAINLEVEL_OUTOF10: 3
PAINLEVEL_OUTOF10: 0

## 2024-03-31 ASSESSMENT — PAIN DESCRIPTION - LOCATION: LOCATION: KNEE

## 2024-03-31 ASSESSMENT — PAIN DESCRIPTION - DESCRIPTORS: DESCRIPTORS: ACHING

## 2024-03-31 ASSESSMENT — PAIN - FUNCTIONAL ASSESSMENT: PAIN_FUNCTIONAL_ASSESSMENT: ACTIVITIES ARE NOT PREVENTED

## 2024-04-01 LAB
ANION GAP SERPL CALCULATED.3IONS-SCNC: 9 MMOL/L (ref 9–17)
BASOPHILS # BLD: 0.07 K/UL (ref 0–0.2)
BASOPHILS NFR BLD: 1 % (ref 0–2)
BUN SERPL-MCNC: 20 MG/DL (ref 8–23)
BUN/CREAT SERPL: 25 (ref 9–20)
CALCIUM SERPL-MCNC: 8.9 MG/DL (ref 8.6–10.4)
CHLORIDE SERPL-SCNC: 103 MMOL/L (ref 98–107)
CK SERPL-CCNC: 60 U/L (ref 39–308)
CO2 SERPL-SCNC: 24 MMOL/L (ref 20–31)
CREAT SERPL-MCNC: 0.8 MG/DL (ref 0.7–1.2)
EOSINOPHIL # BLD: 0.59 K/UL (ref 0–0.44)
EOSINOPHILS RELATIVE PERCENT: 8 % (ref 1–4)
ERYTHROCYTE [DISTWIDTH] IN BLOOD BY AUTOMATED COUNT: 15.4 % (ref 11.8–14.4)
GFR SERPL CREATININE-BSD FRML MDRD: 88 ML/MIN/1.73M2
GLUCOSE BLD-MCNC: 174 MG/DL (ref 75–110)
GLUCOSE BLD-MCNC: 212 MG/DL (ref 75–110)
GLUCOSE BLD-MCNC: 213 MG/DL (ref 75–110)
GLUCOSE BLD-MCNC: 295 MG/DL (ref 75–110)
GLUCOSE SERPL-MCNC: 204 MG/DL (ref 70–99)
HCT VFR BLD AUTO: 38.5 % (ref 40.7–50.3)
HGB BLD-MCNC: 12.5 G/DL (ref 13–17)
IMM GRANULOCYTES # BLD AUTO: 0.11 K/UL (ref 0–0.3)
IMM GRANULOCYTES NFR BLD: 1 %
LYMPHOCYTES NFR BLD: 1.62 K/UL (ref 1.1–3.7)
LYMPHOCYTES RELATIVE PERCENT: 21 % (ref 24–43)
MCH RBC QN AUTO: 27.6 PG (ref 25.2–33.5)
MCHC RBC AUTO-ENTMCNC: 32.5 G/DL (ref 28.4–34.8)
MCV RBC AUTO: 85 FL (ref 82.6–102.9)
MONOCYTES NFR BLD: 0.84 K/UL (ref 0.1–1.2)
MONOCYTES NFR BLD: 11 % (ref 3–12)
MYOGLOBIN SERPL-MCNC: 55 NG/ML (ref 28–72)
NEUTROPHILS NFR BLD: 58 % (ref 36–65)
NEUTS SEG NFR BLD: 4.37 K/UL (ref 1.5–8.1)
NRBC BLD-RTO: 0 PER 100 WBC
PLATELET # BLD AUTO: 271 K/UL (ref 138–453)
PMV BLD AUTO: 9.4 FL (ref 8.1–13.5)
POTASSIUM SERPL-SCNC: 4.1 MMOL/L (ref 3.7–5.3)
RBC # BLD AUTO: 4.53 M/UL (ref 4.21–5.77)
RBC # BLD: ABNORMAL 10*6/UL
SODIUM SERPL-SCNC: 136 MMOL/L (ref 135–144)
WBC OTHER # BLD: 7.6 K/UL (ref 3.5–11.3)

## 2024-04-01 PROCEDURE — 2580000003 HC RX 258: Performed by: HOSPITALIST

## 2024-04-01 PROCEDURE — 6370000000 HC RX 637 (ALT 250 FOR IP): Performed by: INTERNAL MEDICINE

## 2024-04-01 PROCEDURE — 97530 THERAPEUTIC ACTIVITIES: CPT

## 2024-04-01 PROCEDURE — 97110 THERAPEUTIC EXERCISES: CPT

## 2024-04-01 PROCEDURE — 85025 COMPLETE CBC W/AUTO DIFF WBC: CPT

## 2024-04-01 PROCEDURE — 82947 ASSAY GLUCOSE BLOOD QUANT: CPT

## 2024-04-01 PROCEDURE — 80048 BASIC METABOLIC PNL TOTAL CA: CPT

## 2024-04-01 PROCEDURE — 97129 THER IVNTJ 1ST 15 MIN: CPT

## 2024-04-01 PROCEDURE — 6370000000 HC RX 637 (ALT 250 FOR IP): Performed by: HOSPITALIST

## 2024-04-01 PROCEDURE — 97535 SELF CARE MNGMENT TRAINING: CPT

## 2024-04-01 PROCEDURE — 97130 THER IVNTJ EA ADDL 15 MIN: CPT

## 2024-04-01 PROCEDURE — 82550 ASSAY OF CK (CPK): CPT

## 2024-04-01 PROCEDURE — 6370000000 HC RX 637 (ALT 250 FOR IP): Performed by: PSYCHIATRY & NEUROLOGY

## 2024-04-01 PROCEDURE — 94761 N-INVAS EAR/PLS OXIMETRY MLT: CPT

## 2024-04-01 PROCEDURE — 83874 ASSAY OF MYOGLOBIN: CPT

## 2024-04-01 PROCEDURE — 36415 COLL VENOUS BLD VENIPUNCTURE: CPT

## 2024-04-01 PROCEDURE — 97116 GAIT TRAINING THERAPY: CPT

## 2024-04-01 PROCEDURE — 2060000000 HC ICU INTERMEDIATE R&B

## 2024-04-01 RX ORDER — DOXAZOSIN 2 MG/1
2 TABLET ORAL NIGHTLY
Qty: 30 TABLET | Refills: 3 | Status: SHIPPED | OUTPATIENT
Start: 2024-04-01 | End: 2024-04-04 | Stop reason: HOSPADM

## 2024-04-01 RX ORDER — LOSARTAN POTASSIUM 100 MG/1
100 TABLET ORAL DAILY
Qty: 30 TABLET | Refills: 3 | Status: SHIPPED | OUTPATIENT
Start: 2024-04-02

## 2024-04-01 RX ADMIN — APIXABAN 5 MG: 5 TABLET, FILM COATED ORAL at 07:59

## 2024-04-01 RX ADMIN — INSULIN GLARGINE 10 UNITS: 100 INJECTION, SOLUTION SUBCUTANEOUS at 08:00

## 2024-04-01 RX ADMIN — INSULIN LISPRO 5 UNITS: 100 INJECTION, SOLUTION INTRAVENOUS; SUBCUTANEOUS at 17:22

## 2024-04-01 RX ADMIN — INSULIN LISPRO 5 UNITS: 100 INJECTION, SOLUTION INTRAVENOUS; SUBCUTANEOUS at 14:27

## 2024-04-01 RX ADMIN — SODIUM CHLORIDE, PRESERVATIVE FREE 10 ML: 5 INJECTION INTRAVENOUS at 08:00

## 2024-04-01 RX ADMIN — GLIPIZIDE 10 MG: 10 TABLET ORAL at 06:09

## 2024-04-01 RX ADMIN — MEMANTINE 5 MG: 10 TABLET ORAL at 07:59

## 2024-04-01 RX ADMIN — INSULIN LISPRO 8 UNITS: 100 INJECTION, SOLUTION INTRAVENOUS; SUBCUTANEOUS at 17:21

## 2024-04-01 RX ADMIN — ATORVASTATIN CALCIUM 80 MG: 80 TABLET, FILM COATED ORAL at 20:22

## 2024-04-01 RX ADMIN — ASPIRIN 81 MG CHEWABLE TABLET 81 MG: 81 TABLET CHEWABLE at 07:58

## 2024-04-01 RX ADMIN — QUETIAPINE FUMARATE 25 MG: 25 TABLET ORAL at 20:22

## 2024-04-01 RX ADMIN — DOXAZOSIN 2 MG: 1 TABLET ORAL at 20:21

## 2024-04-01 RX ADMIN — APIXABAN 5 MG: 5 TABLET, FILM COATED ORAL at 20:22

## 2024-04-01 RX ADMIN — LOSARTAN POTASSIUM 100 MG: 100 TABLET, FILM COATED ORAL at 07:58

## 2024-04-01 RX ADMIN — SODIUM CHLORIDE, PRESERVATIVE FREE 10 ML: 5 INJECTION INTRAVENOUS at 20:23

## 2024-04-01 NOTE — DISCHARGE SUMMARY
Presentation blood glucose was 554   Continue home dose of glimepiride  Added Lantus 10 units daily with high intensity sliding scale  Humalog 5 units Premeal with hold parameters  Monitor blood glucose     Hyponatremia : Resolved  Likely from hyperglycemia   Monitor sodium     Acute kidney injury : Resolved  Creatinine was 1.3 on presentation   IV fluids discontinued  Monitor creatinine     Lactic acidosis  Elevated lactate at 2.2   IV fluids   Monitor lactate and procalcitonin     R arm weakness likely radial nerve palsy  MRI cervical spine with moderate to severe spinal stenosis.  Neurology following recommended outpatient NSGY  MRI cervical spine was reviewed     Rt lower arm wound  Wound care     Hypertensive heart disease  Monitor blood pressure   Prn Hydralazine     Paroxysmal atrial fibrillation   Monitor heart rate   On Eliquis     Coronary artery disease   No current anginal symptoms      Morbid obesity  Complicates overall presentation      Alzheimer's dementia  Memantine per Neurology     Agitation- resolved           At the time of discharge patient was hemodynamically stable and asymptomatic.    The plan was discussed in detail with patient who agreed with the plan and verbalized understanding .    The patient was seen and examined on day of discharge and this discharge summary is in conjunction with any daily progress note from day of discharge.    Hospital Data:    Labs:    Hematology:  Recent Labs     04/05/24 0337 04/06/24 0340 04/07/24 0322   WBC 7.4 6.9 10.0   RBC 4.37 4.32 4.32   HGB 11.8* 12.0* 12.0*   HCT 37.9* 36.9* 37.1*   MCV 86.7 85.4 85.9   MCH 27.0 27.8 27.8   MCHC 31.1 32.5 32.3   RDW 15.1* 15.3* 15.4*    241 231   MPV 9.4 9.7 9.5     Chemistry:  Recent Labs     04/05/24 0337 04/06/24 0340 04/07/24  0322    135 134*   K 4.1 4.1 4.1    102 101   CO2 22 24 25   GLUCOSE 168* 142* 140*   BUN 21 22 23   CREATININE 0.9 1.0 1.0   ANIONGAP 11 9 8*   LABGLOM 85 75 75

## 2024-04-02 LAB
ANION GAP SERPL CALCULATED.3IONS-SCNC: 12 MMOL/L (ref 9–17)
BASOPHILS # BLD: 0.06 K/UL (ref 0–0.2)
BASOPHILS NFR BLD: 1 % (ref 0–2)
BUN SERPL-MCNC: 23 MG/DL (ref 8–23)
BUN/CREAT SERPL: 23 (ref 9–20)
CALCIUM SERPL-MCNC: 8.9 MG/DL (ref 8.6–10.4)
CHLORIDE SERPL-SCNC: 103 MMOL/L (ref 98–107)
CK SERPL-CCNC: 64 U/L (ref 39–308)
CO2 SERPL-SCNC: 21 MMOL/L (ref 20–31)
CREAT SERPL-MCNC: 1 MG/DL (ref 0.7–1.2)
EOSINOPHIL # BLD: 0.55 K/UL (ref 0–0.44)
EOSINOPHILS RELATIVE PERCENT: 8 % (ref 1–4)
ERYTHROCYTE [DISTWIDTH] IN BLOOD BY AUTOMATED COUNT: 15.3 % (ref 11.8–14.4)
GFR SERPL CREATININE-BSD FRML MDRD: 75 ML/MIN/1.73M2
GLUCOSE BLD-MCNC: 152 MG/DL (ref 75–110)
GLUCOSE BLD-MCNC: 174 MG/DL (ref 75–110)
GLUCOSE BLD-MCNC: 187 MG/DL (ref 75–110)
GLUCOSE BLD-MCNC: 210 MG/DL (ref 75–110)
GLUCOSE SERPL-MCNC: 207 MG/DL (ref 70–99)
HCT VFR BLD AUTO: 43.9 % (ref 40.7–50.3)
HGB BLD-MCNC: 14.3 G/DL (ref 13–17)
IMM GRANULOCYTES # BLD AUTO: 0.08 K/UL (ref 0–0.3)
IMM GRANULOCYTES NFR BLD: 1 %
LYMPHOCYTES NFR BLD: 1.4 K/UL (ref 1.1–3.7)
LYMPHOCYTES RELATIVE PERCENT: 20 % (ref 24–43)
MCH RBC QN AUTO: 27.3 PG (ref 25.2–33.5)
MCHC RBC AUTO-ENTMCNC: 32.6 G/DL (ref 28.4–34.8)
MCV RBC AUTO: 83.8 FL (ref 82.6–102.9)
MONOCYTES NFR BLD: 0.67 K/UL (ref 0.1–1.2)
MONOCYTES NFR BLD: 10 % (ref 3–12)
MYOGLOBIN SERPL-MCNC: 58 NG/ML (ref 28–72)
NEUTROPHILS NFR BLD: 60 % (ref 36–65)
NEUTS SEG NFR BLD: 4.25 K/UL (ref 1.5–8.1)
NRBC BLD-RTO: 0 PER 100 WBC
PLATELET # BLD AUTO: 225 K/UL (ref 138–453)
PMV BLD AUTO: 9.3 FL (ref 8.1–13.5)
POTASSIUM SERPL-SCNC: 4.4 MMOL/L (ref 3.7–5.3)
RBC # BLD AUTO: 5.24 M/UL (ref 4.21–5.77)
RBC # BLD: ABNORMAL 10*6/UL
SODIUM SERPL-SCNC: 136 MMOL/L (ref 135–144)
WBC OTHER # BLD: 7 K/UL (ref 3.5–11.3)

## 2024-04-02 PROCEDURE — 80048 BASIC METABOLIC PNL TOTAL CA: CPT

## 2024-04-02 PROCEDURE — 97130 THER IVNTJ EA ADDL 15 MIN: CPT

## 2024-04-02 PROCEDURE — 97535 SELF CARE MNGMENT TRAINING: CPT

## 2024-04-02 PROCEDURE — 6370000000 HC RX 637 (ALT 250 FOR IP): Performed by: PSYCHIATRY & NEUROLOGY

## 2024-04-02 PROCEDURE — 36415 COLL VENOUS BLD VENIPUNCTURE: CPT

## 2024-04-02 PROCEDURE — 97110 THERAPEUTIC EXERCISES: CPT

## 2024-04-02 PROCEDURE — 6370000000 HC RX 637 (ALT 250 FOR IP): Performed by: HOSPITALIST

## 2024-04-02 PROCEDURE — 82947 ASSAY GLUCOSE BLOOD QUANT: CPT

## 2024-04-02 PROCEDURE — 6370000000 HC RX 637 (ALT 250 FOR IP): Performed by: INTERNAL MEDICINE

## 2024-04-02 PROCEDURE — 83874 ASSAY OF MYOGLOBIN: CPT

## 2024-04-02 PROCEDURE — 85025 COMPLETE CBC W/AUTO DIFF WBC: CPT

## 2024-04-02 PROCEDURE — 2580000003 HC RX 258: Performed by: HOSPITALIST

## 2024-04-02 PROCEDURE — 94761 N-INVAS EAR/PLS OXIMETRY MLT: CPT

## 2024-04-02 PROCEDURE — 97129 THER IVNTJ 1ST 15 MIN: CPT

## 2024-04-02 PROCEDURE — 82550 ASSAY OF CK (CPK): CPT

## 2024-04-02 PROCEDURE — 2060000000 HC ICU INTERMEDIATE R&B

## 2024-04-02 RX ADMIN — SODIUM CHLORIDE, PRESERVATIVE FREE 10 ML: 5 INJECTION INTRAVENOUS at 21:00

## 2024-04-02 RX ADMIN — INSULIN LISPRO 5 UNITS: 100 INJECTION, SOLUTION INTRAVENOUS; SUBCUTANEOUS at 13:23

## 2024-04-02 RX ADMIN — LOSARTAN POTASSIUM 100 MG: 100 TABLET, FILM COATED ORAL at 09:58

## 2024-04-02 RX ADMIN — DOXAZOSIN 2 MG: 1 TABLET ORAL at 20:58

## 2024-04-02 RX ADMIN — SODIUM CHLORIDE, PRESERVATIVE FREE 10 ML: 5 INJECTION INTRAVENOUS at 10:00

## 2024-04-02 RX ADMIN — QUETIAPINE FUMARATE 25 MG: 25 TABLET ORAL at 20:58

## 2024-04-02 RX ADMIN — ASPIRIN 81 MG CHEWABLE TABLET 81 MG: 81 TABLET CHEWABLE at 09:58

## 2024-04-02 RX ADMIN — INSULIN LISPRO 5 UNITS: 100 INJECTION, SOLUTION INTRAVENOUS; SUBCUTANEOUS at 09:53

## 2024-04-02 RX ADMIN — APIXABAN 5 MG: 5 TABLET, FILM COATED ORAL at 09:58

## 2024-04-02 RX ADMIN — INSULIN LISPRO 5 UNITS: 100 INJECTION, SOLUTION INTRAVENOUS; SUBCUTANEOUS at 17:23

## 2024-04-02 RX ADMIN — APIXABAN 5 MG: 5 TABLET, FILM COATED ORAL at 20:58

## 2024-04-02 RX ADMIN — MEMANTINE 5 MG: 10 TABLET ORAL at 09:58

## 2024-04-02 RX ADMIN — ATORVASTATIN CALCIUM 80 MG: 80 TABLET, FILM COATED ORAL at 20:58

## 2024-04-02 RX ADMIN — GLIPIZIDE 10 MG: 10 TABLET ORAL at 06:13

## 2024-04-02 RX ADMIN — INSULIN GLARGINE 10 UNITS: 100 INJECTION, SOLUTION SUBCUTANEOUS at 09:53

## 2024-04-02 NOTE — CARE COORDINATION
Social Work-[facility]. They stated that when they contacted [employer] that they were told that the system was down yesterday. Still waiting for auth. Shahriar

## 2024-04-03 LAB
ANION GAP SERPL CALCULATED.3IONS-SCNC: 9 MMOL/L (ref 9–17)
BASOPHILS # BLD: 0.08 K/UL (ref 0–0.2)
BASOPHILS NFR BLD: 1 % (ref 0–2)
BUN SERPL-MCNC: 21 MG/DL (ref 8–23)
BUN/CREAT SERPL: 21 (ref 9–20)
CALCIUM SERPL-MCNC: 8.7 MG/DL (ref 8.6–10.4)
CHLORIDE SERPL-SCNC: 101 MMOL/L (ref 98–107)
CK SERPL-CCNC: 50 U/L (ref 39–308)
CO2 SERPL-SCNC: 25 MMOL/L (ref 20–31)
CREAT SERPL-MCNC: 1 MG/DL (ref 0.7–1.2)
EOSINOPHIL # BLD: 0.61 K/UL (ref 0–0.44)
EOSINOPHILS RELATIVE PERCENT: 8 % (ref 1–4)
ERYTHROCYTE [DISTWIDTH] IN BLOOD BY AUTOMATED COUNT: 15.3 % (ref 11.8–14.4)
GFR SERPL CREATININE-BSD FRML MDRD: 75 ML/MIN/1.73M2
GLUCOSE BLD-MCNC: 169 MG/DL (ref 75–110)
GLUCOSE BLD-MCNC: 191 MG/DL (ref 75–110)
GLUCOSE BLD-MCNC: 227 MG/DL (ref 75–110)
GLUCOSE SERPL-MCNC: 183 MG/DL (ref 70–99)
HCT VFR BLD AUTO: 38.7 % (ref 40.7–50.3)
HGB BLD-MCNC: 12.4 G/DL (ref 13–17)
IMM GRANULOCYTES # BLD AUTO: 0.06 K/UL (ref 0–0.3)
IMM GRANULOCYTES NFR BLD: 1 %
LYMPHOCYTES NFR BLD: 1.83 K/UL (ref 1.1–3.7)
LYMPHOCYTES RELATIVE PERCENT: 23 % (ref 24–43)
MCH RBC QN AUTO: 27.2 PG (ref 25.2–33.5)
MCHC RBC AUTO-ENTMCNC: 32 G/DL (ref 28.4–34.8)
MCV RBC AUTO: 84.9 FL (ref 82.6–102.9)
MONOCYTES NFR BLD: 0.75 K/UL (ref 0.1–1.2)
MONOCYTES NFR BLD: 9 % (ref 3–12)
MYOGLOBIN SERPL-MCNC: 59 NG/ML (ref 28–72)
NEUTROPHILS NFR BLD: 58 % (ref 36–65)
NEUTS SEG NFR BLD: 4.63 K/UL (ref 1.5–8.1)
NRBC BLD-RTO: 0 PER 100 WBC
PLATELET # BLD AUTO: 247 K/UL (ref 138–453)
PMV BLD AUTO: 9.2 FL (ref 8.1–13.5)
POTASSIUM SERPL-SCNC: 4 MMOL/L (ref 3.7–5.3)
RBC # BLD AUTO: 4.56 M/UL (ref 4.21–5.77)
RBC # BLD: ABNORMAL 10*6/UL
SODIUM SERPL-SCNC: 135 MMOL/L (ref 135–144)
WBC OTHER # BLD: 8 K/UL (ref 3.5–11.3)

## 2024-04-03 PROCEDURE — 82947 ASSAY GLUCOSE BLOOD QUANT: CPT

## 2024-04-03 PROCEDURE — 6370000000 HC RX 637 (ALT 250 FOR IP): Performed by: INTERNAL MEDICINE

## 2024-04-03 PROCEDURE — 6370000000 HC RX 637 (ALT 250 FOR IP): Performed by: FAMILY MEDICINE

## 2024-04-03 PROCEDURE — 6370000000 HC RX 637 (ALT 250 FOR IP): Performed by: PSYCHIATRY & NEUROLOGY

## 2024-04-03 PROCEDURE — 97530 THERAPEUTIC ACTIVITIES: CPT

## 2024-04-03 PROCEDURE — 2580000003 HC RX 258: Performed by: HOSPITALIST

## 2024-04-03 PROCEDURE — 97129 THER IVNTJ 1ST 15 MIN: CPT

## 2024-04-03 PROCEDURE — 6370000000 HC RX 637 (ALT 250 FOR IP): Performed by: HOSPITALIST

## 2024-04-03 PROCEDURE — 85025 COMPLETE CBC W/AUTO DIFF WBC: CPT

## 2024-04-03 PROCEDURE — 36415 COLL VENOUS BLD VENIPUNCTURE: CPT

## 2024-04-03 PROCEDURE — 97116 GAIT TRAINING THERAPY: CPT

## 2024-04-03 PROCEDURE — 97110 THERAPEUTIC EXERCISES: CPT

## 2024-04-03 PROCEDURE — 2060000000 HC ICU INTERMEDIATE R&B

## 2024-04-03 PROCEDURE — 82550 ASSAY OF CK (CPK): CPT

## 2024-04-03 PROCEDURE — 83874 ASSAY OF MYOGLOBIN: CPT

## 2024-04-03 PROCEDURE — 80048 BASIC METABOLIC PNL TOTAL CA: CPT

## 2024-04-03 PROCEDURE — 94761 N-INVAS EAR/PLS OXIMETRY MLT: CPT

## 2024-04-03 PROCEDURE — 92507 TX SP LANG VOICE COMM INDIV: CPT

## 2024-04-03 RX ADMIN — APIXABAN 5 MG: 5 TABLET, FILM COATED ORAL at 09:41

## 2024-04-03 RX ADMIN — INSULIN LISPRO 5 UNITS: 100 INJECTION, SOLUTION INTRAVENOUS; SUBCUTANEOUS at 17:34

## 2024-04-03 RX ADMIN — INSULIN LISPRO 5 UNITS: 100 INJECTION, SOLUTION INTRAVENOUS; SUBCUTANEOUS at 12:04

## 2024-04-03 RX ADMIN — SODIUM CHLORIDE, PRESERVATIVE FREE 10 ML: 5 INJECTION INTRAVENOUS at 09:42

## 2024-04-03 RX ADMIN — SODIUM CHLORIDE, PRESERVATIVE FREE 10 ML: 5 INJECTION INTRAVENOUS at 20:38

## 2024-04-03 RX ADMIN — LOSARTAN POTASSIUM 100 MG: 100 TABLET, FILM COATED ORAL at 09:41

## 2024-04-03 RX ADMIN — DOXAZOSIN 2 MG: 1 TABLET ORAL at 20:38

## 2024-04-03 RX ADMIN — ACETAMINOPHEN 650 MG: 325 TABLET ORAL at 09:41

## 2024-04-03 RX ADMIN — Medication 3 MG: at 23:41

## 2024-04-03 RX ADMIN — ASPIRIN 81 MG CHEWABLE TABLET 81 MG: 81 TABLET CHEWABLE at 09:41

## 2024-04-03 RX ADMIN — APIXABAN 5 MG: 5 TABLET, FILM COATED ORAL at 20:39

## 2024-04-03 RX ADMIN — INSULIN GLARGINE 10 UNITS: 100 INJECTION, SOLUTION SUBCUTANEOUS at 09:41

## 2024-04-03 RX ADMIN — MEMANTINE 5 MG: 10 TABLET ORAL at 09:41

## 2024-04-03 RX ADMIN — INSULIN LISPRO 4 UNITS: 100 INJECTION, SOLUTION INTRAVENOUS; SUBCUTANEOUS at 17:34

## 2024-04-03 RX ADMIN — QUETIAPINE FUMARATE 25 MG: 25 TABLET ORAL at 20:39

## 2024-04-03 RX ADMIN — ATORVASTATIN CALCIUM 80 MG: 80 TABLET, FILM COATED ORAL at 20:38

## 2024-04-03 RX ADMIN — INSULIN LISPRO 5 UNITS: 100 INJECTION, SOLUTION INTRAVENOUS; SUBCUTANEOUS at 09:41

## 2024-04-03 RX ADMIN — GLIPIZIDE 10 MG: 10 TABLET ORAL at 06:33

## 2024-04-03 ASSESSMENT — PAIN SCALES - GENERAL: PAINLEVEL_OUTOF10: 0

## 2024-04-03 NOTE — CARE COORDINATION
Social Work-[facility] spoke with the third rep from [facility]. They were told that the auth had to be submitted in all cap letters. [facility] resubmitted auth. When [facility] called the first 2 times to [facility], the reps were not able to assist with finding the reason that case was not reviewed. Tammy

## 2024-04-03 NOTE — CARE COORDINATION
Social Work-Spoke with [facility]. Auth has not been received. Albaro call to HELP to verify insurance. Tammy

## 2024-04-03 NOTE — CARE COORDINATION
Social Work-Contacted [facility]. They have not received auth. They contacted [facility] an hour ago and it was under medical review. Updated daughter. JairEm

## 2024-04-04 LAB
ANION GAP SERPL CALCULATED.3IONS-SCNC: 10 MMOL/L (ref 9–17)
BASOPHILS # BLD: 0.08 K/UL (ref 0–0.2)
BASOPHILS NFR BLD: 1 % (ref 0–2)
BUN SERPL-MCNC: 19 MG/DL (ref 8–23)
BUN/CREAT SERPL: 19 (ref 9–20)
CALCIUM SERPL-MCNC: 8.8 MG/DL (ref 8.6–10.4)
CHLORIDE SERPL-SCNC: 97 MMOL/L (ref 98–107)
CK SERPL-CCNC: 52 U/L (ref 39–308)
CO2 SERPL-SCNC: 24 MMOL/L (ref 20–31)
CREAT SERPL-MCNC: 1 MG/DL (ref 0.7–1.2)
EOSINOPHIL # BLD: 0.62 K/UL (ref 0–0.44)
EOSINOPHILS RELATIVE PERCENT: 8 % (ref 1–4)
ERYTHROCYTE [DISTWIDTH] IN BLOOD BY AUTOMATED COUNT: 15.2 % (ref 11.8–14.4)
GFR SERPL CREATININE-BSD FRML MDRD: 75 ML/MIN/1.73M2
GLUCOSE BLD-MCNC: 133 MG/DL (ref 75–110)
GLUCOSE BLD-MCNC: 179 MG/DL (ref 75–110)
GLUCOSE BLD-MCNC: 179 MG/DL (ref 75–110)
GLUCOSE BLD-MCNC: 227 MG/DL (ref 75–110)
GLUCOSE SERPL-MCNC: 157 MG/DL (ref 70–99)
HCT VFR BLD AUTO: 37.2 % (ref 40.7–50.3)
HGB BLD-MCNC: 12 G/DL (ref 13–17)
IMM GRANULOCYTES # BLD AUTO: 0.04 K/UL (ref 0–0.3)
IMM GRANULOCYTES NFR BLD: 1 %
LYMPHOCYTES NFR BLD: 1.68 K/UL (ref 1.1–3.7)
LYMPHOCYTES RELATIVE PERCENT: 21 % (ref 24–43)
MCH RBC QN AUTO: 27.5 PG (ref 25.2–33.5)
MCHC RBC AUTO-ENTMCNC: 32.3 G/DL (ref 28.4–34.8)
MCV RBC AUTO: 85.1 FL (ref 82.6–102.9)
MONOCYTES NFR BLD: 0.78 K/UL (ref 0.1–1.2)
MONOCYTES NFR BLD: 10 % (ref 3–12)
MYOGLOBIN SERPL-MCNC: 59 NG/ML (ref 28–72)
NEUTROPHILS NFR BLD: 59 % (ref 36–65)
NEUTS SEG NFR BLD: 4.65 K/UL (ref 1.5–8.1)
NRBC BLD-RTO: 0 PER 100 WBC
PLATELET # BLD AUTO: 256 K/UL (ref 138–453)
PMV BLD AUTO: 9.7 FL (ref 8.1–13.5)
POTASSIUM SERPL-SCNC: 3.9 MMOL/L (ref 3.7–5.3)
RBC # BLD AUTO: 4.37 M/UL (ref 4.21–5.77)
RBC # BLD: ABNORMAL 10*6/UL
SODIUM SERPL-SCNC: 131 MMOL/L (ref 135–144)
WBC OTHER # BLD: 7.9 K/UL (ref 3.5–11.3)

## 2024-04-04 PROCEDURE — 6370000000 HC RX 637 (ALT 250 FOR IP): Performed by: INTERNAL MEDICINE

## 2024-04-04 PROCEDURE — 82550 ASSAY OF CK (CPK): CPT

## 2024-04-04 PROCEDURE — 2060000000 HC ICU INTERMEDIATE R&B

## 2024-04-04 PROCEDURE — 83874 ASSAY OF MYOGLOBIN: CPT

## 2024-04-04 PROCEDURE — 6370000000 HC RX 637 (ALT 250 FOR IP): Performed by: PSYCHIATRY & NEUROLOGY

## 2024-04-04 PROCEDURE — 36415 COLL VENOUS BLD VENIPUNCTURE: CPT

## 2024-04-04 PROCEDURE — 6370000000 HC RX 637 (ALT 250 FOR IP): Performed by: FAMILY MEDICINE

## 2024-04-04 PROCEDURE — 97535 SELF CARE MNGMENT TRAINING: CPT

## 2024-04-04 PROCEDURE — 97116 GAIT TRAINING THERAPY: CPT

## 2024-04-04 PROCEDURE — 80048 BASIC METABOLIC PNL TOTAL CA: CPT

## 2024-04-04 PROCEDURE — 85025 COMPLETE CBC W/AUTO DIFF WBC: CPT

## 2024-04-04 PROCEDURE — 2580000003 HC RX 258: Performed by: HOSPITALIST

## 2024-04-04 PROCEDURE — 82947 ASSAY GLUCOSE BLOOD QUANT: CPT

## 2024-04-04 PROCEDURE — 97110 THERAPEUTIC EXERCISES: CPT

## 2024-04-04 PROCEDURE — 6370000000 HC RX 637 (ALT 250 FOR IP): Performed by: NURSE PRACTITIONER

## 2024-04-04 PROCEDURE — 6370000000 HC RX 637 (ALT 250 FOR IP): Performed by: HOSPITALIST

## 2024-04-04 RX ORDER — DOXAZOSIN MESYLATE 4 MG/1
4 TABLET ORAL NIGHTLY
Qty: 30 TABLET | Refills: 3 | DISCHARGE
Start: 2024-04-04

## 2024-04-04 RX ORDER — DOXAZOSIN MESYLATE 4 MG/1
4 TABLET ORAL NIGHTLY
Status: DISCONTINUED | OUTPATIENT
Start: 2024-04-04 | End: 2024-04-07 | Stop reason: HOSPADM

## 2024-04-04 RX ADMIN — INSULIN LISPRO 5 UNITS: 100 INJECTION, SOLUTION INTRAVENOUS; SUBCUTANEOUS at 12:42

## 2024-04-04 RX ADMIN — LOSARTAN POTASSIUM 100 MG: 100 TABLET, FILM COATED ORAL at 09:55

## 2024-04-04 RX ADMIN — APIXABAN 5 MG: 5 TABLET, FILM COATED ORAL at 09:55

## 2024-04-04 RX ADMIN — DOXAZOSIN 4 MG: 4 TABLET ORAL at 20:16

## 2024-04-04 RX ADMIN — GLIPIZIDE 10 MG: 10 TABLET ORAL at 06:10

## 2024-04-04 RX ADMIN — ASPIRIN 81 MG CHEWABLE TABLET 81 MG: 81 TABLET CHEWABLE at 09:55

## 2024-04-04 RX ADMIN — ATORVASTATIN CALCIUM 80 MG: 80 TABLET, FILM COATED ORAL at 20:17

## 2024-04-04 RX ADMIN — APIXABAN 5 MG: 5 TABLET, FILM COATED ORAL at 20:17

## 2024-04-04 RX ADMIN — Medication 3 MG: at 20:16

## 2024-04-04 RX ADMIN — SODIUM CHLORIDE, PRESERVATIVE FREE 10 ML: 5 INJECTION INTRAVENOUS at 20:17

## 2024-04-04 RX ADMIN — QUETIAPINE FUMARATE 25 MG: 25 TABLET ORAL at 20:17

## 2024-04-04 RX ADMIN — SODIUM CHLORIDE, PRESERVATIVE FREE 10 ML: 5 INJECTION INTRAVENOUS at 09:55

## 2024-04-04 RX ADMIN — INSULIN GLARGINE 10 UNITS: 100 INJECTION, SOLUTION SUBCUTANEOUS at 09:55

## 2024-04-04 RX ADMIN — INSULIN LISPRO 5 UNITS: 100 INJECTION, SOLUTION INTRAVENOUS; SUBCUTANEOUS at 09:59

## 2024-04-04 RX ADMIN — INSULIN LISPRO 5 UNITS: 100 INJECTION, SOLUTION INTRAVENOUS; SUBCUTANEOUS at 18:31

## 2024-04-04 RX ADMIN — MEMANTINE 5 MG: 10 TABLET ORAL at 09:55

## 2024-04-04 ASSESSMENT — PAIN SCALES - GENERAL
PAINLEVEL_OUTOF10: 0
PAINLEVEL_OUTOF10: 0

## 2024-04-04 NOTE — CARE COORDINATION
Social Work-[facility] received a denial P2P was offered. The doctor will need to call directly to [phone]. They will need name// insurance ID number. Dr José is willing to complete P2P. Tammy

## 2024-04-04 NOTE — CARE COORDINATION
Social Work-P2 P was completed. Pt was denied acute rehab. Discussed with daughter. She would like an expedited appeal. Contacted [facility]. . They will fax the  authorization form for daughter to sign so they can begin expedited appeal. Once form is faxed to  for daughter to complete, it will need to be faxed to daughter at [phone]. Tammy   Dapsone Counseling: I discussed with the patient the risks of dapsone including but not limited to hemolytic anemia, agranulocytosis, rashes, methemoglobinemia, kidney failure, peripheral neuropathy, headaches, GI upset, and liver toxicity.  Patients who start dapsone require monitoring including baseline LFTs and weekly CBCs for the first month, then every month thereafter.  The patient verbalized understanding of the proper use and possible adverse effects of dapsone.  All of the patient's questions and concerns were addressed.

## 2024-04-05 LAB
ANION GAP SERPL CALCULATED.3IONS-SCNC: 11 MMOL/L (ref 9–17)
BASOPHILS # BLD: 0.06 K/UL (ref 0–0.2)
BASOPHILS NFR BLD: 1 % (ref 0–2)
BUN SERPL-MCNC: 21 MG/DL (ref 8–23)
BUN/CREAT SERPL: 23 (ref 9–20)
CALCIUM SERPL-MCNC: 8.8 MG/DL (ref 8.6–10.4)
CHLORIDE SERPL-SCNC: 104 MMOL/L (ref 98–107)
CK SERPL-CCNC: 50 U/L (ref 39–308)
CO2 SERPL-SCNC: 22 MMOL/L (ref 20–31)
CREAT SERPL-MCNC: 0.9 MG/DL (ref 0.7–1.2)
EOSINOPHIL # BLD: 0.59 K/UL (ref 0–0.44)
EOSINOPHILS RELATIVE PERCENT: 8 % (ref 1–4)
ERYTHROCYTE [DISTWIDTH] IN BLOOD BY AUTOMATED COUNT: 15.1 % (ref 11.8–14.4)
GFR SERPL CREATININE-BSD FRML MDRD: 85 ML/MIN/1.73M2
GLUCOSE BLD-MCNC: 154 MG/DL (ref 75–110)
GLUCOSE BLD-MCNC: 155 MG/DL (ref 75–110)
GLUCOSE BLD-MCNC: 168 MG/DL (ref 75–110)
GLUCOSE BLD-MCNC: 173 MG/DL (ref 75–110)
GLUCOSE SERPL-MCNC: 168 MG/DL (ref 70–99)
HCT VFR BLD AUTO: 37.9 % (ref 40.7–50.3)
HGB BLD-MCNC: 11.8 G/DL (ref 13–17)
IMM GRANULOCYTES # BLD AUTO: 0.03 K/UL (ref 0–0.3)
IMM GRANULOCYTES NFR BLD: 0 %
LYMPHOCYTES NFR BLD: 1.48 K/UL (ref 1.1–3.7)
LYMPHOCYTES RELATIVE PERCENT: 20 % (ref 24–43)
MCH RBC QN AUTO: 27 PG (ref 25.2–33.5)
MCHC RBC AUTO-ENTMCNC: 31.1 G/DL (ref 28.4–34.8)
MCV RBC AUTO: 86.7 FL (ref 82.6–102.9)
MONOCYTES NFR BLD: 0.75 K/UL (ref 0.1–1.2)
MONOCYTES NFR BLD: 10 % (ref 3–12)
MYOGLOBIN SERPL-MCNC: 48 NG/ML (ref 28–72)
NEUTROPHILS NFR BLD: 61 % (ref 36–65)
NEUTS SEG NFR BLD: 4.52 K/UL (ref 1.5–8.1)
NRBC BLD-RTO: 0 PER 100 WBC
PLATELET # BLD AUTO: 243 K/UL (ref 138–453)
PMV BLD AUTO: 9.4 FL (ref 8.1–13.5)
POTASSIUM SERPL-SCNC: 4.1 MMOL/L (ref 3.7–5.3)
RBC # BLD AUTO: 4.37 M/UL (ref 4.21–5.77)
RBC # BLD: ABNORMAL 10*6/UL
SODIUM SERPL-SCNC: 137 MMOL/L (ref 135–144)
WBC OTHER # BLD: 7.4 K/UL (ref 3.5–11.3)

## 2024-04-05 PROCEDURE — 6370000000 HC RX 637 (ALT 250 FOR IP): Performed by: INTERNAL MEDICINE

## 2024-04-05 PROCEDURE — 97110 THERAPEUTIC EXERCISES: CPT

## 2024-04-05 PROCEDURE — 97116 GAIT TRAINING THERAPY: CPT

## 2024-04-05 PROCEDURE — 6370000000 HC RX 637 (ALT 250 FOR IP): Performed by: FAMILY MEDICINE

## 2024-04-05 PROCEDURE — 2060000000 HC ICU INTERMEDIATE R&B

## 2024-04-05 PROCEDURE — 85025 COMPLETE CBC W/AUTO DIFF WBC: CPT

## 2024-04-05 PROCEDURE — 83874 ASSAY OF MYOGLOBIN: CPT

## 2024-04-05 PROCEDURE — 80048 BASIC METABOLIC PNL TOTAL CA: CPT

## 2024-04-05 PROCEDURE — 36415 COLL VENOUS BLD VENIPUNCTURE: CPT

## 2024-04-05 PROCEDURE — 82947 ASSAY GLUCOSE BLOOD QUANT: CPT

## 2024-04-05 PROCEDURE — 2580000003 HC RX 258: Performed by: HOSPITALIST

## 2024-04-05 PROCEDURE — 6370000000 HC RX 637 (ALT 250 FOR IP): Performed by: NURSE PRACTITIONER

## 2024-04-05 PROCEDURE — 82550 ASSAY OF CK (CPK): CPT

## 2024-04-05 PROCEDURE — 6370000000 HC RX 637 (ALT 250 FOR IP): Performed by: HOSPITALIST

## 2024-04-05 PROCEDURE — 97535 SELF CARE MNGMENT TRAINING: CPT

## 2024-04-05 PROCEDURE — 6370000000 HC RX 637 (ALT 250 FOR IP): Performed by: PSYCHIATRY & NEUROLOGY

## 2024-04-05 RX ADMIN — QUETIAPINE FUMARATE 25 MG: 25 TABLET ORAL at 20:41

## 2024-04-05 RX ADMIN — APIXABAN 5 MG: 5 TABLET, FILM COATED ORAL at 09:22

## 2024-04-05 RX ADMIN — INSULIN GLARGINE 10 UNITS: 100 INJECTION, SOLUTION SUBCUTANEOUS at 09:22

## 2024-04-05 RX ADMIN — ATORVASTATIN CALCIUM 80 MG: 80 TABLET, FILM COATED ORAL at 20:42

## 2024-04-05 RX ADMIN — SODIUM CHLORIDE, PRESERVATIVE FREE 10 ML: 5 INJECTION INTRAVENOUS at 09:23

## 2024-04-05 RX ADMIN — DOXAZOSIN 4 MG: 4 TABLET ORAL at 20:47

## 2024-04-05 RX ADMIN — Medication 3 MG: at 20:42

## 2024-04-05 RX ADMIN — INSULIN LISPRO 5 UNITS: 100 INJECTION, SOLUTION INTRAVENOUS; SUBCUTANEOUS at 09:23

## 2024-04-05 RX ADMIN — INSULIN LISPRO 5 UNITS: 100 INJECTION, SOLUTION INTRAVENOUS; SUBCUTANEOUS at 18:30

## 2024-04-05 RX ADMIN — APIXABAN 5 MG: 5 TABLET, FILM COATED ORAL at 20:42

## 2024-04-05 RX ADMIN — LOSARTAN POTASSIUM 100 MG: 100 TABLET, FILM COATED ORAL at 09:21

## 2024-04-05 RX ADMIN — MEMANTINE 5 MG: 10 TABLET ORAL at 09:22

## 2024-04-05 RX ADMIN — SODIUM CHLORIDE, PRESERVATIVE FREE 10 ML: 5 INJECTION INTRAVENOUS at 20:49

## 2024-04-05 RX ADMIN — GLIPIZIDE 10 MG: 10 TABLET ORAL at 09:47

## 2024-04-05 RX ADMIN — INSULIN LISPRO 5 UNITS: 100 INJECTION, SOLUTION INTRAVENOUS; SUBCUTANEOUS at 12:59

## 2024-04-05 RX ADMIN — ASPIRIN 81 MG CHEWABLE TABLET 81 MG: 81 TABLET CHEWABLE at 09:21

## 2024-04-05 ASSESSMENT — PAIN SCALES - GENERAL: PAINLEVEL_OUTOF10: 0

## 2024-04-05 NOTE — CARE COORDINATION
Social work: VM from [facility]/CM from [facility] requested writer's fax number to send decision on appeal.   SW left two voicemails back to [facility] to proceed. Await call back.   # for [facility]: [phone] ext [phone].    UPDATE 16:25-c/b from [facility], she states appeal is still pending.  Dtr updated on above.  If denied for [facility], family may opt to take patient home with home care or possibly seeing is [facility] would reconsider as mentation has improved.

## 2024-04-06 LAB
ANION GAP SERPL CALCULATED.3IONS-SCNC: 9 MMOL/L (ref 9–17)
BASOPHILS # BLD: 0.09 K/UL (ref 0–0.2)
BASOPHILS NFR BLD: 1 % (ref 0–2)
BUN SERPL-MCNC: 22 MG/DL (ref 8–23)
BUN/CREAT SERPL: 22 (ref 9–20)
CALCIUM SERPL-MCNC: 8.9 MG/DL (ref 8.6–10.4)
CHLORIDE SERPL-SCNC: 102 MMOL/L (ref 98–107)
CK SERPL-CCNC: 44 U/L (ref 39–308)
CO2 SERPL-SCNC: 24 MMOL/L (ref 20–31)
CREAT SERPL-MCNC: 1 MG/DL (ref 0.7–1.2)
EOSINOPHIL # BLD: 0.52 K/UL (ref 0–0.44)
EOSINOPHILS RELATIVE PERCENT: 8 % (ref 1–4)
ERYTHROCYTE [DISTWIDTH] IN BLOOD BY AUTOMATED COUNT: 15.3 % (ref 11.8–14.4)
GFR SERPL CREATININE-BSD FRML MDRD: 75 ML/MIN/1.73M2
GLUCOSE BLD-MCNC: 103 MG/DL (ref 75–110)
GLUCOSE BLD-MCNC: 154 MG/DL (ref 75–110)
GLUCOSE BLD-MCNC: 182 MG/DL (ref 75–110)
GLUCOSE BLD-MCNC: 184 MG/DL (ref 75–110)
GLUCOSE SERPL-MCNC: 142 MG/DL (ref 70–99)
HCT VFR BLD AUTO: 36.9 % (ref 40.7–50.3)
HGB BLD-MCNC: 12 G/DL (ref 13–17)
IMM GRANULOCYTES # BLD AUTO: 0.02 K/UL (ref 0–0.3)
IMM GRANULOCYTES NFR BLD: 0 %
LYMPHOCYTES NFR BLD: 1.66 K/UL (ref 1.1–3.7)
LYMPHOCYTES RELATIVE PERCENT: 24 % (ref 24–43)
MCH RBC QN AUTO: 27.8 PG (ref 25.2–33.5)
MCHC RBC AUTO-ENTMCNC: 32.5 G/DL (ref 28.4–34.8)
MCV RBC AUTO: 85.4 FL (ref 82.6–102.9)
MONOCYTES NFR BLD: 0.72 K/UL (ref 0.1–1.2)
MONOCYTES NFR BLD: 10 % (ref 3–12)
MYOGLOBIN SERPL-MCNC: 46 NG/ML (ref 28–72)
NEUTROPHILS NFR BLD: 57 % (ref 36–65)
NEUTS SEG NFR BLD: 3.89 K/UL (ref 1.5–8.1)
NRBC BLD-RTO: 0 PER 100 WBC
PLATELET # BLD AUTO: 241 K/UL (ref 138–453)
PMV BLD AUTO: 9.7 FL (ref 8.1–13.5)
POTASSIUM SERPL-SCNC: 4.1 MMOL/L (ref 3.7–5.3)
RBC # BLD AUTO: 4.32 M/UL (ref 4.21–5.77)
RBC # BLD: ABNORMAL 10*6/UL
SODIUM SERPL-SCNC: 135 MMOL/L (ref 135–144)
WBC OTHER # BLD: 6.9 K/UL (ref 3.5–11.3)

## 2024-04-06 PROCEDURE — 6370000000 HC RX 637 (ALT 250 FOR IP): Performed by: FAMILY MEDICINE

## 2024-04-06 PROCEDURE — 97535 SELF CARE MNGMENT TRAINING: CPT

## 2024-04-06 PROCEDURE — 6370000000 HC RX 637 (ALT 250 FOR IP): Performed by: INTERNAL MEDICINE

## 2024-04-06 PROCEDURE — 85025 COMPLETE CBC W/AUTO DIFF WBC: CPT

## 2024-04-06 PROCEDURE — 94761 N-INVAS EAR/PLS OXIMETRY MLT: CPT

## 2024-04-06 PROCEDURE — 6370000000 HC RX 637 (ALT 250 FOR IP): Performed by: HOSPITALIST

## 2024-04-06 PROCEDURE — 2580000003 HC RX 258: Performed by: HOSPITALIST

## 2024-04-06 PROCEDURE — 82550 ASSAY OF CK (CPK): CPT

## 2024-04-06 PROCEDURE — 80048 BASIC METABOLIC PNL TOTAL CA: CPT

## 2024-04-06 PROCEDURE — 6370000000 HC RX 637 (ALT 250 FOR IP): Performed by: NURSE PRACTITIONER

## 2024-04-06 PROCEDURE — 83874 ASSAY OF MYOGLOBIN: CPT

## 2024-04-06 PROCEDURE — 36415 COLL VENOUS BLD VENIPUNCTURE: CPT

## 2024-04-06 PROCEDURE — 6370000000 HC RX 637 (ALT 250 FOR IP): Performed by: PSYCHIATRY & NEUROLOGY

## 2024-04-06 PROCEDURE — 2060000000 HC ICU INTERMEDIATE R&B

## 2024-04-06 PROCEDURE — 97530 THERAPEUTIC ACTIVITIES: CPT

## 2024-04-06 PROCEDURE — 82947 ASSAY GLUCOSE BLOOD QUANT: CPT

## 2024-04-06 RX ADMIN — ATORVASTATIN CALCIUM 80 MG: 80 TABLET, FILM COATED ORAL at 20:55

## 2024-04-06 RX ADMIN — ASPIRIN 81 MG CHEWABLE TABLET 81 MG: 81 TABLET CHEWABLE at 08:45

## 2024-04-06 RX ADMIN — INSULIN LISPRO 5 UNITS: 100 INJECTION, SOLUTION INTRAVENOUS; SUBCUTANEOUS at 08:44

## 2024-04-06 RX ADMIN — LOSARTAN POTASSIUM 100 MG: 100 TABLET, FILM COATED ORAL at 08:45

## 2024-04-06 RX ADMIN — SODIUM CHLORIDE, PRESERVATIVE FREE 10 ML: 5 INJECTION INTRAVENOUS at 08:45

## 2024-04-06 RX ADMIN — QUETIAPINE FUMARATE 25 MG: 25 TABLET ORAL at 20:55

## 2024-04-06 RX ADMIN — INSULIN LISPRO 5 UNITS: 100 INJECTION, SOLUTION INTRAVENOUS; SUBCUTANEOUS at 17:20

## 2024-04-06 RX ADMIN — MEMANTINE 5 MG: 10 TABLET ORAL at 08:45

## 2024-04-06 RX ADMIN — APIXABAN 5 MG: 5 TABLET, FILM COATED ORAL at 08:45

## 2024-04-06 RX ADMIN — DOXAZOSIN 4 MG: 4 TABLET ORAL at 20:55

## 2024-04-06 RX ADMIN — APIXABAN 5 MG: 5 TABLET, FILM COATED ORAL at 20:55

## 2024-04-06 RX ADMIN — SENNOSIDES 8.6 MG: 8.6 TABLET, FILM COATED ORAL at 19:31

## 2024-04-06 RX ADMIN — INSULIN GLARGINE 10 UNITS: 100 INJECTION, SOLUTION SUBCUTANEOUS at 08:44

## 2024-04-06 RX ADMIN — SODIUM CHLORIDE, PRESERVATIVE FREE 10 ML: 5 INJECTION INTRAVENOUS at 20:59

## 2024-04-06 RX ADMIN — Medication 3 MG: at 20:55

## 2024-04-06 RX ADMIN — GLIPIZIDE 10 MG: 10 TABLET ORAL at 08:45

## 2024-04-06 RX ADMIN — ACETAMINOPHEN 650 MG: 325 TABLET ORAL at 03:28

## 2024-04-06 ASSESSMENT — PAIN DESCRIPTION - LOCATION: LOCATION: SHOULDER

## 2024-04-06 ASSESSMENT — PAIN DESCRIPTION - ORIENTATION: ORIENTATION: LEFT

## 2024-04-06 ASSESSMENT — PAIN SCALES - GENERAL
PAINLEVEL_OUTOF10: 3
PAINLEVEL_OUTOF10: 0
PAINLEVEL_OUTOF10: 0

## 2024-04-06 ASSESSMENT — PAIN SCALES - WONG BAKER: WONGBAKER_NUMERICALRESPONSE: NO HURT

## 2024-04-06 ASSESSMENT — PAIN DESCRIPTION - DESCRIPTORS: DESCRIPTORS: DISCOMFORT

## 2024-04-07 VITALS
TEMPERATURE: 97.7 F | RESPIRATION RATE: 16 BRPM | DIASTOLIC BLOOD PRESSURE: 57 MMHG | HEIGHT: 72 IN | SYSTOLIC BLOOD PRESSURE: 137 MMHG | HEART RATE: 65 BPM | BODY MASS INDEX: 35.54 KG/M2 | OXYGEN SATURATION: 94 % | WEIGHT: 262.4 LBS

## 2024-04-07 LAB
ANION GAP SERPL CALCULATED.3IONS-SCNC: 8 MMOL/L (ref 9–17)
BASOPHILS # BLD: 0.1 K/UL (ref 0–0.2)
BASOPHILS NFR BLD: 1 % (ref 0–2)
BUN SERPL-MCNC: 23 MG/DL (ref 8–23)
BUN/CREAT SERPL: 23 (ref 9–20)
CALCIUM SERPL-MCNC: 8.8 MG/DL (ref 8.6–10.4)
CHLORIDE SERPL-SCNC: 101 MMOL/L (ref 98–107)
CK SERPL-CCNC: 32 U/L (ref 39–308)
CO2 SERPL-SCNC: 25 MMOL/L (ref 20–31)
CREAT SERPL-MCNC: 1 MG/DL (ref 0.7–1.2)
EOSINOPHIL # BLD: 0.41 K/UL (ref 0–0.44)
EOSINOPHILS RELATIVE PERCENT: 4 % (ref 1–4)
ERYTHROCYTE [DISTWIDTH] IN BLOOD BY AUTOMATED COUNT: 15.4 % (ref 11.8–14.4)
GFR SERPL CREATININE-BSD FRML MDRD: 75 ML/MIN/1.73M2
GLUCOSE BLD-MCNC: 164 MG/DL (ref 75–110)
GLUCOSE BLD-MCNC: 196 MG/DL (ref 75–110)
GLUCOSE SERPL-MCNC: 140 MG/DL (ref 70–99)
HCT VFR BLD AUTO: 37.1 % (ref 40.7–50.3)
HGB BLD-MCNC: 12 G/DL (ref 13–17)
IMM GRANULOCYTES # BLD AUTO: 0.04 K/UL (ref 0–0.3)
IMM GRANULOCYTES NFR BLD: 0 %
LYMPHOCYTES NFR BLD: 1.21 K/UL (ref 1.1–3.7)
LYMPHOCYTES RELATIVE PERCENT: 12 % (ref 24–43)
MCH RBC QN AUTO: 27.8 PG (ref 25.2–33.5)
MCHC RBC AUTO-ENTMCNC: 32.3 G/DL (ref 28.4–34.8)
MCV RBC AUTO: 85.9 FL (ref 82.6–102.9)
MONOCYTES NFR BLD: 0.84 K/UL (ref 0.1–1.2)
MONOCYTES NFR BLD: 8 % (ref 3–12)
MYOGLOBIN SERPL-MCNC: 29 NG/ML (ref 28–72)
NEUTROPHILS NFR BLD: 75 % (ref 36–65)
NEUTS SEG NFR BLD: 7.36 K/UL (ref 1.5–8.1)
NRBC BLD-RTO: 0 PER 100 WBC
PLATELET # BLD AUTO: 231 K/UL (ref 138–453)
PMV BLD AUTO: 9.5 FL (ref 8.1–13.5)
POTASSIUM SERPL-SCNC: 4.1 MMOL/L (ref 3.7–5.3)
RBC # BLD AUTO: 4.32 M/UL (ref 4.21–5.77)
RBC # BLD: ABNORMAL 10*6/UL
SODIUM SERPL-SCNC: 134 MMOL/L (ref 135–144)
WBC OTHER # BLD: 10 K/UL (ref 3.5–11.3)

## 2024-04-07 PROCEDURE — 6370000000 HC RX 637 (ALT 250 FOR IP): Performed by: PSYCHIATRY & NEUROLOGY

## 2024-04-07 PROCEDURE — 80048 BASIC METABOLIC PNL TOTAL CA: CPT

## 2024-04-07 PROCEDURE — 6370000000 HC RX 637 (ALT 250 FOR IP): Performed by: HOSPITALIST

## 2024-04-07 PROCEDURE — 6370000000 HC RX 637 (ALT 250 FOR IP): Performed by: INTERNAL MEDICINE

## 2024-04-07 PROCEDURE — 82550 ASSAY OF CK (CPK): CPT

## 2024-04-07 PROCEDURE — 36415 COLL VENOUS BLD VENIPUNCTURE: CPT

## 2024-04-07 PROCEDURE — 83874 ASSAY OF MYOGLOBIN: CPT

## 2024-04-07 PROCEDURE — 82947 ASSAY GLUCOSE BLOOD QUANT: CPT

## 2024-04-07 PROCEDURE — 85025 COMPLETE CBC W/AUTO DIFF WBC: CPT

## 2024-04-07 PROCEDURE — 2580000003 HC RX 258: Performed by: HOSPITALIST

## 2024-04-07 PROCEDURE — 94761 N-INVAS EAR/PLS OXIMETRY MLT: CPT

## 2024-04-07 RX ADMIN — SODIUM CHLORIDE, PRESERVATIVE FREE 10 ML: 5 INJECTION INTRAVENOUS at 09:54

## 2024-04-07 RX ADMIN — INSULIN GLARGINE 10 UNITS: 100 INJECTION, SOLUTION SUBCUTANEOUS at 09:53

## 2024-04-07 RX ADMIN — INSULIN LISPRO 5 UNITS: 100 INJECTION, SOLUTION INTRAVENOUS; SUBCUTANEOUS at 09:53

## 2024-04-07 RX ADMIN — MEMANTINE 5 MG: 10 TABLET ORAL at 09:53

## 2024-04-07 RX ADMIN — INSULIN LISPRO 5 UNITS: 100 INJECTION, SOLUTION INTRAVENOUS; SUBCUTANEOUS at 13:18

## 2024-04-07 RX ADMIN — APIXABAN 5 MG: 5 TABLET, FILM COATED ORAL at 09:53

## 2024-04-07 RX ADMIN — ASPIRIN 81 MG CHEWABLE TABLET 81 MG: 81 TABLET CHEWABLE at 09:53

## 2024-04-07 RX ADMIN — LOSARTAN POTASSIUM 100 MG: 100 TABLET, FILM COATED ORAL at 09:53

## 2024-04-07 RX ADMIN — GLIPIZIDE 10 MG: 10 TABLET ORAL at 09:55

## 2024-04-07 NOTE — CARE COORDINATION
Discharge planning    Spoke with Ana Laura at [facility] and they received auth. Writer notified the daughter, Sudha and scheduled transport through [facility] for 1515 today.   RN has report number to call report.

## 2024-04-07 NOTE — PLAN OF CARE
Problem: Safety - Adult  Goal: Free from fall injury  3/26/2024 1303 by Malgorzata Walters RN  Outcome: Progressing  3/26/2024 0637 by Yara Newell RN  Outcome: Progressing  Flowsheets (Taken 3/25/2024 2000)  Free From Fall Injury: Instruct family/caregiver on patient safety     Problem: Chronic Conditions and Co-morbidities  Goal: Patient's chronic conditions and co-morbidity symptoms are monitored and maintained or improved  3/26/2024 1303 by Malgorzata Walters RN  Outcome: Progressing  3/26/2024 0637 by Yara Newell RN  Outcome: Progressing  Flowsheets (Taken 3/25/2024 2000)  Care Plan - Patient's Chronic Conditions and Co-Morbidity Symptoms are Monitored and Maintained or Improved:   Collaborate with multidisciplinary team to address chronic and comorbid conditions and prevent exacerbation or deterioration   Monitor and assess patient's chronic conditions and comorbid symptoms for stability, deterioration, or improvement     Problem: Discharge Planning  Goal: Discharge to home or other facility with appropriate resources  3/26/2024 1303 by Malgorzata Walters RN  Outcome: Progressing  3/26/2024 0637 by Yara Newell RN  Outcome: Progressing  Flowsheets (Taken 3/25/2024 2000)  Discharge to home or other facility with appropriate resources:   Identify barriers to discharge with patient and caregiver   Arrange for needed discharge resources and transportation as appropriate   Identify discharge learning needs (meds, wound care, etc)     Problem: Skin/Tissue Integrity  Goal: Absence of new skin breakdown  Description: 1.  Monitor for areas of redness and/or skin breakdown  2.  Assess vascular access sites hourly  3.  Every 4-6 hours minimum:  Change oxygen saturation probe site  4.  Every 4-6 hours:  If on nasal continuous positive airway pressure, respiratory therapy assess nares and determine need for appliance change or resting period.  3/26/2024 1303 by Malgorzata Walters, RN  Outcome: Progressing  3/26/2024 0637 by 
  Problem: Safety - Adult  Goal: Free from fall injury  3/27/2024 0215 by Dora Wei RN  Outcome: Progressing  3/26/2024 1303 by Malgorzata Walters RN  Outcome: Progressing     Problem: Chronic Conditions and Co-morbidities  Goal: Patient's chronic conditions and co-morbidity symptoms are monitored and maintained or improved  3/27/2024 0215 by Dora Wei RN  Outcome: Progressing  3/26/2024 1303 by Malgorzata Walters RN  Outcome: Progressing     Problem: Discharge Planning  Goal: Discharge to home or other facility with appropriate resources  3/27/2024 0215 by Dora Wei RN  Outcome: Progressing  3/26/2024 1303 by Malgorzata Walters RN  Outcome: Progressing     Problem: Skin/Tissue Integrity  Goal: Absence of new skin breakdown  Description: 1.  Monitor for areas of redness and/or skin breakdown  2.  Assess vascular access sites hourly  3.  Every 4-6 hours minimum:  Change oxygen saturation probe site  4.  Every 4-6 hours:  If on nasal continuous positive airway pressure, respiratory therapy assess nares and determine need for appliance change or resting period.  3/27/2024 0215 by Dora Wei RN  Outcome: Progressing  3/26/2024 1303 by Malgorzata Walters RN  Outcome: Progressing     Problem: Neurosensory - Adult  Goal: Achieves maximal functionality and self care  3/27/2024 0215 by Dora Wei RN  Outcome: Progressing  3/26/2024 1303 by Malgorzata Walters RN  Outcome: Progressing     Problem: Confusion  Goal: Confusion, delirium, dementia, or psychosis is improved or at baseline  Description: INTERVENTIONS:  1. Assess for possible contributors to thought disturbance, including medications, impaired vision or hearing, underlying metabolic abnormalities, dehydration, psychiatric diagnoses, and notify attending LIP  2. [place] high risk fall precautions, as indicated  3. Provide frequent short contacts to provide reality reorientation, refocusing and direction  4. Decrease environmental stimuli, 
  Problem: Safety - Adult  Goal: Free from fall injury  3/30/2024 0011 by Tereza Dunn RN  Outcome: Progressing  Flowsheets (Taken 3/29/2024 2000)  Free From Fall Injury: Instruct family/caregiver on patient safety     Problem: Chronic Conditions and Co-morbidities  Goal: Patient's chronic conditions and co-morbidity symptoms are monitored and maintained or improved  3/30/2024 0011 by Tereza Dunn RN  Outcome: Progressing  Flowsheets (Taken 3/29/2024 2000)  Care Plan - Patient's Chronic Conditions and Co-Morbidity Symptoms are Monitored and Maintained or Improved:   Monitor and assess patient's chronic conditions and comorbid symptoms for stability, deterioration, or improvement   Collaborate with multidisciplinary team to address chronic and comorbid conditions and prevent exacerbation or deterioration   Update acute care plan with appropriate goals if chronic or comorbid symptoms are exacerbated and prevent overall improvement and discharge     Problem: Discharge Planning  Goal: Discharge to home or other facility with appropriate resources  3/30/2024 0011 by Tereza Dunn RN  Outcome: Progressing  Flowsheets (Taken 3/29/2024 2000)  Discharge to home or other facility with appropriate resources:   Identify barriers to discharge with patient and caregiver   Arrange for needed discharge resources and transportation as appropriate   Identify discharge learning needs (meds, wound care, etc)   Refer to discharge planning if patient needs post-hospital services based on physician order or complex needs related to functional status, cognitive ability or social support system     Problem: Skin/Tissue Integrity  Goal: Absence of new skin breakdown  Description: 1.  Monitor for areas of redness and/or skin breakdown  2.  Assess vascular access sites hourly  3.  Every 4-6 hours minimum:  Change oxygen saturation probe site  4.  Every 4-6 hours:  If on nasal continuous positive airway pressure, respiratory 
  Problem: Safety - Adult  Goal: Free from fall injury  4/3/2024 0731 by Jonathan Shin RN  Outcome: Progressing  4/2/2024 1925 by Laurie Valle RN  Outcome: Progressing     Problem: Chronic Conditions and Co-morbidities  Goal: Patient's chronic conditions and co-morbidity symptoms are monitored and maintained or improved  4/3/2024 0731 by Jonathan Shin RN  Outcome: Progressing  Flowsheets (Taken 4/2/2024 2000)  Care Plan - Patient's Chronic Conditions and Co-Morbidity Symptoms are Monitored and Maintained or Improved: Monitor and assess patient's chronic conditions and comorbid symptoms for stability, deterioration, or improvement  4/2/2024 1925 by Laurie Valle RN  Outcome: Progressing     Problem: Discharge Planning  Goal: Discharge to home or other facility with appropriate resources  4/3/2024 0731 by Jonathan Shin RN  Outcome: Progressing  Flowsheets (Taken 4/2/2024 2000)  Discharge to home or other facility with appropriate resources: Identify barriers to discharge with patient and caregiver  4/2/2024 1925 by Laurie Valle RN  Outcome: Progressing     Problem: Skin/Tissue Integrity  Goal: Absence of new skin breakdown  Description: 1.  Monitor for areas of redness and/or skin breakdown  2.  Assess vascular access sites hourly  3.  Every 4-6 hours minimum:  Change oxygen saturation probe site  4.  Every 4-6 hours:  If on nasal continuous positive airway pressure, respiratory therapy assess nares and determine need for appliance change or resting period.  4/3/2024 0731 by Jonathan Shin RN  Outcome: Progressing  4/2/2024 1925 by Laurie Valle RN  Outcome: Progressing     Problem: Confusion  Goal: Confusion, delirium, dementia, or psychosis is improved or at baseline  Description: INTERVENTIONS:  1. Assess for possible contributors to thought disturbance, including medications, impaired vision or hearing, underlying metabolic abnormalities, dehydration, psychiatric diagnoses, and 
  Problem: Safety - Adult  Goal: Free from fall injury  4/5/2024 1023 by Leslie Llanes RN  Outcome: Progressing  Flowsheets (Taken 4/5/2024 0800)  Free From Fall Injury:   Instruct family/caregiver on patient safety   Based on caregiver fall risk screen, instruct family/caregiver to ask for assistance with transferring infant if caregiver noted to have fall risk factors  4/5/2024 0238 by Oneil Gant, RN  Outcome: Progressing     Problem: Chronic Conditions and Co-morbidities  Goal: Patient's chronic conditions and co-morbidity symptoms are monitored and maintained or improved  4/5/2024 1023 by Leslie Llanes RN  Outcome: Progressing  Flowsheets (Taken 4/5/2024 0800)  Care Plan - Patient's Chronic Conditions and Co-Morbidity Symptoms are Monitored and Maintained or Improved:   Monitor and assess patient's chronic conditions and comorbid symptoms for stability, deterioration, or improvement   Collaborate with multidisciplinary team to address chronic and comorbid conditions and prevent exacerbation or deterioration   Update acute care plan with appropriate goals if chronic or comorbid symptoms are exacerbated and prevent overall improvement and discharge  4/5/2024 0238 by Oneil Gant, RN  Outcome: Progressing  Flowsheets (Taken 4/4/2024 2000)  Care Plan - Patient's Chronic Conditions and Co-Morbidity Symptoms are Monitored and Maintained or Improved: Monitor and assess patient's chronic conditions and comorbid symptoms for stability, deterioration, or improvement     Problem: Discharge Planning  Goal: Discharge to home or other facility with appropriate resources  4/5/2024 1023 by Leslie Llanes RN  Outcome: Progressing  Flowsheets (Taken 4/5/2024 0800)  Discharge to home or other facility with appropriate resources:   Identify barriers to discharge with patient and caregiver   Arrange for needed discharge resources and transportation as appropriate   Identify discharge learning needs (meds, wound 
  Problem: Safety - Adult  Goal: Free from fall injury  4/5/2024 2347 by Stalin Cavanaugh RN  Outcome: Progressing  4/5/2024 1023 by Leslie Llanes RN  Outcome: Progressing  Flowsheets (Taken 4/5/2024 0800)  Free From Fall Injury:   Instruct family/caregiver on patient safety   Based on caregiver fall risk screen, instruct family/caregiver to ask for assistance with transferring infant if caregiver noted to have fall risk factors     Problem: Chronic Conditions and Co-morbidities  Goal: Patient's chronic conditions and co-morbidity symptoms are monitored and maintained or improved  4/5/2024 2347 by Stalin Cavanaugh RN  Outcome: Progressing  4/5/2024 1023 by Leslie Llanes RN  Outcome: Progressing  Flowsheets (Taken 4/5/2024 0800)  Care Plan - Patient's Chronic Conditions and Co-Morbidity Symptoms are Monitored and Maintained or Improved:   Monitor and assess patient's chronic conditions and comorbid symptoms for stability, deterioration, or improvement   Collaborate with multidisciplinary team to address chronic and comorbid conditions and prevent exacerbation or deterioration   Update acute care plan with appropriate goals if chronic or comorbid symptoms are exacerbated and prevent overall improvement and discharge     Problem: Discharge Planning  Goal: Discharge to home or other facility with appropriate resources  4/5/2024 2347 by Stalin Cavanaugh RN  Outcome: Progressing  4/5/2024 1023 by Leslie Llanes RN  Outcome: Progressing  Flowsheets (Taken 4/5/2024 0800)  Discharge to home or other facility with appropriate resources:   Identify barriers to discharge with patient and caregiver   Arrange for needed discharge resources and transportation as appropriate   Identify discharge learning needs (meds, wound care, etc)   Refer to discharge planning if patient needs post-hospital services based on physician order or complex needs related to functional status, cognitive ability or social support system   
  Problem: Safety - Adult  Goal: Free from fall injury  Outcome: Progressing     Problem: Chronic Conditions and Co-morbidities  Goal: Patient's chronic conditions and co-morbidity symptoms are monitored and maintained or improved  Outcome: Progressing     Problem: Discharge Planning  Goal: Discharge to home or other facility with appropriate resources  Outcome: Progressing     Problem: Skin/Tissue Integrity  Goal: Absence of new skin breakdown  Description: 1.  Monitor for areas of redness and/or skin breakdown  2.  Assess vascular access sites hourly  3.  Every 4-6 hours minimum:  Change oxygen saturation probe site  4.  Every 4-6 hours:  If on nasal continuous positive airway pressure, respiratory therapy assess nares and determine need for appliance change or resting period.  Outcome: Progressing     Problem: Neurosensory - Adult  Goal: Achieves maximal functionality and self care  Outcome: Progressing     Problem: Confusion  Goal: Confusion, delirium, dementia, or psychosis is improved or at baseline  Description: INTERVENTIONS:  1. Assess for possible contributors to thought disturbance, including medications, impaired vision or hearing, underlying metabolic abnormalities, dehydration, psychiatric diagnoses, and notify attending LIP  2. [place] high risk fall precautions, as indicated  3. Provide frequent short contacts to provide reality reorientation, refocusing and direction  4. Decrease environmental stimuli, including noise as appropriate  5. Monitor and intervene to maintain adequate nutrition, hydration, elimination, sleep and activity  6. If unable to ensure safety without constant attention obtain sitter and review sitter guidelines with assigned personnel  7. Initiate Psychosocial CNS and Spiritual Care consult, as indicated  Outcome: Progressing     Problem: Cardiovascular - Adult  Goal: Maintains optimal cardiac output and hemodynamic stability  Outcome: Progressing     Problem: Metabolic/Fluid 
  Problem: Safety - Adult  Goal: Free from fall injury  Outcome: Progressing     Problem: Chronic Conditions and Co-morbidities  Goal: Patient's chronic conditions and co-morbidity symptoms are monitored and maintained or improved  Outcome: Progressing     Problem: Discharge Planning  Goal: Discharge to home or other facility with appropriate resources  Outcome: Progressing     Problem: Skin/Tissue Integrity  Goal: Absence of new skin breakdown  Description: 1.  Monitor for areas of redness and/or skin breakdown  2.  Assess vascular access sites hourly  3.  Every 4-6 hours minimum:  Change oxygen saturation probe site  4.  Every 4-6 hours:  If on nasal continuous positive airway pressure, respiratory therapy assess nares and determine need for appliance change or resting period.  Outcome: Progressing     Problem: Neurosensory - Adult  Goal: Achieves maximal functionality and self care  Outcome: Progressing     Problem: Confusion  Goal: Confusion, delirium, dementia, or psychosis is improved or at baseline  Description: INTERVENTIONS:  1. Assess for possible contributors to thought disturbance, including medications, impaired vision or hearing, underlying metabolic abnormalities, dehydration, psychiatric diagnoses, and notify attending LIP  2. [place] high risk fall precautions, as indicated  3. Provide frequent short contacts to provide reality reorientation, refocusing and direction  4. Decrease environmental stimuli, including noise as appropriate  5. Monitor and intervene to maintain adequate nutrition, hydration, elimination, sleep and activity  6. If unable to ensure safety without constant attention obtain sitter and review sitter guidelines with assigned personnel  7. Initiate Psychosocial CNS and Spiritual Care consult, as indicated  Outcome: Progressing     Problem: Cardiovascular - Adult  Goal: Maintains optimal cardiac output and hemodynamic stability  Outcome: Progressing  Goal: Absence of cardiac 
  Problem: Safety - Adult  Goal: Free from fall injury  Outcome: Progressing     Problem: Chronic Conditions and Co-morbidities  Goal: Patient's chronic conditions and co-morbidity symptoms are monitored and maintained or improved  Outcome: Progressing     Problem: Discharge Planning  Goal: Discharge to home or other facility with appropriate resources  Outcome: Progressing     Problem: Skin/Tissue Integrity  Goal: Absence of new skin breakdown  Description: 1.  Monitor for areas of redness and/or skin breakdown  2.  Assess vascular access sites hourly  3.  Every 4-6 hours minimum:  Change oxygen saturation probe site  4.  Every 4-6 hours:  If on nasal continuous positive airway pressure, respiratory therapy assess nares and determine need for appliance change or resting period.  Outcome: Progressing     Problem: Neurosensory - Adult  Goal: Achieves maximal functionality and self care  Outcome: Progressing     Problem: Confusion  Goal: Confusion, delirium, dementia, or psychosis is improved or at baseline  Description: INTERVENTIONS:  1. Assess for possible contributors to thought disturbance, including medications, impaired vision or hearing, underlying metabolic abnormalities, dehydration, psychiatric diagnoses, and notify attending LIP  2. [place] high risk fall precautions, as indicated  3. Provide frequent short contacts to provide reality reorientation, refocusing and direction  4. Decrease environmental stimuli, including noise as appropriate  5. Monitor and intervene to maintain adequate nutrition, hydration, elimination, sleep and activity  6. If unable to ensure safety without constant attention obtain sitter and review sitter guidelines with assigned personnel  7. Initiate Psychosocial CNS and Spiritual Care consult, as indicated  Outcome: Progressing     Problem: Cardiovascular - Adult  Goal: Maintains optimal cardiac output and hemodynamic stability  Outcome: Progressing  Goal: Absence of cardiac 
  Problem: Safety - Adult  Goal: Free from fall injury  Outcome: Progressing     Problem: Chronic Conditions and Co-morbidities  Goal: Patient's chronic conditions and co-morbidity symptoms are monitored and maintained or improved  Outcome: Progressing     Problem: Discharge Planning  Goal: Discharge to home or other facility with appropriate resources  Outcome: Progressing     Problem: Skin/Tissue Integrity  Goal: Absence of new skin breakdown  Description: 1.  Monitor for areas of redness and/or skin breakdown  2.  Assess vascular access sites hourly  3.  Every 4-6 hours minimum:  Change oxygen saturation probe site  4.  Every 4-6 hours:  If on nasal continuous positive airway pressure, respiratory therapy assess nares and determine need for appliance change or resting period.  Outcome: Progressing     Problem: Neurosensory - Adult  Goal: Achieves maximal functionality and self care  Outcome: Progressing     Problem: Confusion  Goal: Confusion, delirium, dementia, or psychosis is improved or at baseline  Description: INTERVENTIONS:  1. Assess for possible contributors to thought disturbance, including medications, impaired vision or hearing, underlying metabolic abnormalities, dehydration, psychiatric diagnoses, and notify attending LIP  2. [place] high risk fall precautions, as indicated  3. Provide frequent short contacts to provide reality reorientation, refocusing and direction  4. Decrease environmental stimuli, including noise as appropriate  5. Monitor and intervene to maintain adequate nutrition, hydration, elimination, sleep and activity  6. If unable to ensure safety without constant attention obtain sitter and review sitter guidelines with assigned personnel  7. Initiate Psychosocial CNS and Spiritual Care consult, as indicated  Outcome: Progressing     Problem: Cardiovascular - Adult  Goal: Maintains optimal cardiac output and hemodynamic stability  Outcome: Progressing  Goal: Absence of cardiac 
  Problem: Safety - Adult  Goal: Free from fall injury  Outcome: Progressing     Problem: Chronic Conditions and Co-morbidities  Goal: Patient's chronic conditions and co-morbidity symptoms are monitored and maintained or improved  Outcome: Progressing     Problem: Discharge Planning  Goal: Discharge to home or other facility with appropriate resources  Outcome: Progressing     Problem: Skin/Tissue Integrity  Goal: Absence of new skin breakdown  Description: 1.  Monitor for areas of redness and/or skin breakdown  2.  Assess vascular access sites hourly  3.  Every 4-6 hours minimum:  Change oxygen saturation probe site  4.  Every 4-6 hours:  If on nasal continuous positive airway pressure, respiratory therapy assess nares and determine need for appliance change or resting period.  Outcome: Progressing     Problem: Neurosensory - Adult  Goal: Achieves maximal functionality and self care  Outcome: Progressing     Problem: Confusion  Goal: Confusion, delirium, dementia, or psychosis is improved or at baseline  Description: INTERVENTIONS:  1. Assess for possible contributors to thought disturbance, including medications, impaired vision or hearing, underlying metabolic abnormalities, dehydration, psychiatric diagnoses, and notify attending LIP  2. [place] high risk fall precautions, as indicated  3. Provide frequent short contacts to provide reality reorientation, refocusing and direction  4. Decrease environmental stimuli, including noise as appropriate  5. Monitor and intervene to maintain adequate nutrition, hydration, elimination, sleep and activity  6. If unable to ensure safety without constant attention obtain sitter and review sitter guidelines with assigned personnel  7. Initiate Psychosocial CNS and Spiritual Care consult, as indicated  Outcome: Progressing     Problem: Cardiovascular - Adult  Goal: Maintains optimal cardiac output and hemodynamic stability  Outcome: Progressing     Problem: Musculoskeletal - 
  Problem: Safety - Adult  Goal: Free from fall injury  Outcome: Progressing     Problem: Chronic Conditions and Co-morbidities  Goal: Patient's chronic conditions and co-morbidity symptoms are monitored and maintained or improved  Outcome: Progressing     Problem: Discharge Planning  Goal: Discharge to home or other facility with appropriate resources  Outcome: Progressing     Problem: Skin/Tissue Integrity  Goal: Absence of new skin breakdown  Description: 1.  Monitor for areas of redness and/or skin breakdown  2.  Assess vascular access sites hourly  3.  Every 4-6 hours minimum:  Change oxygen saturation probe site  4.  Every 4-6 hours:  If on nasal continuous positive airway pressure, respiratory therapy assess nares and determine need for appliance change or resting period.  Outcome: Progressing     Problem: Neurosensory - Adult  Goal: Achieves maximal functionality and self care  Outcome: Progressing     Problem: Confusion  Goal: Confusion, delirium, dementia, or psychosis is improved or at baseline  Description: INTERVENTIONS:  1. Assess for possible contributors to thought disturbance, including medications, impaired vision or hearing, underlying metabolic abnormalities, dehydration, psychiatric diagnoses, and notify attending LIP  2. [place] high risk fall precautions, as indicated  3. Provide frequent short contacts to provide reality reorientation, refocusing and direction  4. Decrease environmental stimuli, including noise as appropriate  5. Monitor and intervene to maintain adequate nutrition, hydration, elimination, sleep and activity  6. If unable to ensure safety without constant attention obtain sitter and review sitter guidelines with assigned personnel  7. Initiate Psychosocial CNS and Spiritual Care consult, as indicated  Outcome: Progressing     Problem: Cardiovascular - Adult  Goal: Maintains optimal cardiac output and hemodynamic stability  Outcome: Progressing  Goal: Absence of cardiac 
  Problem: Safety - Adult  Goal: Free from fall injury  Outcome: Progressing     Problem: Chronic Conditions and Co-morbidities  Goal: Patient's chronic conditions and co-morbidity symptoms are monitored and maintained or improved  Outcome: Progressing  Flowsheets (Taken 3/24/2024 2000)  Care Plan - Patient's Chronic Conditions and Co-Morbidity Symptoms are Monitored and Maintained or Improved:   Monitor and assess patient's chronic conditions and comorbid symptoms for stability, deterioration, or improvement   Collaborate with multidisciplinary team to address chronic and comorbid conditions and prevent exacerbation or deterioration   Update acute care plan with appropriate goals if chronic or comorbid symptoms are exacerbated and prevent overall improvement and discharge     Problem: Discharge Planning  Goal: Discharge to home or other facility with appropriate resources  Outcome: Progressing  Flowsheets (Taken 3/24/2024 2000)  Discharge to home or other facility with appropriate resources:   Identify barriers to discharge with patient and caregiver   Arrange for needed discharge resources and transportation as appropriate   Identify discharge learning needs (meds, wound care, etc)   Refer to discharge planning if patient needs post-hospital services based on physician order or complex needs related to functional status, cognitive ability or social support system     Problem: Skin/Tissue Integrity  Goal: Absence of new skin breakdown  Description: 1.  Monitor for areas of redness and/or skin breakdown  2.  Assess vascular access sites hourly  3.  Every 4-6 hours minimum:  Change oxygen saturation probe site  4.  Every 4-6 hours:  If on nasal continuous positive airway pressure, respiratory therapy assess nares and determine need for appliance change or resting period.  Outcome: Progressing     Problem: Neurosensory - Adult  Goal: Achieves maximal functionality and self care  Outcome: Progressing     
  Problem: Safety - Adult  Goal: Free from fall injury  Outcome: Progressing     Problem: Chronic Conditions and Co-morbidities  Goal: Patient's chronic conditions and co-morbidity symptoms are monitored and maintained or improved  Outcome: Progressing  Flowsheets (Taken 4/4/2024 2000)  Care Plan - Patient's Chronic Conditions and Co-Morbidity Symptoms are Monitored and Maintained or Improved: Monitor and assess patient's chronic conditions and comorbid symptoms for stability, deterioration, or improvement     Problem: Discharge Planning  Goal: Discharge to home or other facility with appropriate resources  Outcome: Progressing  Flowsheets (Taken 4/4/2024 2000)  Discharge to home or other facility with appropriate resources: Identify barriers to discharge with patient and caregiver     Problem: Skin/Tissue Integrity  Goal: Absence of new skin breakdown  Description: 1.  Monitor for areas of redness and/or skin breakdown  2.  Assess vascular access sites hourly  3.  Every 4-6 hours minimum:  Change oxygen saturation probe site  4.  Every 4-6 hours:  If on nasal continuous positive airway pressure, respiratory therapy assess nares and determine need for appliance change or resting period.  Outcome: Progressing     Problem: Neurosensory - Adult  Goal: Achieves maximal functionality and self care  Outcome: Progressing  Flowsheets (Taken 4/4/2024 2000)  Achieves maximal functionality and self care: Monitor swallowing and airway patency with patient fatigue and changes in neurological status     Problem: Confusion  Goal: Confusion, delirium, dementia, or psychosis is improved or at baseline  Description: INTERVENTIONS:  1. Assess for possible contributors to thought disturbance, including medications, impaired vision or hearing, underlying metabolic abnormalities, dehydration, psychiatric diagnoses, and notify attending LIP  2. [place] high risk fall precautions, as indicated  3. Provide frequent short contacts to 
  Problem: Safety - Adult  Goal: Free from fall injury  Outcome: Progressing     Problem: Chronic Conditions and Co-morbidities  Goal: Patient's chronic conditions and co-morbidity symptoms are monitored and maintained or improved  Outcome: Progressing  Flowsheets (Taken 4/6/2024 0800)  Care Plan - Patient's Chronic Conditions and Co-Morbidity Symptoms are Monitored and Maintained or Improved: Monitor and assess patient's chronic conditions and comorbid symptoms for stability, deterioration, or improvement     Problem: Discharge Planning  Goal: Discharge to home or other facility with appropriate resources  Outcome: Progressing  Flowsheets (Taken 4/6/2024 0800)  Discharge to home or other facility with appropriate resources: Identify barriers to discharge with patient and caregiver     Problem: Skin/Tissue Integrity  Goal: Absence of new skin breakdown  Description: 1.  Monitor for areas of redness and/or skin breakdown  2.  Assess vascular access sites hourly  3.  Every 4-6 hours minimum:  Change oxygen saturation probe site  4.  Every 4-6 hours:  If on nasal continuous positive airway pressure, respiratory therapy assess nares and determine need for appliance change or resting period.  Outcome: Progressing     Problem: Neurosensory - Adult  Goal: Achieves maximal functionality and self care  Outcome: Progressing  Flowsheets (Taken 4/6/2024 0800)  Achieves maximal functionality and self care: Monitor swallowing and airway patency with patient fatigue and changes in neurological status     Problem: Confusion  Goal: Confusion, delirium, dementia, or psychosis is improved or at baseline  Description: INTERVENTIONS:  1. Assess for possible contributors to thought disturbance, including medications, impaired vision or hearing, underlying metabolic abnormalities, dehydration, psychiatric diagnoses, and notify attending LIP  2. [place] high risk fall precautions, as indicated  3. Provide frequent short contacts to 
  Problem: Safety - Adult  Goal: Free from fall injury  Outcome: Progressing  Flowsheets (Taken 3/23/2024 2141)  Free From Fall Injury: Instruct family/caregiver on patient safety     Problem: Chronic Conditions and Co-morbidities  Goal: Patient's chronic conditions and co-morbidity symptoms are monitored and maintained or improved  Outcome: Progressing  Flowsheets (Taken 3/23/2024 2000)  Care Plan - Patient's Chronic Conditions and Co-Morbidity Symptoms are Monitored and Maintained or Improved:   Monitor and assess patient's chronic conditions and comorbid symptoms for stability, deterioration, or improvement   Update acute care plan with appropriate goals if chronic or comorbid symptoms are exacerbated and prevent overall improvement and discharge   Collaborate with multidisciplinary team to address chronic and comorbid conditions and prevent exacerbation or deterioration     Problem: Discharge Planning  Goal: Discharge to home or other facility with appropriate resources  Outcome: Progressing  Flowsheets  Taken 3/23/2024 2130 by Maria Luz Bird RN  Discharge to home or other facility with appropriate resources:   Identify barriers to discharge with patient and caregiver   Arrange for needed discharge resources and transportation as appropriate   Identify discharge learning needs (meds, wound care, etc)   Refer to discharge planning if patient needs post-hospital services based on physician order or complex needs related to functional status, cognitive ability or social support system  Taken 3/23/2024 2000 by Maria Luz Bird RN  Discharge to home or other facility with appropriate resources:   Identify barriers to discharge with patient and caregiver   Arrange for needed discharge resources and transportation as appropriate   Identify discharge learning needs (meds, wound care, etc)   Refer to discharge planning if patient needs post-hospital services based on physician order or complex needs related to 
  Problem: Safety - Adult  Goal: Free from fall injury  Outcome: Progressing  Note: Fall precautions are in place      Problem: Chronic Conditions and Co-morbidities  Goal: Patient's chronic conditions and co-morbidity symptoms are monitored and maintained or improved  Outcome: Progressing     Problem: Discharge Planning  Goal: Discharge to home or other facility with appropriate resources  Outcome: Progressing  Note: Team working together to meet discharge barriers      Problem: Safety - Adult  Goal: Free from fall injury  Outcome: Progressing  Note: Fall precautions are in place      Problem: Chronic Conditions and Co-morbidities  Goal: Patient's chronic conditions and co-morbidity symptoms are monitored and maintained or improved  Outcome: Progressing     Problem: Discharge Planning  Goal: Discharge to home or other facility with appropriate resources  Outcome: Progressing  Note: Team working together to meet discharge barriers      
Pt is here [facility]. He has been A&O x4 today with periods of confusion and impulsivity but is easily reoriented   Wound the right lower are has new dressing, clean dry, and intact  External cath/brief in place and patent  Vitals stable on room air   Free from falls ,1 assist with walkers, had 2 BM's today. Fall precautions in place     Problem: Safety - Adult  Goal: Free from fall injury  4/3/2024 1802 by Dina Mcnamara, RN  Outcome: Progressing     Problem: Discharge Planning  Goal: Discharge to home or other facility with appropriate resources  4/3/2024 1802 by Dina Mcnamara, RN  Outcome: Progressing     Problem: Skin/Tissue Integrity  Goal: Absence of new skin breakdown  Description: 1.  Monitor for areas of redness and/or skin breakdown  2.  Assess vascular access sites hourly  3.  Every 4-6 hours minimum:  Change oxygen saturation probe site  4.  Every 4-6 hours:  If on nasal continuous positive airway pressure, respiratory therapy assess nares and determine need for appliance change or resting period.  4/3/2024 1802 by Dina Mcnamara, RN  Outcome: Progressing     
Progressing  Flowsheets (Taken 4/6/2024 0800)  Achieves maximal functionality and self care: Monitor swallowing and airway patency with patient fatigue and changes in neurological status     Problem: Confusion  Goal: Confusion, delirium, dementia, or psychosis is improved or at baseline  Description: INTERVENTIONS:  1. Assess for possible contributors to thought disturbance, including medications, impaired vision or hearing, underlying metabolic abnormalities, dehydration, psychiatric diagnoses, and notify attending LIP  2. [place] high risk fall precautions, as indicated  3. Provide frequent short contacts to provide reality reorientation, refocusing and direction  4. Decrease environmental stimuli, including noise as appropriate  5. Monitor and intervene to maintain adequate nutrition, hydration, elimination, sleep and activity  6. If unable to ensure safety without constant attention obtain sitter and review sitter guidelines with assigned personnel  7. Initiate Psychosocial CNS and Spiritual Care consult, as indicated  4/6/2024 2141 by Stalin Cavanaugh RN  Outcome: Progressing  Flowsheets (Taken 4/6/2024 1600 by Moses Quevedo RN)  Effect of thought disturbance (confusion, delirium, dementia, or psychosis) are managed with adequate functional status: Assess for contributors to thought disturbance, including medications, impaired vision or hearing, underlying metabolic abnormalities, dehydration, psychiatric diagnoses, notify LIP  4/6/2024 1524 by Moses Quevedo RN  Outcome: Progressing  Flowsheets (Taken 4/6/2024 0800)  Effect of thought disturbance (confusion, delirium, dementia, or psychosis) are managed with adequate functional status: Assess for contributors to thought disturbance, including medications, impaired vision or hearing, underlying metabolic abnormalities, dehydration, psychiatric diagnoses, notify LIP     Problem: Cardiovascular - Adult  Goal: Maintains optimal cardiac output and 
baseline  Outcome: Completed     Problem: Metabolic/Fluid and Electrolytes - Adult  Goal: Electrolytes maintained within normal limits  Outcome: Completed     Problem: Musculoskeletal - Adult  Goal: Return ADL status to a safe level of function  Outcome: Completed     Problem: Nutrition Deficit:  Goal: Optimize nutritional status  Outcome: Completed     Problem: Pain  Goal: Verbalizes/displays adequate comfort level or baseline comfort level  Outcome: Completed     
care with guidance from physical therapy/occupational therapy   Encourage visually impaired, hearing impaired and aphasic patients to use assistive/communication devices     
reality reorientation, refocusing and direction  4. Decrease environmental stimuli, including noise as appropriate  5. Monitor and intervene to maintain adequate nutrition, hydration, elimination, sleep and activity  6. If unable to ensure safety without constant attention obtain sitter and review sitter guidelines with assigned personnel  7. Initiate Psychosocial CNS and Spiritual Care consult, as indicated  4/1/2024 2214 by Maria Luz Bird RN  Outcome: Progressing  4/1/2024 1804 by Willow Weber RN  Outcome: Progressing     Problem: Cardiovascular - Adult  Goal: Maintains optimal cardiac output and hemodynamic stability  4/1/2024 2214 by Maria Luz Bird RN  Outcome: Progressing  4/1/2024 1804 by Willow Weber RN  Outcome: Progressing  Goal: Absence of cardiac dysrhythmias or at baseline  4/1/2024 2214 by Maria Luz Bird RN  Outcome: Progressing  4/1/2024 1804 by Willow Weber RN  Outcome: Progressing     Problem: Metabolic/Fluid and Electrolytes - Adult  Goal: Electrolytes maintained within normal limits  4/1/2024 2214 by Maria Luz Bird RN  Outcome: Progressing  4/1/2024 1804 by Willow Weber RN  Outcome: Progressing     Problem: Musculoskeletal - Adult  Goal: Return ADL status to a safe level of function  4/1/2024 2214 by Maria Luz Bird RN  Outcome: Progressing  4/1/2024 1804 by Willow Weber RN  Outcome: Progressing     Problem: Nutrition Deficit:  Goal: Optimize nutritional status  4/1/2024 2214 by Maria Luz Bird RN  Outcome: Progressing  4/1/2024 1804 by Willow Weber RN  Outcome: Progressing     Problem: Pain  Goal: Verbalizes/displays adequate comfort level or baseline comfort level  4/1/2024 2214 by Maria Luz Bird RN  Outcome: Progressing  4/1/2024 1804 by Willow Weber RN  Outcome: Progressing     
sites hourly  3.  Every 4-6 hours minimum:  Change oxygen saturation probe site  4.  Every 4-6 hours:  If on nasal continuous positive airway pressure, respiratory therapy assess nares and determine need for appliance change or resting period.  4/3/2024 2345 by Kvng Perez RN  Outcome: Progressing     Problem: Neurosensory - Adult  Goal: Achieves maximal functionality and self care  Outcome: Progressing  Flowsheets (Taken 4/3/2024 2345)  Achieves maximal functionality and self care:   Monitor swallowing and airway patency with patient fatigue and changes in neurological status   Encourage and assist patient to increase activity and self care with guidance from physical therapy/occupational therapy   Encourage visually impaired, hearing impaired and aphasic patients to use assistive/communication devices     Problem: Confusion  Goal: Confusion, delirium, dementia, or psychosis is improved or at baseline  Description: INTERVENTIONS:  1. Assess for possible contributors to thought disturbance, including medications, impaired vision or hearing, underlying metabolic abnormalities, dehydration, psychiatric diagnoses, and notify attending LIP  2. [place] high risk fall precautions, as indicated  3. Provide frequent short contacts to provide reality reorientation, refocusing and direction  4. Decrease environmental stimuli, including noise as appropriate  5. Monitor and intervene to maintain adequate nutrition, hydration, elimination, sleep and activity  6. If unable to ensure safety without constant attention obtain sitter and review sitter guidelines with assigned personnel  7. Initiate Psychosocial CNS and Spiritual Care consult, as indicated  Outcome: Progressing  Flowsheets (Taken 4/3/2024 2345)  Effect of thought disturbance (confusion, delirium, dementia, or psychosis) are managed with adequate functional status:   Assess for contributors to thought disturbance, including medications, impaired vision or hearing, 
  Problem: Nutrition Deficit:  Goal: Optimize nutritional status  Outcome: Progressing     Problem: Musculoskeletal - Adult  Goal: Return ADL status to a safe level of function  3/31/2024 0142 by Tereza Dunn, RN  Outcome: Progressing     
Progressing  4/3/2024 2345 by Kvng Perez, RN  Outcome: Progressing  Flowsheets (Taken 4/3/2024 2345)  Verbalizes/displays adequate comfort level or baseline comfort level:   Encourage patient to monitor pain and request assistance   Assess pain using appropriate pain scale   Administer analgesics based on type and severity of pain and evaluate response   Implement non-pharmacological measures as appropriate and evaluate response   Consider cultural and social influences on pain and pain management   Notify Licensed Independent Practitioner if interventions unsuccessful or patient reports new pain

## 2024-04-07 NOTE — PROGRESS NOTES
[facility] Neurology   IN-PATIENT SERVICE      NEUROLOGY PROGRESS  NOTE            Date:   3/27/2024  Patient name:  Keith Oreilly  Date of admission:  3/23/2024  YOB: 1940      Interval History:     3/27: No family at bedside today.  Called family to update, no response.  Family requested to start medication for dementia yesterday, started on Aricept per primary team.  Overnight, was again agitated although redirected, did not require any antipsychotics.  MRI cervical spine done.  He appears calm this morning.  He denies any acute neurological complaints.  No new focal weakness, numbness.  No neck or arm/leg pain.    3/26: Overnight, was agitated.  This a.m., was asleep on CPAP but easily awakened, answers questions, at baseline.  MRI cervical spine pending.  No new neurological deficits.    3/25: No acute events.  Patient continues to have right arm weakness.  No new neurological complaints.  Per daughters at bedside, he was initially lethargic on admission but overall, mentation has significantly improved.  Patient himself does not remember falling or other events on the day of admission.    At baseline, he has history of cognitive impairment, is oriented x 2-3.  He no longer drives.  Daughters assist in managing finances.  He uses a cane or walker for ambulation.  No prior history of seizures or seizure-like activity.    History of Present Illness:     Per my partner:    84 yo male with fall . He has history of dementia along with atrial fibrillation and CAD on eliquis 5 mg po bid and aspirin 81 mg po qd  . Patient's wife this morning on awakening found patient on floor adjacent to bed with her thinking he had been in bed laying on floor or several hours . Patient was drowsy confused with simple verbal response brought to [facility] ER . Head CT with left hippocampal attenuation possible lacune with bilateral chronic periventricular small vessel ischemia . CPK 2954 , Blood sugar 494 . 
  [facility]  Progress Note    Keith Oreilly  MRN: [id number]  Date: 3/25/2024  LOS: 2     Reason for F/U :   Leukocytosis    Impression :   Fall at home with unclear etiology  Leukocytosis-improved  Bibasilar atelectasis and/or small effusions without any focal consolidation  Poorly controlled diabetes mellitus with hemoglobin A1c of 10  Coronary artery disease  Paroxysmal atrial fibrillation  Cognitive impairment/dementia    Recommendations:   The leukocytosis has resolved and at this point in time there is no evidence of an acute infectious process.  The culture data has remained negative.  The patient remained stable off systemic antibiotic therapy.  From an infectious disease standpoint there is nothing further to add and I will sign off    Infection Control Recommendations:   Universal precautions    Discharge Planning:   Patient will need Midline Catheter Insertion/ PICC line Insertion: No  Patient will need: Home IV , Infusion Center,  SNF,  LTAC: Undetermined  Patient willneed outpatient wound care: No    Medical Decision making / Summary of Stay:   Keith Oreilly is a 83 y.o.-year-old male who was initially admitted on 3/23/2024.   Keith is seen and evaluated at the bedside and has diabetes mellitus type 2, coronary artery disease, paroxysmal atrial fibrillation, hypertension, morbid obesity, tachybradycardia syndrome, arthritis and cognitive impairment/dementia.     The patient lives at home with his wife and does not drive has been sleeping more recently and it is my understanding that he had a fall and was found on the floor next to the bed in the morning when his wife woke up.  The patient was drowsy and somewhat confused.    The patient currently has right shoulder pain and left lower extremity pain.    The patient had imaging done that showed chronic periventricular changes without an acute cerebral infarction and MRA showed right neck ICA 50 to 60% stenosis.  The patient did have 
  [facility] Neurology   IN-PATIENT SERVICE      NEUROLOGY PROGRESS  NOTE            Date:   3/26/2024  Patient name:  Keith Oreilly  Date of admission:  3/23/2024  YOB: 1940      Interval History:     3/26: Overnight, was agitated.  This a.m., was asleep on CPAP but easily awakened, answers questions, at baseline.  MRI cervical spine pending.  No new neurological deficits.    3/25: No acute events.  Patient continues to have right arm weakness.  No new neurological complaints.  Per daughters at bedside, he was initially lethargic on admission but overall, mentation has significantly improved.  Patient himself does not remember falling or other events on the day of admission.    At baseline, he has history of cognitive impairment, is oriented x 2-3.  He no longer drives.  Daughters assist in managing finances.  He uses a cane or walker for ambulation.  No prior history of seizures or seizure-like activity.    History of Present Illness:     Per my partner:    82 yo male with fall . He has history of dementia along with atrial fibrillation and CAD on eliquis 5 mg po bid and aspirin 81 mg po qd  . Patient's wife this morning on awakening found patient on floor adjacent to bed with her thinking he had been in bed laying on floor or several hours . Patient was drowsy confused with simple verbal response brought to [facility] ER . Head CT with left hippocampal attenuation possible lacune with bilateral chronic periventricular small vessel ischemia . CPK 2954 , Blood sugar 494 . Hga1c 10.6 , creatinine 1.3 . He was complaining of mild low back pain  . CT lumbar spine with spondylosis with severe stenosis L4-5He has history of atrial fibrillation , CAD , DM , hyperlipidemia  sleep apnea . Cholesterol 148 , LDL 73 , , lactic acid 3.5 , WBC 17.3 k .  Significant medications eliquis 5 mg po bid , aspirin 81 mg po qd  . Testing CT lumbar spine with spondylosis with severe stenosis L4-5. Head CT with 
  Progress Note  [facility].,    Adult Hospitalist      Name: Keith Oreilly  MRN: [id number]     Acct: [id number]  Room: 2035/2035-01    Admit Date: 3/23/2024  8:59 AM  PCP: Chaparrita Barrios MD    Primary Problem  Principal Problem:    CVA (cerebrovascular accident due to intracerebral hemorrhage) (HCC)  Active Problems:    Encephalopathy    Dementia without behavioral disturbance, psychotic disturbance, mood disturbance, or anxiety (HCC)    Leukocytosis  Resolved Problems:    * No resolved hospital problems. *        Assesment/ plan:      Patient admitted intermediate        CVA   CT brain showed age indeterminate lacunar infarct near the left hippocampus potentially subacute to chronic   Neuro checks per protocol   Echocardiogram   Hemoglobin A1c and statin   Neuro  consult      Acute rhabdomyolysis    Monitor CK and myoglobin   IV fluids   Monitor renal function      Leukocytosis   Lactate and procalcitonin  Blood culture  Sputum culture   Urinalysis with culture if needed   Chest x-ray shows bibasilar atelectasis and or small effusion, no focal consolidation   Empiric IV cefepime was given but currently monitoring off antibiotics  ID consult        Hyperglycemia   Diabetes mellitus is uncontrolled   Check hemoglobin A1c   Presentation blood glucose was 554   Continue home dose of glimepiride  Added Lantus 10 units daily with high intensity sliding scale  Humalog 5 units Premeal with hold parameters  Monitor blood glucose      Hyponatremia    Likely from  hyperglycemia   Monitor sodium      Acute kidney injury     Creatinine was 1.3 on presentation   IV fluids  Monitor creatinine      Lactic acidosis  Elevated lactate at 2.2   IV fluids   Monitor lactate and procalcitonin    R arm weakness  Right shoulder and humerus x-ray ordered by neurology          Hypertensive heart disease   Monitor blood pressure           Paroxysmal atrial fibrillation   Monitor heart rate   On Eliquis        Coronary artery disease 
  Progress Note  [facility].,    Adult Hospitalist      Name: Keith Oreilly  MRN: [id number]     Acct: [id number]  Room: 2035/2035-01    Admit Date: 3/23/2024  8:59 AM  PCP: Chaparrita Barrios MD    Primary Problem  Principal Problem:    CVA (cerebrovascular accident due to intracerebral hemorrhage) (HCC)  Active Problems:    Encephalopathy    Cognitive impairment    Leukocytosis    Lacunar infarction (HCC)    Fall    Right arm weakness  Resolved Problems:    * No resolved hospital problems. *        Assesment/ plan:      Patient admitted intermediate        CVA   CT brain showed age indeterminate lacunar infarct near the left hippocampus potentially subacute to chronic   Neuro checks per protocol   Echocardiogram   Hemoglobin A1c and statin   Neuro  consult      Acute rhabdomyolysis    Monitor CK and myoglobin   IV fluids   Monitor renal function  Patient pulled IV out  Discussed with RN.  Need IV access      Leukocytosis   Lactate and procalcitonin  Blood culture  Sputum culture   Urinalysis with culture if needed   Chest x-ray shows bibasilar atelectasis and or small effusion, no focal consolidation   Empiric IV cefepime was given but currently monitoring off antibiotics  ID consult        Hyperglycemia   Diabetes mellitus is uncontrolled   Check hemoglobin A1c   Presentation blood glucose was 554   Continue home dose of glimepiride  Added Lantus 10 units daily with high intensity sliding scale  Humalog 5 units Premeal with hold parameters  Monitor blood glucose      Hyponatremia    Likely from  hyperglycemia   Monitor sodium      Acute kidney injury     Creatinine was 1.3 on presentation   IV fluids  Monitor creatinine      Lactic acidosis  Elevated lactate at 2.2   IV fluids   Monitor lactate and procalcitonin    R arm weakness  Right shoulder and humerus x-ray ordered by neurology      Rt lower arm wound  Wound care    Hypertensive heart disease   Monitor blood pressure           Paroxysmal atrial 
  Progress Note  [facility]    Adult Hospitalist      Name: Keith Oreilly  MRN: [id number]     Acct: [id number]  Room: 2035/2035-01    Admit Date: 3/23/2024  8:59 AM  PCP: Chaparrita Barrios MD    Primary Problem  Principal Problem:    CVA (cerebrovascular accident due to intracerebral hemorrhage) (HCC)  Active Problems:    Encephalopathy    Cognitive impairment    Leukocytosis    Lacunar infarction (HCC)    Fall    Right arm weakness  Resolved Problems:    * No resolved hospital problems. *        Assesment/ plan:      Patient admitted intermediate        CVA  CT brain showed age indeterminate lacunar infarct near the left hippocampus potentially subacute to chronic  Neuro checks per protocol   Echocardiogram   Hemoglobin A1c and statin   Neuro  consult    Acute rhabdomyolysis   Monitor CK and myoglobin   IV fluids   Monitor renal function  Patient pulled IV out  Discussed with RN.  Need IV access    Leukocytosis   Lactate and procalcitonin  Blood culture  Sputum culture   Urinalysis with culture if needed   Chest x-ray shows bibasilar atelectasis and or small effusion, no focal consolidation   Empiric IV cefepime was given but currently monitoring off antibiotics  ID consult    Hyperglycemia   Diabetes mellitus is uncontrolled   Check hemoglobin A1c   Presentation blood glucose was 554   Continue home dose of glimepiride  Added Lantus 10 units daily with high intensity sliding scale  Humalog 5 units Premeal with hold parameters  Monitor blood glucose    Hyponatremia    Likely from  hyperglycemia   Monitor sodium    Acute kidney injury   Creatinine was 1.3 on presentation   IV fluids  Monitor creatinine    Lactic acidosis  Elevated lactate at 2.2   IV fluids   Monitor lactate and procalcitonin    R arm weakness  Right shoulder and humerus x-ray ordered by neurology    Rt lower arm wound  Wound care    Hypertensive heart disease  Monitor blood pressure     Paroxysmal atrial fibrillation   Monitor heart rate   On 
  Progress Note  [facility]    Adult Hospitalist      Name: Keith Oreilly  MRN: [id number]     Acct: [id number]  Room: 2035/2035-01    Admit Date: 3/23/2024  8:59 AM  PCP: Chaparrita Barrios MD    Primary Problem  Principal Problem:    CVA (cerebrovascular accident due to intracerebral hemorrhage) (HCC)  Active Problems:    Encephalopathy    Cognitive impairment    Leukocytosis    Lacunar infarction (HCC)    Fall    Right arm weakness  Resolved Problems:    * No resolved hospital problems. *        Assesment/ plan:      Patient admitted intermediate        CVA   CT brain showed age indeterminate lacunar infarct near the left hippocampus potentially subacute to chronic   Neuro checks per protocol   Echocardiogram   Hemoglobin A1c and statin   Neuro  consult      Acute rhabdomyolysis    Monitor CK and myoglobin   IV fluids   Monitor renal function  Patient pulled IV out  Discussed with RN.  Need IV access      Leukocytosis   Lactate and procalcitonin  Blood culture  Sputum culture   Urinalysis with culture if needed   Chest x-ray shows bibasilar atelectasis and or small effusion, no focal consolidation   Empiric IV cefepime was given but currently monitoring off antibiotics  ID consult        Hyperglycemia   Diabetes mellitus is uncontrolled   Check hemoglobin A1c   Presentation blood glucose was 554   Continue home dose of glimepiride  Added Lantus 10 units daily with high intensity sliding scale  Humalog 5 units Premeal with hold parameters  Monitor blood glucose      Hyponatremia    Likely from  hyperglycemia   Monitor sodium      Acute kidney injury     Creatinine was 1.3 on presentation   IV fluids  Monitor creatinine      Lactic acidosis  Elevated lactate at 2.2   IV fluids   Monitor lactate and procalcitonin    R arm weakness  Right shoulder and humerus x-ray ordered by neurology      Rt lower arm wound  Wound care    Hypertensive heart disease   Monitor blood pressure           Paroxysmal atrial 
  Progress Note  [facility]    Adult Hospitalist      Name: Keith Oreilly  MRN: [id number]     Acct: [id number]  Room: 2035/2035-01    Admit Date: 3/23/2024  8:59 AM  PCP: Chaparrita Barrios MD    Primary Problem  Principal Problem:    CVA (cerebrovascular accident due to intracerebral hemorrhage) (HCC)  Active Problems:    Encephalopathy    Cognitive impairment    Leukocytosis    Lacunar infarction (HCC)    Fall    Right arm weakness  Resolved Problems:    * No resolved hospital problems. *        Assesment/ plan:     Patient is on intermediate level  Cardiac monitoring    CVA  CT brain showed age indeterminate lacunar infarct near the left hippocampus potentially subacute to chronic  Neuro checks per protocol   Echocardiogram shows EF 60 to 65%, mildly abnormal diastolic function  Hemoglobin A1c: 10.6 and statin   Neuro  consult  Await bed at [facility]    Acute rhabdomyolysis   Monitor CK and myoglobin   IV fluids discontinued  Monitor renal function    Leukocytosis  Lactate and procalcitonin  Blood culture showed no growth  Sputum culture   Urinalysis with culture if needed   Chest x-ray shows bibasilar atelectasis and or small effusion, no focal consolidation   Empiric IV cefepime was given but currently monitoring off antibiotics  ID was consulted and recommended to monitor off antibiotic    Hyperglycemia  Diabetes mellitus is uncontrolled   Check hemoglobin A1c   Presentation blood glucose was 554   Continue home dose of glimepiride  Added Lantus 10 units daily with high intensity sliding scale  Humalog 5 units Premeal with hold parameters  Monitor blood glucose    Hyponatremia : Resolved  Likely from hyperglycemia   Monitor sodium    Acute kidney injury : Resolved  Creatinine was 1.3 on presentation   IV fluids discontinued  Monitor creatinine    Lactic acidosis  Elevated lactate at 2.2   IV fluids   Monitor lactate and procalcitonin    R arm weakness likely radial nerve palsy  MRI cervical spine with 
  Progress Note  [facility]    Adult Hospitalist      Name: Keith Oreilly  MRN: [id number]     Acct: [id number]  Room: 2035/2035-01    Admit Date: 3/23/2024  8:59 AM  PCP: Chaparrita Barrios MD    Primary Problem  Principal Problem:    CVA (cerebrovascular accident due to intracerebral hemorrhage) (HCC)  Active Problems:    Encephalopathy    Cognitive impairment    Leukocytosis    Lacunar infarction (HCC)    Fall    Right arm weakness  Resolved Problems:    * No resolved hospital problems. *        Assesment/ plan:     Patient is on intermediate level  Cardiac monitoring    CVA  CT brain showed age indeterminate lacunar infarct near the left hippocampus potentially subacute to chronic  Neuro checks per protocol   Echocardiogram shows EF 60 to 65%, mildly abnormal diastolic function  Hemoglobin A1c: 10.6 and statin   Neuro  consult  Await bed at [facility]    Acute rhabdomyolysis   Monitor CK and myoglobin   IV fluids discontinued  Monitor renal function    Leukocytosis  Lactate and procalcitonin  Blood culture showed no growth  Sputum culture   Urinalysis with culture if needed   Chest x-ray shows bibasilar atelectasis and or small effusion, no focal consolidation   Empiric IV cefepime was given but currently monitoring off antibiotics  ID was consulted and recommended to monitor off antibiotic    Hyperglycemia  Diabetes mellitus is uncontrolled   Check hemoglobin A1c   Presentation blood glucose was 554   Continue home dose of glimepiride  Added Lantus 10 units daily with high intensity sliding scale  Humalog 5 units Premeal with hold parameters  Monitor blood glucose    Hyponatremia : Resolved  Likely from hyperglycemia   Monitor sodium    Acute kidney injury : Resolved  Creatinine was 1.3 on presentation   IV fluids discontinued  Monitor creatinine    Lactic acidosis  Elevated lactate at 2.2   IV fluids   Monitor lactate and procalcitonin    R arm weakness likely radial nerve palsy  MRI cervical spine with 
  Progress Note  [facility]    Adult Hospitalist      Name: Keith Oreilly  MRN: [id number]     Acct: [id number]  Room: 2035/2035-01    Admit Date: 3/23/2024  8:59 AM  PCP: Chaparrita Barrios MD    Primary Problem  Principal Problem:    CVA (cerebrovascular accident due to intracerebral hemorrhage) (HCC)  Active Problems:    Encephalopathy    Cognitive impairment    Leukocytosis    Lacunar infarction (HCC)    Fall    Right arm weakness  Resolved Problems:    * No resolved hospital problems. *        Assesment/ plan:      Patient is on intermediate level        CVA  CT brain showed age indeterminate lacunar infarct near the left hippocampus potentially subacute to chronic  Neuro checks per protocol   Echocardiogram shows EF 60 to 65%, mildly abnormal diastolic function  Hemoglobin A1c: 10.6 and statin   Neuro  consult  Await bed at [facility]      Acute rhabdomyolysis   Monitor CK and myoglobin   IV fluids   Monitor renal function  Patient pulled IV out  Discussed with RN.  Need IV access    Leukocytosis   Lactate and procalcitonin  Blood culture  Sputum culture   Urinalysis with culture if needed   Chest x-ray shows bibasilar atelectasis and or small effusion, no focal consolidation   Empiric IV cefepime was given but currently monitoring off antibiotics  ID was consulted and recommended to monitor off antibiotic    Hyperglycemia   Diabetes mellitus is uncontrolled   Check hemoglobin A1c   Presentation blood glucose was 554   Continue home dose of glimepiride  Added Lantus 10 units daily with high intensity sliding scale  Humalog 5 units Premeal with hold parameters  Monitor blood glucose    Hyponatremia    Likely from  hyperglycemia   Monitor sodium    Acute kidney injury   Creatinine was 1.3 on presentation   IV fluids  Monitor creatinine    Lactic acidosis  Elevated lactate at 2.2   IV fluids   Monitor lactate and procalcitonin    R arm weakness likely radial nerve palsy  MRI cervical spine with moderate to 
  Progress Note  [facility]    Adult Hospitalist      Name: Keith Oreilly  MRN: [id number]     Acct: [id number]  Room: 2035/2035-01    Admit Date: 3/23/2024  8:59 AM  PCP: Chaparrita Barrios MD    Primary Problem  Principal Problem:    CVA (cerebrovascular accident due to intracerebral hemorrhage) (HCC)  Active Problems:    Encephalopathy    Cognitive impairment    Leukocytosis    Lacunar infarction (HCC)    Fall    Right arm weakness  Resolved Problems:    * No resolved hospital problems. *        Assesment/ plan:      Patient is on intermediate level        CVA  CT brain showed age indeterminate lacunar infarct near the left hippocampus potentially subacute to chronic  Neuro checks per protocol   Echocardiogram shows EF 60 to 65%, mildly abnormal diastolic function  Hemoglobin A1c: 10.6 and statin   Neuro  consult  Await bed at [facility]      Acute rhabdomyolysis   Monitor CK and myoglobin   IV fluids   Monitor renal function  Patient pulled IV out  Discussed with RN.  Need IV access    Leukocytosis   Lactate and procalcitonin  Blood culture  Sputum culture   Urinalysis with culture if needed   Chest x-ray shows bibasilar atelectasis and or small effusion, no focal consolidation   Empiric IV cefepime was given but currently monitoring off antibiotics  ID was consulted and recommended to monitor off antibiotic    Hyperglycemia   Diabetes mellitus is uncontrolled   Check hemoglobin A1c   Presentation blood glucose was 554   Continue home dose of glimepiride  Added Lantus 10 units daily with high intensity sliding scale  Humalog 5 units Premeal with hold parameters  Monitor blood glucose    Hyponatremia    Likely from  hyperglycemia   Monitor sodium    Acute kidney injury   Creatinine was 1.3 on presentation   IV fluids  Monitor creatinine    Lactic acidosis  Elevated lactate at 2.2   IV fluids   Monitor lactate and procalcitonin    R arm weakness likely radial nerve palsy  MRI cervical spine with moderate to 
  Progress Note  [facility]    Adult Hospitalist      Name: Keith Oreilly  MRN: [id number]     Acct: [id number]  Room: 2035/2035-01    Admit Date: 3/23/2024  8:59 AM  PCP: Chaparrita Barrios MD    Primary Problem  Principal Problem:    CVA (cerebrovascular accident due to intracerebral hemorrhage) (HCC)  Active Problems:    Encephalopathy    Cognitive impairment    Leukocytosis    Lacunar infarction (HCC)    Fall    Right arm weakness  Resolved Problems:    * No resolved hospital problems. *        Assesment/ plan:      Patient admitted intermediate        CVA  CT brain showed age indeterminate lacunar infarct near the left hippocampus potentially subacute to chronic  Neuro checks per protocol   Echocardiogram   Hemoglobin A1c and statin   Neuro  consult  Await bed at [facility]      Acute rhabdomyolysis   Monitor CK and myoglobin   IV fluids   Monitor renal function  Patient pulled IV out  Discussed with RN.  Need IV access    Leukocytosis   Lactate and procalcitonin  Blood culture  Sputum culture   Urinalysis with culture if needed   Chest x-ray shows bibasilar atelectasis and or small effusion, no focal consolidation   Empiric IV cefepime was given but currently monitoring off antibiotics  ID consult    Hyperglycemia   Diabetes mellitus is uncontrolled   Check hemoglobin A1c   Presentation blood glucose was 554   Continue home dose of glimepiride  Added Lantus 10 units daily with high intensity sliding scale  Humalog 5 units Premeal with hold parameters  Monitor blood glucose    Hyponatremia    Likely from  hyperglycemia   Monitor sodium    Acute kidney injury   Creatinine was 1.3 on presentation   IV fluids  Monitor creatinine    Lactic acidosis  Elevated lactate at 2.2   IV fluids   Monitor lactate and procalcitonin    R arm weakness  Right shoulder and humerus x-ray ordered by neurology    Rt lower arm wound  Wound care    Hypertensive heart disease  Monitor blood pressure   Prn Hydralazine  D/w 
  Progress Note  [facility]    Adult Hospitalist      Name: Keith Oreilly  MRN: [id number]     Acct: [id number]  Room: 2035/2035-01    Admit Date: 3/23/2024  8:59 AM  PCP: Chaparrita Barrios MD    Primary Problem  Principal Problem:    CVA (cerebrovascular accident due to intracerebral hemorrhage) (HCC)  Active Problems:    Encephalopathy    Cognitive impairment    Leukocytosis    Lacunar infarction (HCC)    Fall    Right arm weakness  Resolved Problems:    * No resolved hospital problems. *        Assesment/ plan:      Patient admitted intermediate        CVA   CT brain showed age indeterminate lacunar infarct near the left hippocampus potentially subacute to chronic   Neuro checks per protocol   Echocardiogram   Hemoglobin A1c and statin   Neuro  consult      Acute rhabdomyolysis    Monitor CK and myoglobin   IV fluids   Monitor renal function  Patient pulled IV out  Discussed with RN.  Need IV access      Leukocytosis   Lactate and procalcitonin  Blood culture  Sputum culture   Urinalysis with culture if needed   Chest x-ray shows bibasilar atelectasis and or small effusion, no focal consolidation   Empiric IV cefepime was given but currently monitoring off antibiotics  ID consult        Hyperglycemia   Diabetes mellitus is uncontrolled   Check hemoglobin A1c   Presentation blood glucose was 554   Continue home dose of glimepiride  Added Lantus 10 units daily with high intensity sliding scale  Humalog 5 units Premeal with hold parameters  Monitor blood glucose      Hyponatremia    Likely from  hyperglycemia   Monitor sodium      Acute kidney injury     Creatinine was 1.3 on presentation   IV fluids  Monitor creatinine      Lactic acidosis  Elevated lactate at 2.2   IV fluids   Monitor lactate and procalcitonin    R arm weakness  Right shoulder and humerus x-ray ordered by neurology          Hypertensive heart disease   Monitor blood pressure           Paroxysmal atrial fibrillation   Monitor heart rate   On 
  [facility] Neurology   IN-PATIENT SERVICE      NEUROLOGY PROGRESS  NOTE            Date:   3/25/2024  Patient name:  Keith Oreilly  Date of admission:  3/23/2024  YOB: 1940      Interval History:     No acute events.  Patient continues to have right arm weakness.  No new neurological complaints.  Per daughters at bedside, he was initially lethargic on admission but overall, mentation has significantly improved.  Patient himself does not remember falling or other events on the day of admission.    At baseline, he has history of cognitive impairment, is oriented x 2-3.  He no longer drives.  Daughters assist in managing finances.  He uses a cane or walker for ambulation.  No prior history of seizures or seizure-like activity.    History of Present Illness:     Per my partner:    82 yo male with fall . He has history of dementia along with atrial fibrillation and CAD on eliquis 5 mg po bid and aspirin 81 mg po qd  . Patient's wife this morning on awakening found patient on floor adjacent to bed with her thinking he had been in bed laying on floor or several hours . Patient was drowsy confused with simple verbal response brought to [facility] ER . Head CT with left hippocampal attenuation possible lacune with bilateral chronic periventricular small vessel ischemia . CPK 2954 , Blood sugar 494 . Hga1c 10.6 , creatinine 1.3 . He was complaining of mild low back pain  . CT lumbar spine with spondylosis with severe stenosis L4-5He has history of atrial fibrillation , CAD , DM , hyperlipidemia  sleep apnea . Cholesterol 148 , LDL 73 , , lactic acid 3.5 , WBC 17.3 k .  Significant medications eliquis 5 mg po bid , aspirin 81 mg po qd  . Testing CT lumbar spine with spondylosis with severe stenosis L4-5. Head CT with left hippocampal attenuation possible lacune with bilateral chronic periventricular small vessel ischemia        Past Medical History:     Past Medical History:   Diagnosis Date    
4 Eyes Skin Assessment     NAME:  Keith Oreilly  YOB: 1940  MEDICAL RECORD NUMBER:  [id number]    The patient is being assessed for  Admission    I agree that at least one RN has performed a thorough Head to Toe Skin Assessment on the patient. ALL assessment sites listed below have been assessed.      Areas assessed by both nurses:    Head, Face, Ears, Shoulders, Back, Chest, Arms, Elbows, Hands, Sacrum. Buttock, Coccyx, Ischium, Legs. Feet and Heels, and Under Medical Devices         Does the Patient have a Wound? Yes wound(s) were present on assessment. LDA wound assessment was Initiated and completed by RN       Romeo Prevention initiated by RN: Yes  Wound Care Orders initiated by RN: Yes    Pressure Injury (Stage 3,4, Unstageable, DTI, NWPT, and Complex wounds) if present, place Wound referral order by RN under : No    New Ostomies, if present place, Ostomy referral order under : No     Nurse 1 eSignature: Electronically signed by Maria Luz Bird RN on 3/23/24 at 11:14 PM EDT    **SHARE this note so that the co-signing nurse can place an eSignature**    Nurse 2 eSignature: Electronically signed by Kristin Dougherty RN on 3/24/24 at 3:42 AM EDT    
Active problem Falling episode .  Dementia . Hyperglycemia .Possible infection . Right arm weakness . Possible radial nerve palsy . Atrial fibrillation . The condition MRI of Head with bilateral chronic periventricular along with old right caudate lacune. No acute cerebral infarction is seen .MRA of neck right ICA 50-60 % stenosis . CPK 4688 , blood sugar 238 , WBC 12.3 k . He is alert and oriented x 1 . There is right wrist extensor and long finger extensor at 4/5 strength with good hand  . He is complaining of right shoulder pain . He is lifting all limbs equally today . 84 yo male with fall . He has history of dementia along with atrial fibrillation and CAD on eliquis 5 mg po bid and aspirin 81 mg po qd  . Patient's wife this morning on awakening found patient on floor adjacent to bed with her thinking he had been in bed laying on floor or several hours . Patient was drowsy confused with simple verbal response brought to [facility] ER . Head CT with left hippocampal attenuation possible lacune with bilateral chronic periventricular small vessel ischemia . CPK 2954 , Blood sugar 494 . Hga1c 10.6 , creatinine 1.3 . He was complaining of mild low back pain  . CT lumbar spine with spondylosis with severe stenosis L4-5He has history of atrial fibrillation , CAD , DM , hyperlipidemia  sleep apnea . Cholesterol 148 , LDL 73 , , lactic acid 3.5 , WBC 17.3 k .  Significant medications eliquis 5 mg po bid , aspirin 81 mg po qd  . Testing CT lumbar spine with spondylosis with severe stenosis L4-5. Head CT with left hippocampal attenuation possible lacune with bilateral chronic periventricular small vessel ischemia . MRI of Head with bilateral chronic periventricular along with old right caudate lacune. MRA of neck right ICA 50-60 % stenosis      Past Medical History:   Diagnosis Date    Arthritis     Atrial fibrillation (HCC)     CAD (coronary artery disease)     no stents    CHF (congestive heart failure) 
Attempted to call daughter vishal back as she called for an update a little while ago and writer was assessing another patient.  Unable to get a hold of daughter, writer was able to leave  to have NOK return call.   
Comprehensive Nutrition Assessment    Type and Reason for Visit:  Initial, RD Nutrition Re-Screen/LOS    Nutrition Recommendations/Plan:   Provide diet rx as ordered   Monitor labs as they become available   Monitor skin integrity   Monitor weights as ordered      Malnutrition Assessment:  Malnutrition Status:  No malnutrition (03/29/24 1337)    Context:        Findings of the 6 clinical characteristics of malnutrition:  Energy Intake:     Weight Loss:        Body Fat Loss:        Muscle Mass Loss:       Fluid Accumulation:        Strength:       Nutrition Assessment:    4/2 BM active sounds,    Nutrition Related Findings:    4/2 BMI, edema RUE +2 non-pitting, BLE +1 non-pitting. Wound Type:  (wound to lower right posterior arm traumatic injury.)       Current Nutrition Intake & Therapies:    Average Meal Intake: %     ADULT DIET; Regular; 4 carb choices (60 gm/meal)    Anthropometric Measures:  Height: 182.9 cm (6')  Ideal Body Weight (IBW): 178 lbs (81 kg)    Admission Body Weight: 131.1 kg (289 lb)  Current Body Weight: 114.3 kg (252 lb), 141.6 % IBW. Weight Source: Bed Scale  Current BMI (kg/m2): 34.2  Usual Body Weight: 113.4 kg (250 lb)  % Weight Change (Calculated): 0.8  Weight Adjustment For: No Adjustment                 BMI Categories: Obese Class 1 (BMI 30.0-34.9)    Estimated Daily Nutrient Needs:  Energy Requirements Based On: Kcal/kg  Weight Used for Energy Requirements: Current  Energy (kcal/day): 4461-8539 kcals per day using 15-18 g/kg of actual body weight  Weight Used for Protein Requirements: Ideal  Protein (g/day):  grams per day using 1.2-1.3 g/kg of ideal body weight  Method Used for Fluid Requirements: 1 ml/kcal  Fluid (ml/day): 8439-9878 ml per day using 1ml/kcal    Nutrition Diagnosis:   Overweight/Obese related to excessive energy intake as evidenced by BMI    Nutrition Interventions:   Food and/or Nutrient Delivery: Continue Current Diet  Nutrition Education/Counseling: 
Comprehensive Nutrition Assessment    Type and Reason for Visit:  Initial, RD Nutrition Re-Screen/LOS    Nutrition Recommendations/Plan:   Provide diet rx as ordered   Monitor labs as they become available   Monitor skin integrity/weights     Malnutrition Assessment:  Malnutrition Status:  No malnutrition (03/29/24 1457)    Context:        Findings of the 6 clinical characteristics of malnutrition:  Energy Intake:     Weight Loss:        Body Fat Loss:        Muscle Mass Loss:       Fluid Accumulation:        Strength:       Nutrition Assessment:    pt had fallen out of bed upon arrival pt was found on floor but thought he was still in bed and did not recall falling pt has h/o dementia pt was brought to ed per life squad. Patient has mild dementia, 3/26 noted asleep with CPAP, 3/27 started on dementia medication. Plan is to discharge to a SNF, and back up plan is home with [facility]. Is tolerating diet and eating well. 3/29 was 252#, weight histroy 3/24 was 236# an unlikely gain of 16# x 3 days. UBW last three weights appears to be around 250#. Monitor po intakes, discharge planning, weights, labs, skin integrity.    Nutrition Related Findings:    Edema to RUE +2 non-pitting, BM 3/28 active sounds, wound to lower right posterior arm traumatic injury. Wound Type:  (wound to lower right posterior arm traumatic injury.)       Current Nutrition Intake & Therapies:    Average Meal Intake: %     ADULT DIET; Regular; 4 carb choices (60 gm/meal)    Anthropometric Measures:  Height: 182.9 cm (6')  Ideal Body Weight (IBW): 178 lbs (81 kg)    Admission Body Weight: 131.1 kg (289 lb)  Current Body Weight: 114.3 kg (252 lb), 141.6 % IBW. Weight Source: Bed Scale  Current BMI (kg/m2): 34.2  Usual Body Weight: 113.4 kg (250 lb)  % Weight Change (Calculated): 0.8  Weight Adjustment For: No Adjustment                 BMI Categories: Obese Class 1 (BMI 30.0-34.9)    Estimated Daily Nutrient Needs:  Energy Requirements Based 
Discharge Note:      All discharge instructions given at this time as well as all patient belongings returned to patient and Daughter. Discharge packet given to Wheelchair transport staff. Pt denies any further questions regarding discharge at this time.  Pt denies any further issues at this time. RN called report to [facility] [phone].     Pt wheeled out w transport staff and daughter at this time       
EEG completed full report to follow  
End Of Shift Note  [facility]  Summary of shift:   he pt had an uneventful night. He was up to the BR x 2  with assistance. He denied any c/o pain. All needs have been met. At the time of the  note the pt was in bed resting with call light within reach.       Vitals:    Vitals:    04/02/24 0150 04/02/24 0200 04/02/24 0300 04/02/24 0400   BP:       Pulse: 66 62 68 60   Resp:    16   Temp:    98 °F (36.7 °C)   TempSrc:    Temporal   SpO2: 94% 97% 93%    Weight:    112.5 kg (248 lb)   Height:            I&O:   Intake/Output Summary (Last 24 hours) at 4/2/2024 0619  Last data filed at 4/2/2024 0400  Gross per 24 hour   Intake 720 ml   Output 1660 ml   Net -940 ml       Resp Status: CPaP    Ventilator Settings:     / / /     Critical Care IV infusions:   sodium chloride      dextrose          LDA:   Peripheral IV 03/23/24 Right Forearm (Active)   Number of days: 9       External Urinary Catheter (Active)   Number of days: 2       Wound Arm Lower;Posterior;Right (Active)   Number of days:        Incision 12/03/20 Foot Right (Active)   Number of days: 1216          
End Of Shift Note  [facility]  Summary of shift: Overall patient had a good shift. Vitals were stable. Wound care consulted for arm. Wound care orders placed per wound care. Patient started on namenda and seroquel today. Patients family given handout information on namenda and A1C. Only one short episode of agitation around 1600, patient redirected and calmed down easily. Event lasted no longer than 10 minutes. Family aware.    Vitals:    Vitals:    03/27/24 1500 03/27/24 1600 03/27/24 1700 03/27/24 1800   BP:       Pulse: 61 83 73 78   Resp:       Temp:  98.2 °F (36.8 °C)     TempSrc:  Temporal     SpO2:  99%     Weight:       Height:            I&O: No intake or output data in the 24 hours ending 03/27/24 1952    Resp Status: room air    Ventilator Settings:     / / /     Critical Care IV infusions:   sodium chloride      dextrose          LDA:   Peripheral IV 03/23/24 Right Forearm (Active)   Number of days: 4       External Urinary Catheter (Active)   Number of days: 3       Wound Arm Lower;Posterior;Right (Active)   Number of days:        Incision 12/03/20 Foot Right (Active)   Number of days: 1210          
End Of Shift Note  [facility]  Summary of shift: Patient awaiting percert for [facility].  Patient was up to chair, family visited.    Vitals:    Vitals:    04/02/24 0900 04/02/24 0958 04/02/24 1155 04/02/24 1535   BP: 99/66 (!) 148/59 116/67 138/82   Pulse: (!) 46  67 69   Resp:   18 18   Temp:   97.3 °F (36.3 °C) 97.8 °F (36.6 °C)   TempSrc:   Temporal Temporal   SpO2: 95%  94% 97%   Weight:       Height:            I&O:   Intake/Output Summary (Last 24 hours) at 4/2/2024 1924  Last data filed at 4/2/2024 1836  Gross per 24 hour   Intake --   Output 1300 ml   Net -1300 ml       Resp Status: respirations easy at rest on room air    Ventilator Settings:     / / /     Critical Care IV infusions:   sodium chloride      dextrose          LDA:   Peripheral IV 03/23/24 Right Forearm (Active)   Number of days: 10       External Urinary Catheter (Active)   Number of days: 3       Wound Arm Lower;Posterior;Right (Active)   Number of days:        Incision 12/03/20 Foot Right (Active)   Number of days: 1216          
End Of Shift Note  [facility]  Summary of shift: Patient given first dose of seroquel and slept from 10pm to 3am. He is a 1-assist with walker to toilet, does not want to use commode or urinal. Hat in toilet. New swelling in right upper arm, above tear.  Family is pushing for rehab, [place] vs [facility]. Strength improving. No events overnight.     Vitals:    Vitals:    03/28/24 2000 03/29/24 0000 03/29/24 0400 03/29/24 0508   BP: (!) 153/79 (!) 149/77 138/77    Pulse: 68 70 63 63   Resp: 18 18 18    Temp: 97.7 °F (36.5 °C) 97.3 °F (36.3 °C) 97 °F (36.1 °C)    TempSrc: Temporal Temporal Temporal    SpO2:  94% 94%    Weight:  114.5 kg (252 lb 6.4 oz)     Height:  1.829 m (6')          I&O:   Intake/Output Summary (Last 24 hours) at 3/29/2024 0649  Last data filed at 3/29/2024 0508  Gross per 24 hour   Intake --   Output 2750 ml   Net -2750 ml       Resp Status: Room air, home cpap use at night    Ventilator Settings:     / / /     Critical Care IV infusions:   sodium chloride      dextrose          LDA:   Peripheral IV 03/23/24 Right Forearm (Active)   Number of days: 5       Wound Arm Lower;Posterior;Right (Active)   Number of days:        Incision 12/03/20 Foot Right (Active)   Number of days: 1212         
End Of Shift Note  [facility]  Summary of shift: Patient had a restless night.  Patient was able to sleep for short periods.  Patient had to be re-educated on fall risk, as he tried to get out of bed twice.  Patient did not verbalize understanding, and seemed to ignore me. Patient also yelled at writer and said he would punch the PCT.  He is a 1-assist with walker.  Patient has an external cath that has been working, but patient still continues to have leaks and forgets that he has it.  Patient was approved for admission at [facility], waiting for precert.       Vitals:    Vitals:    03/30/24 2000 03/30/24 2035 03/31/24 0412 03/31/24 0421   BP:  131/89 (!) 152/66    Pulse: 66 74 57    Resp:  17 16    Temp:  98.1 °F (36.7 °C) 98.3 °F (36.8 °C)    TempSrc:  Temporal Temporal    SpO2:   93%    Weight:    117.5 kg (259 lb)   Height:            I&O:   Intake/Output Summary (Last 24 hours) at 3/31/2024 0550  Last data filed at 3/31/2024 0431  Gross per 24 hour   Intake --   Output 3100 ml   Net -3100 ml       Resp Status: Room Air    Ventilator Settings:     / / /     Critical Care IV infusions:   sodium chloride      dextrose          LDA:   Peripheral IV 03/23/24 Right Forearm (Active)   Number of days: 7       External Urinary Catheter (Active)   Number of days: 0       Wound Arm Lower;Posterior;Right (Active)   Number of days:        Incision 12/03/20 Foot Right (Active)   Number of days: 1214          
End Of Shift Note  [facility]  Summary of shift: Patient had a very restless night.  Patient was calling out to urinate about every 1-2 hrs.  Or he would just sit up and set off the bed alarm.  Patient only wore his CPAP for less than an hour. He is a 1-assist with walker to toilet.  Patient was approved for admission at [facility], waiting for precert.  Speech therapy evaluation was ordered also.       Vitals:    Vitals:    03/29/24 2000 03/29/24 2335 03/30/24 0000 03/30/24 0400   BP: 131/72  (!) 141/67 (!) 134/56   Pulse: 78 83 69 62   Resp: 16  18 20   Temp: 97.7 °F (36.5 °C)  97.3 °F (36.3 °C) 97.7 °F (36.5 °C)   TempSrc: Temporal  Temporal Temporal   SpO2:       Weight:    117 kg (258 lb)   Height:            I&O:   Intake/Output Summary (Last 24 hours) at 3/30/2024 0545  Last data filed at 3/30/2024 0534  Gross per 24 hour   Intake 10 ml   Output 1100 ml   Net -1090 ml       Resp Status: Room Air    Ventilator Settings:     / / /     Critical Care IV infusions:   sodium chloride      dextrose          LDA:   Peripheral IV 03/23/24 Right Forearm (Active)   Number of days: 6       Wound Arm Lower;Posterior;Right (Active)   Number of days:        Incision 12/03/20 Foot Right (Active)   Number of days: 1213          
End Of Shift Note  [facility]  Summary of shift: Patient had uneventful day. Patient was alert and oriented throughout the day. Patient in no pain. Right arm wound dressing was changed. Physical therapy worked with patient. Patients appeal to [facility] is still pending, family may opt to take patient home with home care or to see if [facility] since patients mentation has improved. Patient expressing no needs at this time.     Vitals:    Vitals:    04/05/24 0429 04/05/24 0800 04/05/24 1200 04/05/24 1600   BP: 130/60 (!) 152/59 128/63 (!) 111/59   Pulse: 62 55 52 68   Resp: 17 18 19 21   Temp: 97.5 °F (36.4 °C) 97.5 °F (36.4 °C) 97.7 °F (36.5 °C) 98.4 °F (36.9 °C)   TempSrc: Temporal Temporal Temporal Temporal   SpO2: 95% 97% 94% 95%   Weight:       Height:            I&O:   Intake/Output Summary (Last 24 hours) at 4/5/2024 1917  Last data filed at 4/5/2024 1545  Gross per 24 hour   Intake --   Output 150 ml   Net -150 ml       Resp Status: Patient remained on room air and tolerating it well.     Ventilator Settings:     / / /     Critical Care IV infusions:   sodium chloride      dextrose          LDA:   Peripheral IV 03/23/24 Right Forearm (Active)   Number of days: 13       Wound Arm Lower;Posterior;Right (Active)   Number of days:        Incision 12/03/20 Foot Right (Active)   Number of days: 1219          
End Of Shift Note  [facility]  Summary of shift: Patient had uneventful day. Patient worked with PT/OT today. Patient alert and oriented x4 throughout the day. Patient has not complained of any pain. Patient was denied from [facility], appointment of appeal form faxed to [facility]. RN attempted to call patients wife Karlie to see if she could come up here, but patient informed RN that he had spoken with her. Patient expressing no needs at this time.     Vitals:    Vitals:    04/04/24 1100 04/04/24 1200 04/04/24 1547 04/04/24 1600   BP: (!) 151/72 109/60 (!) 125/114 134/63   Pulse: 56 51 63 61   Resp:   18    Temp:  97.5 °F (36.4 °C) 97.7 °F (36.5 °C)    TempSrc:  Temporal Temporal    SpO2: 92% 93% 95% 95%   Weight:       Height:            I&O:   Intake/Output Summary (Last 24 hours) at 4/4/2024 1945  Last data filed at 4/4/2024 0800  Gross per 24 hour   Intake 300 ml   Output 950 ml   Net -650 ml       Resp Status: Patient on room air tolerating it well.     Ventilator Settings:     / / /     Critical Care IV infusions:   sodium chloride      dextrose          LDA:   Peripheral IV 03/23/24 Right Forearm (Active)   Number of days: 12       Wound Arm Lower;Posterior;Right (Active)   Number of days:        Incision 12/03/20 Foot Right (Active)   Number of days: 1218          
End Of Shift Note  [facility]  Summary of shift: Patient has not slept for longer that 20 minutes all night.  Patient was able to get up to chair and bedside commode using conchita steady and 2 assist.  Patient's daughter was in the room all night with patient.  Patient did state that he wanted to get out of bed, and had to be redirected so he did remain in bed.    Vitals:    Vitals:    03/24/24 2200 03/24/24 2343 03/25/24 0322 03/25/24 0333   BP: (!) 141/79 (!) 157/70 (!) 146/64    Pulse: 70 67 69 59   Resp:  16 16    Temp:  97.3 °F (36.3 °C) 97.3 °F (36.3 °C)    TempSrc:  Temporal Temporal    SpO2:  95% 94% 98%   Weight:       Height:            I&O:   Intake/Output Summary (Last 24 hours) at 3/25/2024 0655  Last data filed at 3/25/2024 0457  Gross per 24 hour   Intake 580.17 ml   Output 1450 ml   Net -869.83 ml       Resp Status: Room Air    Ventilator Settings:     / / /     Critical Care IV infusions:   sodium chloride 75 mL/hr at 03/25/24 0457    sodium chloride      dextrose          LDA:   Peripheral IV 03/23/24 Left Forearm (Active)   Number of days: 1       External Urinary Catheter (Active)   Number of days: 0       Incision 12/03/20 Foot Right (Active)   Number of days: 1208          
End Of Shift Note  [facility]  Summary of shift: Patient is resting in bed. Neurology was consulted and ordered MRI which did not show evidence of cerebral infarct. Also an EEG was ordered and done at bedside which came back normal. Infectious disease was consulted and Dr. Chakraborty is not recommending any antibiotic therapy at this time. Also patients WBC is trending down and lactic has improved. He has blood cultures pending. His blood sugar has been much lower under 200 throughout the day. Patient and family were educated on proper blood sugar control and maintenance, including dietary choices. Family is concerned that more help is needed at home for their parents. Patient worked with therapy today and it is recommended that further rehabilitation will be needed at discharge. Family is considering a facility, or home care depending on what their insurance can cover.     Vitals:    Vitals:    03/24/24 0500 03/24/24 0800 03/24/24 1200 03/24/24 1600   BP: 126/67 (!) 150/79 (!) 149/77 (!) 152/85   Pulse: 62 72 74 62   Resp:       Temp:  97.7 °F (36.5 °C) 97.5 °F (36.4 °C) 97.1 °F (36.2 °C)   TempSrc:  Temporal Temporal Temporal   SpO2: 95% 93% 96% 95%   Weight:       Height:            I&O:   Intake/Output Summary (Last 24 hours) at 3/24/2024 1845  Last data filed at 3/24/2024 1733  Gross per 24 hour   Intake 720 ml   Output 675 ml   Net 45 ml       Resp Status: room air     Ventilator Settings:     / / /     Critical Care IV infusions:   sodium chloride 75 mL/hr at 03/23/24 1523    sodium chloride      dextrose          LDA:   Peripheral IV 03/23/24 Left Forearm (Active)   Number of days: 1       External Urinary Catheter (Active)   Number of days: 0       Incision 12/03/20 Foot Right (Active)   Number of days: 1207          
End Of Shift Note  [facility]  Summary of shift: Patient resting in bed. He became confused this evening and slightly aggressive with staff as he tore off all wires and external catheter and through it at the nursing assistant. Writer obtained tele sitter for safety as patient becomes confused trying to get out of bed often. Family was at bedside most of the day but they had to leave in the evening. MRI of cervical spine was ordered and writer was anticipating a call back from the department for when a good time was available. SNF choices were provided to patient and family and referrals were sent by case management. Patient will need precert upon discharge.     Vitals:    Vitals:    03/25/24 0333 03/25/24 0800 03/25/24 1200 03/25/24 1600   BP:  122/67 (!) 144/60 (!) 152/50   Pulse: 59 66 67 68   Resp:  16     Temp:  98.4 °F (36.9 °C) 97.5 °F (36.4 °C) 97.4 °F (36.3 °C)   TempSrc:  Temporal Temporal Temporal   SpO2: 98% 93% 96% 94%   Weight:       Height:            I&O:   Intake/Output Summary (Last 24 hours) at 3/25/2024 1841  Last data filed at 3/25/2024 1315  Gross per 24 hour   Intake 580.17 ml   Output 1750 ml   Net -1169.83 ml       Resp Status: room air     Ventilator Settings:     / / /     Critical Care IV infusions:   sodium chloride 75 mL/hr at 03/25/24 1236    sodium chloride      dextrose          LDA:   Peripheral IV 03/23/24 Left Forearm (Active)   Number of days: 2       External Urinary Catheter (Active)   Number of days: 1       Incision 12/03/20 Foot Right (Active)   Number of days: 1208          
End Of Shift Note  [facility]  Summary of shift: Patient resting in bed. He had MRI of cervical spine today and results were sent to neurology. One to one sitter was removed this afternoon and patient has been calm and cooperative. He was seen by his cardiologist today and started on losartan for HTN. Patient had a brief episode of bradycardia last night, but today has been normal rate.     Vitals:    Vitals:    03/26/24 0751 03/26/24 1130 03/26/24 1215 03/26/24 1600   BP: (!) 163/65 (!) 162/81 (!) 160/75 (!) 158/94   Pulse: 60 64 64 63   Resp: 20 18 18 18   Temp: 97.6 °F (36.4 °C) 98.2 °F (36.8 °C) 98 °F (36.7 °C) 97.7 °F (36.5 °C)   TempSrc: Infrared Infrared Temporal Temporal   SpO2: 96% 93% 96% 99%   Weight:       Height:            I&O:   Intake/Output Summary (Last 24 hours) at 3/26/2024 1842  Last data filed at 3/26/2024 1231  Gross per 24 hour   Intake --   Output 602 ml   Net -602 ml       Resp Status: room air     Ventilator Settings:     / / /     Critical Care IV infusions:   sodium chloride      dextrose          LDA:   Peripheral IV 03/23/24 Right Forearm (Active)   Number of days: 3       External Urinary Catheter (Active)   Number of days: 2       Incision 12/03/20 Foot Right (Active)   Number of days: 1209          
End Of Shift Note  [facility]  Summary of shift: Patient; has been up in chair intermittently throughout the day, family visited.  Tolerated external urinary catheter. Awaiting precert for [facility]    Vitals:    Vitals:    03/30/24 1124 03/30/24 1200 03/30/24 1600 03/30/24 1647   BP: (!) 158/72 (!) 142/68  (!) 159/58   Pulse: 60 57 84 67   Resp:       Temp:  98.6 °F (37 °C) 97.5 °F (36.4 °C)    TempSrc:  Temporal Temporal    SpO2: 91%      Weight:       Height:            I&O:   Intake/Output Summary (Last 24 hours) at 3/30/2024 1942  Last data filed at 3/30/2024 1855  Gross per 24 hour   Intake --   Output 2450 ml   Net -2450 ml       Resp Status: respirations easy on room air.    Ventilator Settings:     / / /     Critical Care IV infusions:   sodium chloride      dextrose          LDA:   Peripheral IV 03/23/24 Right Forearm (Active)   Number of days: 7       External Urinary Catheter (Active)   Number of days: 0       Wound Arm Lower;Posterior;Right (Active)   Number of days:        Incision 12/03/20 Foot Right (Active)   Number of days: 1213          
End Of Shift Note  [facility]  Summary of shift: Pt had an restless night. Pt was continuing to be agitated and wanting to leave when writer or staff left the room. Sitter was placed for pt safety. Pt was on and off trying to get out of bed and refusing care until pt went to the commode successfully. Pt was able to fall asleep around 0430 and has been resting since. Heart rate has been going down into the 35-40 when asleep, providers aware. See previous notes.     Vitals:    Vitals:    03/26/24 0400 03/26/24 0441 03/26/24 0450 03/26/24 0553   BP: (!) 159/86 (!) 159/86     Pulse: 69 53 57    Resp: 18      Temp: 97.3 °F (36.3 °C)      TempSrc: Temporal      SpO2: 94% 95% 93%    Weight:    113.8 kg (250 lb 14.4 oz)   Height:            I&O:   Intake/Output Summary (Last 24 hours) at 3/26/2024 0637  Last data filed at 3/26/2024 0450  Gross per 24 hour   Intake --   Output 1350 ml   Net -1350 ml       Resp Status: RA    Ventilator Settings:     / / /     Critical Care IV infusions:   sodium chloride      dextrose          LDA:   Peripheral IV 03/23/24 Left Forearm (Active)   Number of days: 2       External Urinary Catheter (Active)   Number of days: 1       Incision 12/03/20 Foot Right (Active)   Number of days: 1209         
End Of Shift Note  [facility]  Summary of shift: Pt had an restless night. Pt was pleasant throughout the shift otherwise. Pt has been urinating frequently and finding the bed uncomfortable.      Vitals:    Vitals:    03/27/24 2003 03/27/24 2025 03/28/24 0000 03/28/24 0400   BP:  (!) 150/79 125/61 (!) 155/69   Pulse:  64 55 66   Resp:  18 16 18   Temp: 97.3 °F (36.3 °C) 97.3 °F (36.3 °C) 97.6 °F (36.4 °C) 97.7 °F (36.5 °C)   TempSrc: Temporal Temporal Temporal Temporal   SpO2:  98% 97% 98%   Weight:    114.8 kg (253 lb)   Height:            I&O:   Intake/Output Summary (Last 24 hours) at 3/28/2024 0634  Last data filed at 3/28/2024 0400  Gross per 24 hour   Intake 10 ml   Output 200 ml   Net -190 ml       Resp Status: RA    Ventilator Settings:     / / /     Critical Care IV infusions:   sodium chloride      dextrose          LDA:   Peripheral IV 03/23/24 Right Forearm (Active)   Number of days: 4       Wound Arm Lower;Posterior;Right (Active)   Number of days:        Incision 12/03/20 Foot Right (Active)   Number of days: 1211         
End Of Shift Note  [facility]  Summary of shift: Pt had uneventful night. H did not c/o pain. He had daughter at bedside throughout the night. Pt has hx of loose stools and had 3 episodes of loose stools during the night. He was up to the commode with 2 assist. Pt slept most of the night and all labs and results were communicated with on call provider. At the time of he note the pt was in bed with call light within reach with eyes closed.     Vitals:    Vitals:    03/24/24 0200 03/24/24 0300 03/24/24 0400 03/24/24 0500   BP: 125/72 (!) 132/59 139/68 126/67   Pulse: 71 71 70 62   Resp:       Temp:   97.7 °F (36.5 °C)    TempSrc:   Temporal    SpO2: 92% 91% 94% 95%   Weight:   107.2 kg (236 lb 6.4 oz)    Height:            I&O:   Intake/Output Summary (Last 24 hours) at 3/24/2024 0527  Last data filed at 3/24/2024 0158  Gross per 24 hour   Intake 720 ml   Output 225 ml   Net 495 ml       Resp Status: Room air    Ventilator Settings:     / / /     Critical Care IV infusions:   sodium chloride 75 mL/hr at 03/23/24 1523    sodium chloride      dextrose          LDA:   Peripheral IV 03/23/24 Left Forearm (Active)   Number of days: 0       Incision 12/03/20 Foot Right (Active)   Number of days: 1207          
End Of Shift Note  [facility]  Summary of shift: Pt had uneventful shift this evening. [facility]charge pending for [facility], [facility], or home with home care. Goal today is to continue POC    Vitals:    Vitals:    04/06/24 0100 04/06/24 0400 04/06/24 0430 04/06/24 0500   BP: 129/69 (!) 117/50  (!) 102/53   Pulse: 61   60   Resp: 22      Temp:  97.5 °F (36.4 °C)     TempSrc:  Temporal     SpO2:       Weight:   119 kg (262 lb 6.4 oz)    Height:            I&O:   Intake/Output Summary (Last 24 hours) at 4/6/2024 0645  Last data filed at 4/5/2024 1545  Gross per 24 hour   Intake --   Output 150 ml   Net -150 ml       Resp Status: Room air. C/D lung sounds    Ventilator Settings:     / / /     Critical Care IV infusions:   sodium chloride      dextrose          LDA:   Peripheral IV 03/23/24 Right Forearm (Active)   Number of days: 13       External Urinary Catheter (Active)   Number of days: 0       Wound Arm Lower;Posterior;Right (Active)   Number of days:        Incision 12/03/20 Foot Right (Active)   Number of days: 1220         
End Of Shift Note  [facility]  Summary of shift: Pt had uneventful shift, Frequent urination and urgency w some incontinence. No mental status changes, has been A&O x4, no agitation this shift, pt is impulsive and forgetful of own safety restrictions. Awaiting for possible d/c to [facility]    Vitals:    Vitals:    03/28/24 1100 03/28/24 1205 03/28/24 1545 03/28/24 1600   BP: (!) 152/65  (!) 160/70    Pulse: 76      Resp:       Temp:  97.8 °F (36.6 °C) 97.6 °F (36.4 °C)    TempSrc:  Temporal Temporal    SpO2: 98%   95%   Weight:       Height:            I&O:   Intake/Output Summary (Last 24 hours) at 3/28/2024 1755  Last data filed at 3/28/2024 1709  Gross per 24 hour   Intake 10 ml   Output 2350 ml   Net -2340 ml       Resp Status: RA    Ventilator Settings:     / / /     Critical Care IV infusions:   sodium chloride      dextrose          LDA:   Peripheral IV 03/23/24 Right Forearm (Active)   Number of days: 5       Wound Arm Lower;Posterior;Right (Active)   Number of days:        Incision 12/03/20 Foot Right (Active)   Number of days: 1211          
End Of Shift Note  [facility]  Summary of shift: Intermittently trying to get out of bed, confused at times. VSS. No BM. 475ml UOP + one moderately saturated brief. Awaiting percert to [facility].    Vitals:    Vitals:    04/03/24 0010 04/03/24 0100 04/03/24 0220 04/03/24 0400   BP:  (!) 148/64  (!) 158/87   Pulse:  76 71 61   Resp: 20   18   Temp: 97.3 °F (36.3 °C)   97.4 °F (36.3 °C)   TempSrc: Temporal   Temporal   SpO2: 94%  94% 93%   Weight:    115.1 kg (253 lb 11.2 oz)   Height:            I&O:   Intake/Output Summary (Last 24 hours) at 4/3/2024 0729  Last data filed at 4/3/2024 0120  Gross per 24 hour   Intake --   Output 1025 ml   Net -1025 ml       Resp Status: RA    Ventilator Settings:     / / /     Critical Care IV infusions:   sodium chloride      dextrose          LDA:   Peripheral IV 03/23/24 Right Forearm (Active)   Number of days: 10       External Urinary Catheter (Active)   Number of days: 3       Wound Arm Lower;Posterior;Right (Active)   Number of days:        Incision 12/03/20 Foot Right (Active)   Number of days: 1217          
[facility]  Consult Note       NAME:  Keith Oreilly  MEDICAL RECORD NUMBER:  [id number]  AGE: 83 y.o.   GENDER: male  : 1940  TODAY'S DATE:  3/27/2024    Subjective:      Keith Oreilly is a 83 y.o. male with inpatient referral to [facility] for:  \"Right lower arm wound with drainage, post fall at home\"       Wound Identification:  Wound Type: traumatic  Contributing Factors: decreased mobility    Wound History: patient with reported fall at home with resulting injury to right arm. Per report initially site was dressed with foam dressing. Today it was noted to be open with drainage. WOC nurse consulted for dressing recommendations  Current Wound Care Treatment:  oil gauze, ABD, roll gauze     Patient Goal of Care:  [x] Wound Healing  [] Odor Control  [] Palliative Care  [] Pain Control   [] Other:         PAST MEDICAL HISTORY        Diagnosis Date    Arthritis     Atrial fibrillation (HCC)     CAD (coronary artery disease)     no stents    CHF (congestive heart failure) (HCC)     patient denies    Diabetes (HCC)     type 2    Diverticulosis     Hyperlipidemia     Obesity     АННА on CPAP     nightly    Poor historian     Renal stone     passed       PAST SURGICAL HISTORY    Past Surgical History:   Procedure Laterality Date    APPENDECTOMY      BUNIONECTOMY Right 12/3/2020    RIGHT 5TH METHEAD CONDYLECTOMY performed by Hugh [facility]el [facility] at [facility] OR    CARDIAC CATHETERIZATION  2020    : LEFT HEART CATH / LV   CORS    CARDIAC SURGERY      quad bypass    COLONOSCOPY      JOINT REPLACEMENT      patient denies    OTHER SURGICAL HISTORY      tumor removed from rib       FAMILY HISTORY    Family History   Problem Relation Age of Onset    Other Neg Hx         blood clots       SOCIAL HISTORY    Social History     Tobacco Use    Smoking status: Former     Current packs/day: 0.00     Types: Cigarettes     Quit date: 1975     Years since quittin.2    
Message sent to AUGUSTINE Keating to notify that the pts MRI results were back and give results. Awaiting response.     0159--message received that she received the results. No new orders  
Message sent to on call provider to see if they will order labs for the patient as none were taken in ED. As well as a right arm xray as the daughter is concerned that he fell on it and she does not know ho hard he fell. Awaiting response.     --1040p  response received to order BMP and right arm xray.   
Occupational Therapy  DATE: 3/27/2024    NAME: Keith Oreilly  MRN: [id number]   : 1940    Patient not seen this date for Occupational Therapy due to:      [] Cancel by RN or physician due to:    [] Hemodialysis    [] Critical Lab Value Level     [] Blood transfusion in progress    [] Acute or unstable cardiovascular status   _MAP < 55 or more than >115  _HR < 40 or > 130    [] Acute or unstable pulmonary status   -FiO2 > 60%   _RR < 5 or >40    _O2 sats < 85%    [] Strict Bedrest    [] Off Unit for surgery or procedure    [] Off Unit for testing       [x] Pending imaging to R/O fracture: CT of c-spine; treatment on hold. OT will cont to follow.    [] Refusal by Patient      [] Other      [] OT being discontinued at this time. Patient independent. No further needs.     [] OT being discontinued at this time as the patient has been transferred to hospice care. No further needs.      Jes Mcghee JAMES   
Occupational Therapy  Facility/Department: [facility]  Rehabilitation Occupational Therapy Daily Treatment Note    Date: 24  Patient Name: Keith Oreilly       Room:   MRN: [id number]  Account: [id number]   : 1940  (83 y.o.) Gender: male        Pt currently functioning below baseline.  Recommend daily inpatient skilled therapy at time of discharge to maximize long term outcomes and prevent re-admission. Please refer to AM-PAC score for current level of function.                Past Medical History:  has a past medical history of Arthritis, Atrial fibrillation (HCC), CAD (coronary artery disease), CHF (congestive heart failure) (HCC), Diabetes (HCC), Diverticulosis, Hyperlipidemia, Obesity, АННА on CPAP, Poor historian, and Renal stone.  Past Surgical History:   has a past surgical history that includes Appendectomy; other surgical history; Colonoscopy; Cardiac surgery; Cardiac catheterization (2020); joint replacement; and Bunionectomy (Right, 12/3/2020).    Restrictions  Restrictions/Precautions: General Precautions, Fall Risk, Bed Alarm, Up as Tolerated  Other position/activity restrictions: Telemetry, ALARMS, dementia, cont SPO2 monitor  Required Braces or Orthoses?: No    Subjective  Subjective: Pt in bed upon arrival and agreeable to therapy.  Restrictions/Precautions: General Precautions;Fall Risk;Bed Alarm;Up as Tolerated             Objective     Cognition  Overall Cognitive Status: Exceptions  Arousal/Alertness: Appropriate responses to stimuli;Delayed responses to stimuli  Following Commands: Follows one step commands with increased time;Follows one step commands with repetition  Attention Span: Attends with cues to redirect;Difficulty attending to directions  Memory: Decreased recall of biographical Information;Decreased recall of precautions;Decreased recall of recent events;Decreased short term memory  Safety Judgement: Decreased awareness of need for assistance;Decreased 
Occupational Therapy  Facility/Department: [facility]  Rehabilitation Occupational Therapy Daily Treatment Note    Date: 24  Patient Name: Keith Oreilly       Room:   MRN: [id number]  Account: [id number]   : 1940  (83 y.o.) Gender: male        Pt currently functioning below baseline.  Recommend daily inpatient skilled therapy at time of discharge to maximize long term outcomes and prevent re-admission. Please refer to AM-PAC score for current level of function.                Past Medical History:  has a past medical history of Arthritis, Atrial fibrillation (HCC), CAD (coronary artery disease), CHF (congestive heart failure) (HCC), Diabetes (HCC), Diverticulosis, Hyperlipidemia, Obesity, АННА on CPAP, Poor historian, and Renal stone.  Past Surgical History:   has a past surgical history that includes Appendectomy; other surgical history; Colonoscopy; Cardiac surgery; Cardiac catheterization (2020); joint replacement; and Bunionectomy (Right, 12/3/2020).    Restrictions  Restrictions/Precautions: General Precautions, Fall Risk, Bed Alarm, Up as Tolerated  Other position/activity restrictions: Telemetry, ALARMS, dementia, cont SPO2 monitor  Required Braces or Orthoses?: No    Subjective  Subjective: Pt in bed upon arrival and agreeable to therapy.  Restrictions/Precautions: General Precautions;Fall Risk;Bed Alarm;Up as Tolerated             Objective     Cognition  Overall Cognitive Status: Exceptions  Arousal/Alertness: Appropriate responses to stimuli;Delayed responses to stimuli  Following Commands: Follows one step commands with increased time;Follows one step commands with repetition  Attention Span: Attends with cues to redirect;Difficulty attending to directions  Memory: Decreased recall of biographical Information;Decreased recall of precautions;Decreased recall of recent events;Decreased short term memory  Safety Judgement: Decreased awareness of need for assistance;Decreased 
Occupational Therapy  Facility/Department: [facility]  Rehabilitation Occupational Therapy Daily Treatment Note    Date: 3/29/24  Patient Name: Keith Oreilly       Room:   MRN: [id number]  Account: [id number]   : 1940  (83 y.o.) Gender: male     Past Medical History:  has a past medical history of Arthritis, Atrial fibrillation (HCC), CAD (coronary artery disease), CHF (congestive heart failure) (HCC), Diabetes (HCC), Diverticulosis, Hyperlipidemia, Obesity, АННА on CPAP, Poor historian, and Renal stone.  Past Surgical History:   has a past surgical history that includes Appendectomy; other surgical history; Colonoscopy; Cardiac surgery; Cardiac catheterization (2020); joint replacement; and Bunionectomy (Right, 12/3/2020).    Restrictions  Restrictions/Precautions: General Precautions, Fall Risk, Bed Alarm, Up as Tolerated  Other position/activity restrictions: Ex cath, Telemetry, IV RUE, ALARMS, dementia.  Required Braces or Orthoses?: No    Subjective  Subjective: Pt. sitting in recliner and stated he was agreeable for treatment. After treatment started, pt. began to yell at therapy assistant to get out of room d/t not wanting to follow directions and attempt using toilet for toileting task. Pt. stated \"GET OUT OF MY ROOM!\"  Restrictions/Precautions: General Precautions;Fall Risk;Bed Alarm;Up as Tolerated  Objective     Cognition  Overall Cognitive Status: Exceptions  Arousal/Alertness: Inconsistent responses to stimuli;Delayed responses to stimuli  Following Commands: Follows one step commands with repetition  Attention Span: Difficulty attending to directions;Difficulty dividing attention  Memory: Decreased recall of biographical Information;Decreased recall of precautions;Decreased recall of recent events;Decreased short term memory;Decreased long term memory  Safety Judgement: Decreased awareness of need for assistance;Decreased awareness of need for safety  Problem Solving: 
Occupational Therapy  Facility/Department: [facility]  Rehabilitation Occupational Therapy Daily Treatment Note    Date: 3/25/24  Patient Name: Keith Oreilly       Room:   MRN: [id number]  Account: [id number]   : 1940  (83 y.o.) Gender: male      CELENA Mcgarry reports patient is medically stable for therapy treatment this date. Chart reviewed prior to treatment and patient is agreeable for therapy.  All lines intact and patient positioned comfortably at end of treatment.  All patient needs addressed prior to ending therapy session.      Pt currently functioning below baseline.  Recommend daily inpatient skilled therapy at time of discharge to maximize long term outcomes and prevent re-admission. Please refer to AM-PAC score for current level of function.               Past Medical History:  has a past medical history of Arthritis, Atrial fibrillation (HCC), CAD (coronary artery disease), CHF (congestive heart failure) (HCC), Diabetes (HCC), Diverticulosis, Hyperlipidemia, Obesity, АННА on CPAP, Poor historian, and Renal stone.  Past Surgical History:   has a past surgical history that includes Appendectomy; other surgical history; Colonoscopy; Cardiac surgery; Cardiac catheterization (2020); joint replacement; and Bunionectomy (Right, 12/3/2020).    Restrictions  Restrictions/Precautions: General Precautions, Fall Risk, Bed Alarm, Up as Tolerated  Other position/activity restrictions: Ex cath, Telemetry, IV RUE, ALARMS, dementia.  Required Braces or Orthoses?: No    Subjective  Subjective: Pt sitting on toilet upon arrival with PT present. Pt agreeable to allowing writer to take over.  Restrictions/Precautions: General Precautions;Fall Risk;Bed Alarm;Up as Tolerated             Objective     Cognition  Overall Cognitive Status: Exceptions  Arousal/Alertness: Inconsistent responses to stimuli;Delayed responses to stimuli  Following Commands: Follows one step commands with repetition  Attention 
Occupational Therapy  Facility/Department: [facility]  Occupational Therapy Initial Assessment    Name: Keith Oreilly  : 1940  MRN: [id number]  Date of Service: 3/24/2024    RN reports patient is medically stable for therapy treatment this date.    Chart reviewed prior to treatment and patient is agreeable for therapy.  All lines intact and patient positioned comfortably at end of treatment.  All patient needs addressed prior to ending therapy session.      Discharge Recommendations:  Patient would benefit from continued therapy after discharge  Pt currently functioning below baseline.  Recommend daily inpatient skilled therapy at time of discharge to maximize long term outcomes and prevent re-admission. Please refer to AM-PAC score for current level of function.  OT Equipment Recommendations  Equipment Needed:  (CTA)    PER HPI: Keith Oreilly is a 83 y.o.  male who presents with Fall (Pt's daughter states she was called by her mother who informed her that pt had fallen out of bed upon arrival pt was found on floor but thought he was still in bed and did not recall falling pt has h/o dementia pt was brought to ed per [facility])  83-year-old gentleman presented to ER due to a fall.  Reportedly daughter was called by the mother and informed that patient had fallen out of bed and found on the floor.  Wife reported that when she got up in the morning she noticed that  was on the floor.  Patient has mild dementia.  Patient is a poor historian.    Patient denies any chest pain, fever, chills, shortness of breath, changes in urination, bowel habit or rash.    Patient admitted for further management      Patient Diagnosis(es): The primary encounter diagnosis was Lacunar infarction (HCC). Diagnoses of Hyperglycemia, Traumatic rhabdomyolysis, initial encounter (HCC), and Leukocytosis, unspecified type were also pertinent to this visit.  Past Medical History:  has a past medical history of Arthritis, Atrial 
Occupational Therapy  Facility/Department: [facility]  Daily Treatment Note  NAME: Keith Oreilly  : 1940  MRN: [id number]    Date of Service: 2024    RN reports patient is medically stable for therapy treatment this date.    Chart reviewed prior to treatment and patient is agreeable for therapy.  All lines intact and patient positioned comfortably at end of treatment.  All patient needs addressed prior to ending therapy session.      Discharge Recommendations:  Patient would benefit from continued therapy after discharge  Pt currently functioning below baseline.  Recommend daily inpatient skilled therapy at time of discharge to maximize long term outcomes and prevent re-admission. Please refer to AM-PAC score for current level of function.  OT Equipment Recommendations  Equipment Needed:  (CTA)    Patient Diagnosis(es): The primary encounter diagnosis was Lacunar infarction (HCC). Diagnoses of Hyperglycemia, Traumatic rhabdomyolysis, initial encounter (HCC), Leukocytosis, unspecified type, and Right arm weakness were also pertinent to this visit.     Assessment    Assessment: Pt participating in session this date. Pt completing BUE HEP with demo and cues for positoning. Pt complete AROM and PROM in RUE and LUE with resistance. Pt set up with oral care items to brush teeth this date. Upon looking for pts personal oral care items in personal bag, therapist located a large BIC stick lighter. Writer notified RN and writer placed lighter into pt lock box locking it behind. Pt would benefit from continued OT to increase indep with ADLs/IADLs for d/c.  Activity Tolerance: Patient limited by pain;Patient tolerated treatment well;Treatment limited secondary to decreased cognition  Discharge Recommendations: Patient would benefit from continued therapy after discharge  Equipment Needed:  (CTA)      Plan   Occupational Therapy Plan  Times Per Week: 5x/wk, 1x/day  Current Treatment Recommendations: 
One to one sitter removed and patient calm, and cooperative.   
Patient placed on home cpap.  
Patient refusing to use home cpap.  
Patient was A&O x4 upon arrival of shift, but became disoriented and agitated throughout the night. Patient was often restless getting out of bed and pulling external catheter off, but able to be redirected and reoriented. Melatonin 3mg given. VS stable on room air; patient was too disoriented and confused to do bipap overnight. Ambulated with walker and 1 assist. Denies any pain at this time.     Urine output 900mL. Na lab level 131 this morning; Femi NP notified.  
Physical Therapy  DATE: 2024    NAME: Keith Oreilly  MRN: [id number]   : 1940    Patient not seen this date for Physical Therapy due to:      [] Cancel by RN or physician due to:    [] Hemodialysis    [] Critical Lab Value Level     [] Blood transfusion in progress    [] Acute or unstable cardiovascular status   _MAP < 55 or more than >115  _HR < 40 or > 130    [] Acute or unstable pulmonary status   -FiO2 > 60%   _RR < 5 or >40    _O2 sats < 85%    [] Strict Bedrest    [] Off Unit for surgery or procedure    [] Off Unit for testing       [] Pending imaging to R/O fracture    [x] Refusal by Patient (1st attempt patient declined d/t fatigue and requested writer to return in about an hour. 2nd attempt patient getting cleaned up by staff. Will cont to follow)      [x] Other      [] PT being discontinued at this time. Patient independent. No further needs.     [] PT being discontinued at this time as the patient has been transferred to hospice care. No further needs.      Emma Collins, PTA     
Physical Therapy  DATE: 3/27/2024    NAME: Keith Oreilly  MRN: [id number]   : 1940    Patient not seen this date for Physical Therapy due to:      [] Cancel by RN or physician due to:    [] Hemodialysis    [] Critical Lab Value Level     [] Blood transfusion in progress    [] Acute or unstable cardiovascular status   _MAP < 55 or more than >115  _HR < 40 or > 130    [] Acute or unstable pulmonary status   -FiO2 > 60%   _RR < 5 or >40    _O2 sats < 85%    [] Strict Bedrest    [] Off Unit for surgery or procedure    [] Off Unit for testing       [x] Pending imaging to R/O fracture       3/27 cx in am; pending CT of C-spine (CELENA Marin notified therapy on hold until cleared)     [] Refusal by Patient      [] Other      [] PT being discontinued at this time. Patient independent. No further needs.     [] PT being discontinued at this time as the patient has been transferred to hospice care. No further needs.      Neha Yates, PT     
Physical Therapy  Facility/Department: [facility] CVICU  Daily Treatment Note  NAME: Keith Oreilly  : 1940  MRN: [id number]    Date of Service: 3/26/2024    Discharge Recommendations:  Patient would benefit from continued therapy after discharge   PT Equipment Recommendations  Equipment Needed: Yes (shower bench)  Other: shower bench    Patient Diagnosis(es): The primary encounter diagnosis was Lacunar infarction (HCC). Diagnoses of Hyperglycemia, Traumatic rhabdomyolysis, initial encounter (HCC), and Leukocytosis, unspecified type were also pertinent to this visit.    Assessment   Pt continues with improved motor control this date as compared to yesterday. Pt able to stabilize weight w/ standing and advance LE's for gait without leg buckling this date. Patient did demonstrate quad fatigue with repititive standing exercises and would suggest blocking knee from flexion during gait. Will continue to progress.     Activity Tolerance: Patient tolerated treatment well  Equipment Needed: Yes (shower bench)  Other: shower bench     Plan    Physical Therapy Plan  General Plan: 5-7 times per week  Specific Instructions for Next Treatment: gait; standing balance  Current Treatment Recommendations: Strengthening;Balance training;Functional mobility training;Transfer training;Stair training;Gait training;Home exercise program;Safety education & training;Co-Treatment;Therapeutic activities     Restrictions  Restrictions/Precautions  Restrictions/Precautions: General Precautions, Fall Risk, Bed Alarm, Up as Tolerated  Required Braces or Orthoses?: No  Position Activity Restriction  Other position/activity restrictions: Ex cath, Telemetry, IV RUE, ALARMS, dementia.     Subjective    Subjective  Subjective: Pt reports he doesn't have control of his bowels today.  Pt otherwise is motivated, joking throughout session and was able to retain some education from the prior day.  Pt daughter supportive and visiting again today. She steps 
Physical Therapy  Facility/Department: [facility]  Physical Therapy Initial Assessment    Name: Keith Oreilly  : 1940  MRN: [id number]  Date of Service: 3/24/2024    Discharge Recommendations:  Patient would benefit from continued therapy after discharge   PT Equipment Recommendations  Equipment Needed: Yes  Other: shower bench    HPI copied from chart:  Chief Complaint   Patient presents with    Fall       Pt's daughter states she was called by her mother who informed her that pt had fallen out of bed upon arrival pt was found on floor but thought he was still in bed and did not recall falling pt has h/o dementia pt was brought to ed per [facility]      HISTORY OF PRESENT ILLNESS   This is an 83-year-old female who presents the emergency department with complaints of being found on the floor.  The patient has a history of some mild dementia, lives at home with his wife, when she got up this morning and noted that the patient was on the floor and had been on the floor throughout the evening.  She states that he felt like he was actually in his bed.       Patient Diagnosis(es): The primary encounter diagnosis was Lacunar infarction (HCC). Diagnoses of Hyperglycemia, Traumatic rhabdomyolysis, initial encounter (HCC), and Leukocytosis, unspecified type were also pertinent to this visit.  Past Medical History:  has a past medical history of Arthritis, Atrial fibrillation (HCC), CAD (coronary artery disease), CHF (congestive heart failure) (HCC), Diabetes (HCC), Diverticulosis, Hyperlipidemia, Obesity, АННА on CPAP, Poor historian, and Renal stone.  Past Surgical History:  has a past surgical history that includes Appendectomy; other surgical history; Colonoscopy; Cardiac surgery; Cardiac catheterization (2020); joint replacement; and Bunionectomy (Right, 12/3/2020).    Assessment   Body Structures, Functions, Activity Limitations Requiring Skilled Therapeutic Intervention: Decreased functional mobility 
Physical Therapy  Facility/Department: [facility] CVICU  Daily Treatment Note  NAME: Keith Oreilly  : 1940  MRN: [id number]    Date of Service: 3/31/2024    Discharge Recommendations:  Patient would benefit from continued therapy after discharge        Patient Diagnosis(es): The primary encounter diagnosis was Lacunar infarction (HCC). Diagnoses of Hyperglycemia, Traumatic rhabdomyolysis, initial encounter (HCC), Leukocytosis, unspecified type, and Right arm weakness were also pertinent to this visit.    Assessment   Assessment: Patient able to perform bed mobility with min assist reaching for therapist to perform sitting EOB; patient requiring min assist to stand from elevated bed and MOD A to rise from toilet.  Patient tolerated static standing SBA using RW for hygiene tasks. Patient would benefit from continued therapy in order to return to PLOF  Activity Tolerance: Patient limited by fatigue; Treatment limited secondary to decreased cognition;Patient tolerated treatment well     Plan    Physical Therapy Plan  General Plan: 5-7 times per week  Current Treatment Recommendations: Strengthening; Balance training; Functional mobility training; Transfer training; Stair training; Gait training; Home exercise program; Safety education & training; Co-Treatment; Therapeutic activities     Restrictions  Restrictions/Precautions  Restrictions/Precautions: General Precautions, Fall Risk, Bed Alarm, Up as Tolerated  Required Braces or Orthoses?: No  Position Activity Restriction  Other position/activity restrictions: Ex cath, Telemetry, IV RUE, ALARMS, dementia.     Subjective    Subjective  Subjective: Patient in bed upon arrival with josue at bedside.  Karley reports patient appropiate for therapy. Patient agreeable,   Orientation  Overall Orientation Status: Impaired  Orientation Level: Oriented to person; Disoriented to place;D disoriented to time  Cognition  Overall Cognitive Status: Exceptions  Arousal/Alertness: 
Physical Therapy  Facility/Department: [facility] CVICU  Daily Treatment Note  NAME: Keith Oreilly  : 1940  MRN: [id number]    Date of Service: 3/26/2024    Discharge Recommendations:  Patient would benefit from continued therapy after discharge   PT Equipment Recommendations  Equipment Needed: Yes  Other: shower bench    Patient Diagnosis(es): The primary encounter diagnosis was Lacunar infarction (HCC). Diagnoses of Hyperglycemia, Traumatic rhabdomyolysis, initial encounter (HCC), and Leukocytosis, unspecified type were also pertinent to this visit.    Assessment   Assessment: Pt with improved motor control this date as compared to yesterday. Pt is engaged and follows commmands. Pt tretament cut short as patient needed to use restroom. Pt daughter present, supportive of her father. Pt reports she has siblings that are supportive as well. Will progress as able while hospitalized. Bring walker tomorrow for attempts at gait.    Activity Tolerance: Patient tolerated evaluation without incident  Equipment Needed: Yes  Other: shower bench     Plan    Physical Therapy Plan  General Plan: 5-7 times per week  Specific Instructions for Next Treatment: co tx for gait ; try bariatric walker  Current Treatment Recommendations: Strengthening;Balance training;Functional mobility training;Transfer training;Stair training;Gait training;Home exercise program;Safety education & training;Co-Treatment;Therapeutic activities     Restrictions  Restrictions/Precautions  Restrictions/Precautions: General Precautions, Fall Risk, Bed Alarm, Up as Tolerated  Required Braces or Orthoses?: No  Position Activity Restriction  Other position/activity restrictions: Ex cath, Telemetry, IV RUE, ALARMS, dementia.     Subjective    Subjective  Subjective: Pt reports \"he just has to use the restroom - he has never been so backed up before.\"     Objective   Vitals     Bed Mobility Training  Bed Mobility Training: Yes  Overall Level of Assistance: 
Physical Therapy  Facility/Department: [facility] CVICU  Daily Treatment Note  NAME: Keith Oreilly  : 1940  MRN: [id number]    Date of Service: 3/24/2024    Discharge Recommendations:  Patient would benefit from continued therapy after discharge        Patient Diagnosis(es): The primary encounter diagnosis was Lacunar infarction (HCC). Diagnoses of Hyperglycemia, Traumatic rhabdomyolysis, initial encounter (HCC), and Leukocytosis, unspecified type were also pertinent to this visit.    Assessment   Assessment: Pt presents on day 2 of his hospitalization with h/o dementia but is able to follow commands and report how he feels.  Pt able to grab Rt UE and place on bar to assist w/ sit to stand for sata stedy transfer.  Pt engaged through out treatment.  Would encourage staff to assist w/ transfers w/ conchita stedy as more safe w/ pt weakness level.  Will continue to progress.    Activity Tolerance: Patient tolerated treatment well  Equipment Needed: Yes  Other: shower bench     Plan    Physical Therapy Plan  General Plan: 5-7 times per week  Specific Instructions for Next Treatment: co tx; try bariatric walker  Current Treatment Recommendations: Strengthening;Balance training;Functional mobility training;Transfer training;Stair training;Gait training;Home exercise program;Safety education & training;Co-Treatment;Therapeutic activities     Restrictions  Restrictions/Precautions  Restrictions/Precautions: General Precautions, Fall Risk, Bed Alarm, Up as Tolerated  Required Braces or Orthoses?: No  Position Activity Restriction  Other position/activity restrictions: Ex cath, Telemetry, IV RUE, ALARMS, dementia.     Subjective    Subjective  Subjective: Pt reports reports he is thankful for our help.  Pt under no appearent distress.  Orientation  Overall Orientation Status: Impaired      Objective        Bed Mobility Training  Bed Mobility Training: Yes  Overall Level of Assistance: Assist X2;Maximum 
Physical Therapy  Facility/Department: [facility]  Rehabilitation Physical Therapy Treatment Note    NAME: Keith Oreilly  : 1940 (83 y.o.)  MRN: [id number]  CODE STATUS: Full Code    Date of Service: 24     Pt is currently functional below baseline and recommend comprehensive and intensive skilled therapy by a multidisciplinary team. Would expect patient to be able to tolerate 3 hours of therapy per day and able to tolerate at least one hour up in chair.  Please refer to AM-PAC score for current mobility/adl level.      Restrictions:  Restrictions/Precautions: General Precautions, Fall Risk, Bed Alarm, Up as Tolerated  Position Activity Restriction  Other position/activity restrictions: Telemetry, ALARMS, dementia, cont SPO2 monitor     SUBJECTIVE  Subjective  Subjective: Patient up in side chair upon therapists arrival. RN reports patient is medically stable for therapy treatment this date.    Chart reviewed prior to treatment and patient is agreeable for therapy.  All lines intact and patient positioned comfortably at end of treatment.  All patient needs addressed prior to ending therapy session.  Chair alarm in place and tested to ensure patient safety.    OBJECTIVE  Cognition  Overall Cognitive Status: Exceptions  Arousal/Alertness: Appropriate responses to stimuli;Delayed responses to stimuli  Following Commands: Follows one step commands with increased time;Follows one step commands with repetition  Attention Span: Attends with cues to redirect;Difficulty attending to directions  Memory: Decreased recall of biographical Information;Decreased recall of precautions;Decreased recall of recent events;Decreased short term memory  Safety Judgement: Decreased awareness of need for assistance;Decreased awareness of need for safety  Problem Solving: Assistance required to identify errors made;Assistance required to correct errors made;Assistance required to generate solutions;Assistance required to implement 
Physical Therapy  Facility/Department: [facility] CVICU  Daily Treatment Note  NAME: Keith Oreilly  : 1940  MRN: [id number]    Date of Service: 4/3/2024    Discharge Recommendations:  Patient would benefit from continued therapy after discharge    Pt is currently functional below baseline and recommend comprehensive and intensive skilled therapy by a multidisciplinary team. Would expect patient to be able to tolerate 3 hours of therapy per day and able to tolerate at least one hour up in chair.  Please refer to AM-PAC score for current mobility/adl level.      Patient Diagnosis(es): The primary encounter diagnosis was Lacunar infarction (HCC). Diagnoses of Hyperglycemia, Traumatic rhabdomyolysis, initial encounter (HCC), Leukocytosis, unspecified type, and Right arm weakness were also pertinent to this visit.    Assessment   Assessment: Patient presents with global deconditioning and requries increased time and effort for all bed mobility tasks this date. Patient is a CGA-MIN A for mobility tasks with RW. Required assistance for motor planning, sequencing, safety awareness and problem solving during treatment. Patient would benefit from continued skilled PT treatment to maximize return to PLOF  Activity Tolerance: Patient limited by fatigue;Patient limited by endurance;Treatment limited secondary to decreased cognition     Plan    Physical Therapy Plan  General Plan: 5-7 times per week  Specific Instructions for Next Treatment: gait; standing balance  Current Treatment Recommendations: Strengthening;Balance training;Functional mobility training;Transfer training;Stair training;Gait training;Home exercise program;Safety education & training;Co-Treatment;Therapeutic activities     Restrictions  Restrictions/Precautions  Restrictions/Precautions: General Precautions, Fall Risk, Bed Alarm, Up as Tolerated  Required Braces or Orthoses?: No  Position Activity Restriction  Other position/activity restrictions: Ex cath, 
Pt had pleasant evening, was up periodically to use restroom, ambulated cautiously with walker and standby assistance. He remained well oriented and required no behavioral redirection. Pt states rest as \"OK\" and has no complaints at this time.   
Pt has no complaints  Sleeping comfortably  A fib.  No pauses.   Mildly hypertensive  No JVD  Lungs are clear  CV irregular   No peripheral edema    Renal function is normal.  Will increase losartan for better BP control.    
Pt has no specific complaint  No chest pain or dyspnea  A fib  no pauses  Borderline hypertensive  No evidence of fluid overload.    Will add low dose doxazosin  Continue eliquis for A fib  CV status is stable.  
Pt is feeling better  No chest pain or dyspnea  A fib with VR 50-60s  BP is better controlled  No JVD  Lungs are clear  CV irregular  Soft DARVIN  Minimal peripheral edema      Continue current cardiac Rx  Pt is not a candidate for anticoagulation.  CV status is stable.     
RT noticed pt wasn't wearing his home CPAP. RT asked pt if he needed help with his CPAP, but pt refusing his CPAP tonight.  
SLP ALL NOTES  Facility/Department: [facility] CVICU  Initial Speech/Language/Cognitive Assessment    NAME: Keith Oreilly  : 1940   MRN: [id number]  ADMISSION DATE: 3/23/2024    ADMITTING DIAGNOSIS: has Dupuytren's contracture of left hand; Tailor's bunion of right foot; CVA (cerebrovascular accident due to intracerebral hemorrhage) (HCC); Encephalopathy; Cognitive impairment; Leukocytosis; Lacunar infarction (HCC); Fall; and Right arm weakness on their problem list.    DATE ONSET: 3/23/2024      Date of Eval: 3/30/2024   Evaluating Therapist: TOOTIE Moore      RECENT RESULTS  CT OF HEAD/MRI:   MRI Brain - 3/23/2024  1.  No acute intracranial abnormality.  No acute stroke.     2.  Moderate chronic small vessel ischemic disease.     3.  Small right caudate old lacunar infarct.     4.  Suspected focal 50-60% stenosis in the proximal right cervical ICA per  NASCET criteria, not well evaluated due to motion artifact        Primary Complaint:   Keith Oreilly is an 82 yo man with mild dementia who presented to the ED after being found ont he floor.     Head CT with left hippocampal attenuation possible lacune with bilateral chronic periventricular small vessel ischemia .         IMPRESSIONS  Pt is exhibiting moderate cognitive impairments    He would benefit from some cognitive stimulation to delay disease progression          RECOMMENDATIONS  Cognitive rehabilitation via speech-language pathology intervention              Pain:  Pain Assessment  Pain Assessment: None - Denies Pain  Pain Level: 0  Non-Pharmaceutical Pain Intervention(s): Repositioned, Rest    Vision/ Hearing       Assessment:  Cognitive Diagnosis: Moderate Cognitive Impairments   Diagnosis: Moderate Cognitive Impairments    Recommendations:  Recommendations  Requires SLP Intervention: Yes  Patient Education Response: Demonstrated understanding;Needs reinforcement;No evidence of learning     D/C Recommendations: Ongoing speech therapy is 
SLP ALL NOTES  Speech Language Pathology  [facility]    Cognitive Treatment Note    Date: 4/2/2024  Patient’s Name: Keith Oreilly  MRN: [id number]  Diagnosis:   Patient Active Problem List   Diagnosis Code    Dupuytren's contracture of left hand M72.0    Tailor's bunion of right foot M21.621    CVA (cerebrovascular accident due to intracerebral hemorrhage) ([facility]) I61.9    Encephalopathy G93.40    Cognitive impairment R41.89    Leukocytosis D72.829    Lacunar infarction ([facility]) I63.81    Fall W19.XXXA    Right arm weakness R29.898       Pain: 0/10    Cognitive Treatment    Treatment time: 7720-1912      Subjective: [x] Alert [x] Cooperative     [] Confused     [] Agitated    [] Lethargic      Objective/Assessment:    Recall: Delayed recall: 0/3 increased to 3/3 with max verbal cues (x2 trials), 1/3 increased to 3/3 with mod-max verbal cues (x1)    Simple paragraphs- recalling facts: 7/9 increased to 9/9 with mod verbal cues    Organization: Generative naming concrete: +5 increased to +8 with mod verbal cues    Problem Solving/Reasoning: Word deductions: 12/14 increased to 14/14     Other: Pt. Provided with verbal education regarding memory compensatory strategies. Pt. Encouraged to utilize strategies once discharged from the hospital. Pt. Verbalized understanding.      Plan:  [x] Continue ST services    [] Discharge from ST:      Discharge recommendations: []  Further therapy recommended at discharge.The patient should be able to tolerate at least 3 hours of therapy per day over 5 days or 15 hours over 7 days. [x] Further therapy recommended at discharge.   [] No therapy recommended at discharge.          Treatment completed by: Alix Palm, SLP, M.S. CCC-SLP    
SPIRITUAL CARE DEPARTMENT [facility]  PROGRESS NOTE    Room # 2035/2035-01   Name: Keith Oreilly              Reason for visit: Routine    I visited the patient.    Admit Date & Time: 3/23/2024  8:59 AM    Assessment:  Keith Oreilly is a 83 y.o. male.  Upon entering the room patient was resting with aid at bedside. RN states PT: was agitated and is now on watch. PT: was calm, open to prayer. No other needs expressed.      Intervention:   provided a ministry presence and brief prayer.    Outcome:  Patient grateful     Plan:  Chaplains will remain available to offer spiritual and emotional support as needed.    Electronically signed by Chaplain Daxa, on 3/26/2024 at 11:46 AM.  Spiritual Care Department  [facility]      03/26/24 1144   Encounter Summary   Service Provided For: Patient   Referral/Consult From: Rounding   Support System Spouse   Last Encounter  03/26/24   Complexity of Encounter Moderate   Begin Time 1040   End Time  1045   Total Time Calculated 5 min   Assessment/Intervention/Outcome   Assessment Calm;Unable to assess   Intervention Discussed belief system/Restoration practices/[place]an[place];Prayer (assurance of)/[place];Sustaining Presence/Ministry of presence   Outcome Engaged in conversation;Expressed feelings, needs, and concerns;Expressed Gratitude       
Speech Language Pathology  [facility]    Cognitive Treatment Note    Date: 4/1/2024  Patient’s Name: Keith Oreilly  MRN: [id number]  Patient Active Problem List   Diagnosis Code    Dupuytren's contracture of left hand M72.0    Tailor's bunion of right foot M21.621    CVA (cerebrovascular accident due to intracerebral hemorrhage) ([facility]) I61.9    Encephalopathy G93.40    Cognitive impairment R41.89    Leukocytosis D72.829    Lacunar infarction ([facility]) I63.81    Fall W19.XXXA    Right arm weakness R29.898       Pain: 0/10    Cognitive Treatment    Treatment time: 3597-0969      Subjective: [x] Alert [x] Cooperative     [] Confused     [] Agitated    [] Lethargic      Objective/Assessment:    Recall: Delayed recall of 3 units related: 2/3 increased to 3/3 with max verbal cues, 1/3 increased to 3/3 with min-mod verbal cues     Word List Retention, category inclusion: 14/20 increased to 20/20 with repetition and min verbal cues     Immediate recall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 medical condition->Emergency Medical Condition (MA)  Reason for Exam: pt. found on floor this am; pt. has advanced dementia; best  exams possible-pt. would not hold still or hold breath for exams    FINDINGS:  BRAIN/VENTRICLES:  No masses nor acute intracranial hemorrhage.  Age-indeterminate lacunar infarct suggested near the left hippocampus.  Remote lacunar infarct involving the right basal ganglia.  Otherwise intact  gray/white matter differentiation.  No mass effect nor midline shift.  Patent  basilar cisterns and foramen magnum.  No hydrocephalus.  Age-appropriate mild  diffuse atrophy.  Mild deep and periventricular white matter hypodensities  likely due to chronic small vessel ischemia.    ORBITS:  Bilateral lens implants.  Symmetric bilateral proptosis potentially  normal for the patient.    SINUSES:  No acute abnormality.  Aplastic bilateral frontal sinuses.    SOFT TISSUES/SKULL:  No obvious acute soft tissue abnormality.  
in no acute distress  Mental status - oriented to person, place, and time with normal affect  Head - normocephalic and atraumatic  Eyes - pupils equal and reactive, extraocular eye movements intact, conjunctiva clear  Ears - hearing appears to be intact  Nose - no drainage noted  Mouth - mucous membranes moist  Neck - supple, no carotid bruits, thyroid not palpable  Chest - clear to auscultation, normal effort  Heart - normal rate, regular rhythm, no murmur  Abdomen - soft, nontender, nondistended, bowel sounds present all four quadrants, no masses, hepatomegaly or splenomegaly  Neurological - normal speech, no focal findings or movement disorder noted, cranial nerves II through XII grossly intact  Extremities - peripheral pulses palpable, no pedal edema or calf pain with palpation  Skin - no gross lesions, rashes, or induration noted        Data:     Labs:    Hematology:  Recent Labs     04/01/24  0329 04/02/24  0502 04/03/24  0408   WBC 7.6 7.0 8.0   RBC 4.53 5.24 4.56   HGB 12.5* 14.3 12.4*   HCT 38.5* 43.9 38.7*   MCV 85.0 83.8 84.9   MCH 27.6 27.3 27.2   MCHC 32.5 32.6 32.0   RDW 15.4* 15.3* 15.3*    225 247   MPV 9.4 9.3 9.2       Chemistry:  Recent Labs     04/01/24  0329 04/02/24  0502 04/03/24  0408    136 135   K 4.1 4.4 4.0    103 101   CO2 24 21 25   GLUCOSE 204* 207* 183*   BUN 20 23 21   CREATININE 0.8 1.0 1.0   ANIONGAP 9 12 9   LABGLOM 88 75 75   CALCIUM 8.9 8.9 8.7   CKTOTAL 60 64 50   MYOGLOBIN 55 58 59       Recent Labs     04/02/24  0736 04/02/24  1210 04/02/24  1606 04/02/24  2054 04/03/24  0731 04/03/24  1125   POCGLU 187* 152* 174* 210* 191* 169*         Lab Results   Component Value Date    INR 1.1 03/23/2024    PROTIME 14.1 03/23/2024       No results found for: \"SPECIAL\"  Lab Results   Component Value Date/Time    CULTURE NO GROWTH 5 DAYS 03/23/2024 11:05 AM       No results found for: \"POCPH\", \"PHART\", \"PH\", \"POCPCO2\", \"SAN0QNF\", \"PCO2\", \"POCPO2\", \"PO2ART\", \"PO2\", 
\"PHART\", \"PH\", \"POCPCO2\", \"XGB4WLH\", \"PCO2\", \"POCPO2\", \"PO2ART\", \"PO2\", \"POCHCO3\", \"YHG3ZKV\", \"HCO3\", \"NBEA\", \"PBEA\", \"BEART\", \"BE\", \"THGBART\", \"THB\", \"SDJ3UOM\", \"SWMN0SMC\", \"R7FIPHQW\", \"O2SAT\", \"FIO2\"    Radiology:    CT LUMBAR SPINE WO CONTRAST    Addendum Date: 3/23/2024    ADDENDUM: For management of fusiform AAA: 3.0-3.4 cm AAA, recommend follow-up every 3 years. Note: Recommend Vascular consultation if a fusiform AAA enlarges by >0.5 cm in 6 months or >1 cm in 1 year or for a saccular AAA of any size. References: J Am [facility]ma Radiol 2013; 10(10):789-794; J Vasc Surg. 2018; 67:2-77     Result Date: 3/23/2024  1. No evidence of acute osseous abnormality of the lumbar spine. 2. 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 3. Mild 3.1 cm infrarenal AAA. 4. Diverticulosis without CT evidence diverticulitis. 5. Moderate retained stool rectosigmoid. 6. High density within GB, either vicarious contrast or milk of calcium.     CT HEAD WO CONTRAST    Result Date: 3/23/2024  1. Age-indeterminate lacunar infarct near the left hippocampus, potentially subacute to chronic with no available prior studies for comparison.  Critical results were called by Dr. Juloi Basilio MD to Dr. Umer Wade on 3/23/2024 at 11:28. 2. Chronic findings as above.     XR CHEST PORTABLE    Result Date: 3/23/2024  Bibasilar atelectasis and/or small effusions.  No focal consolidation.         All radiological studies reviewed                Code Status:  Full Code    Electronically signed by Eladio Loyd MD on 3/25/2024 at 7:34 PM     Copy sent to Chaparrita Hernández MD    This note was created with the assistance of a speech-recognition program.  Although the intention is to generate a document that actually reflects the content of the visit, no guarantees can be provided that every mistake has been identified and corrected by editing.     Note was updated later by me after  physical examination and  completion of the 
\"POCPO2\", \"PO2ART\", \"PO2\", \"POCHCO3\", \"ABL6RXV\", \"HCO3\", \"NBEA\", \"PBEA\", \"BEART\", \"BE\", \"THGBART\", \"THB\", \"QQG8IVF\", \"FQXX8YAB\", \"F3YILRFA\", \"O2SAT\", \"FIO2\"    Radiology:    CT LUMBAR SPINE WO CONTRAST    Addendum Date: 3/23/2024    ADDENDUM: For management of fusiform AAA: 3.0-3.4 cm AAA, recommend follow-up every 3 years. Note: Recommend Vascular consultation if a fusiform AAA enlarges by >0.5 cm in 6 months or >1 cm in 1 year or for a saccular AAA of any size. References: J Am Alma Radiol 2013; 10(10):789-794; J Vasc Surg. 2018; 67:2-77     Result Date: 3/23/2024  1. No evidence of acute osseous abnormality of the lumbar spine. 2. 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 3. Mild 3.1 cm infrarenal AAA. 4. Diverticulosis without CT evidence diverticulitis. 5. Moderate retained stool rectosigmoid. 6. High density within GB, either vicarious contrast or milk of calcium.     CT HEAD WO CONTRAST    Result Date: 3/23/2024  1. Age-indeterminate lacunar infarct near the left hippocampus, potentially subacute to chronic with no available prior studies for comparison.  Critical results were called by Dr. Julio Basilio MD to Dr. Umer Wade on 3/23/2024 at 11:28. 2. Chronic findings as above.     XR CHEST PORTABLE    Result Date: 3/23/2024  Bibasilar atelectasis and/or small effusions.  No focal consolidation.         All radiological studies reviewed                Code Status:  Full Code    Electronically signed by Maddie Hazel MD on 4/4/2024 at 1:11 PM     Copy sent to Chaparrita Hernández MD    This note was created with the assistance of a speech-recognition program.  Although the intention is to generate a document that actually reflects the content of the visit, no guarantees can be provided that every mistake has been identified and corrected by editing.     Note was updated later by me after  physical examination and  completion of the assessment.

## 2024-04-08 ENCOUNTER — HOSPITAL ENCOUNTER (OUTPATIENT)
Age: 84
Setting detail: SPECIMEN
Discharge: HOME | End: 2024-04-08

## 2024-04-08 LAB
ALBUMIN SERPL-MCNC: 3.2 G/DL (ref 3.5–5.2)
ALP SERPL-CCNC: 87 U/L (ref 40–129)
ALT SERPL-CCNC: 21 U/L (ref 5–41)
ANION GAP SERPL CALCULATED.3IONS-SCNC: 10 MMOL/L (ref 9–17)
AST SERPL-CCNC: 18 U/L
BASOPHILS # BLD: 0.08 K/UL (ref 0–0.2)
BASOPHILS NFR BLD: 1 % (ref 0–2)
BILIRUB SERPL-MCNC: 0.8 MG/DL (ref 0.3–1.2)
BUN SERPL-MCNC: 22 MG/DL (ref 8–23)
BUN/CREAT SERPL: 24 (ref 9–20)
CALCIUM SERPL-MCNC: 8.8 MG/DL (ref 8.6–10.4)
CHLORIDE SERPL-SCNC: 102 MMOL/L (ref 98–107)
CO2 SERPL-SCNC: 25 MMOL/L (ref 20–31)
CREAT SERPL-MCNC: 0.9 MG/DL (ref 0.7–1.2)
EOSINOPHIL # BLD: 0.44 K/UL (ref 0–0.44)
EOSINOPHILS RELATIVE PERCENT: 5 % (ref 1–4)
ERYTHROCYTE [DISTWIDTH] IN BLOOD BY AUTOMATED COUNT: 15.4 % (ref 11.8–14.4)
GFR SERPL CREATININE-BSD FRML MDRD: 85 ML/MIN/1.73M2
GLUCOSE SERPL-MCNC: 144 MG/DL (ref 70–99)
HCT VFR BLD AUTO: 36.1 % (ref 40.7–50.3)
HGB BLD-MCNC: 11.7 G/DL (ref 13–17)
IMM GRANULOCYTES # BLD AUTO: 0.05 K/UL (ref 0–0.3)
IMM GRANULOCYTES NFR BLD: 1 %
LYMPHOCYTES NFR BLD: 1.41 K/UL (ref 1.1–3.7)
LYMPHOCYTES RELATIVE PERCENT: 16 % (ref 24–43)
MAGNESIUM SERPL-MCNC: 1.8 MG/DL (ref 1.6–2.6)
MCH RBC QN AUTO: 27.4 PG (ref 25.2–33.5)
MCHC RBC AUTO-ENTMCNC: 32.4 G/DL (ref 28.4–34.8)
MCV RBC AUTO: 84.5 FL (ref 82.6–102.9)
MONOCYTES NFR BLD: 0.93 K/UL (ref 0.1–1.2)
MONOCYTES NFR BLD: 10 % (ref 3–12)
NEUTROPHILS NFR BLD: 67 % (ref 36–65)
NEUTS SEG NFR BLD: 6.07 K/UL (ref 1.5–8.1)
NRBC BLD-RTO: 0 PER 100 WBC
PHOSPHATE SERPL-MCNC: 3.3 MG/DL (ref 2.5–4.5)
PLATELET # BLD AUTO: 239 K/UL (ref 138–453)
PMV BLD AUTO: 9.9 FL (ref 8.1–13.5)
POTASSIUM SERPL-SCNC: 4.1 MMOL/L (ref 3.7–5.3)
PROT SERPL-MCNC: 6 G/DL (ref 6.4–8.3)
RBC # BLD AUTO: 4.27 M/UL (ref 4.21–5.77)
RBC # BLD: ABNORMAL 10*6/UL
SODIUM SERPL-SCNC: 137 MMOL/L (ref 135–144)
WBC OTHER # BLD: 9 K/UL (ref 3.5–11.3)

## 2024-04-08 PROCEDURE — P9603 ONE-WAY ALLOW PRORATED MILES: HCPCS

## 2024-04-08 PROCEDURE — 80053 COMPREHEN METABOLIC PANEL: CPT

## 2024-04-08 PROCEDURE — 85025 COMPLETE CBC W/AUTO DIFF WBC: CPT

## 2024-04-08 PROCEDURE — 83735 ASSAY OF MAGNESIUM: CPT

## 2024-04-08 PROCEDURE — 84100 ASSAY OF PHOSPHORUS: CPT

## 2024-04-08 PROCEDURE — 36415 COLL VENOUS BLD VENIPUNCTURE: CPT

## 2024-04-09 ENCOUNTER — HOSPITAL ENCOUNTER (OUTPATIENT)
Age: 84
Setting detail: SPECIMEN
Discharge: HOME | End: 2024-04-09

## 2024-04-09 LAB
ALBUMIN SERPL-MCNC: 3.2 G/DL (ref 3.5–5.2)
ALP SERPL-CCNC: 88 U/L (ref 40–129)
ALT SERPL-CCNC: 18 U/L (ref 5–41)
ANION GAP SERPL CALCULATED.3IONS-SCNC: 11 MMOL/L (ref 9–17)
AST SERPL-CCNC: 15 U/L
BASOPHILS # BLD: 0.07 K/UL (ref 0–0.2)
BASOPHILS NFR BLD: 1 % (ref 0–2)
BILIRUB SERPL-MCNC: 0.6 MG/DL (ref 0.3–1.2)
BUN SERPL-MCNC: 22 MG/DL (ref 8–23)
BUN/CREAT SERPL: 24 (ref 9–20)
CALCIUM SERPL-MCNC: 8.9 MG/DL (ref 8.6–10.4)
CHLORIDE SERPL-SCNC: 102 MMOL/L (ref 98–107)
CO2 SERPL-SCNC: 24 MMOL/L (ref 20–31)
CREAT SERPL-MCNC: 0.9 MG/DL (ref 0.7–1.2)
EOSINOPHIL # BLD: 0.56 K/UL (ref 0–0.44)
EOSINOPHILS RELATIVE PERCENT: 7 % (ref 1–4)
ERYTHROCYTE [DISTWIDTH] IN BLOOD BY AUTOMATED COUNT: 15.4 % (ref 11.8–14.4)
GFR SERPL CREATININE-BSD FRML MDRD: 85 ML/MIN/1.73M2
GLUCOSE SERPL-MCNC: 112 MG/DL (ref 70–99)
HCT VFR BLD AUTO: 36.3 % (ref 40.7–50.3)
HGB BLD-MCNC: 11.6 G/DL (ref 13–17)
IMM GRANULOCYTES # BLD AUTO: 0.04 K/UL (ref 0–0.3)
IMM GRANULOCYTES NFR BLD: 1 %
LYMPHOCYTES NFR BLD: 1.75 K/UL (ref 1.1–3.7)
LYMPHOCYTES RELATIVE PERCENT: 22 % (ref 24–43)
MCH RBC QN AUTO: 27.5 PG (ref 25.2–33.5)
MCHC RBC AUTO-ENTMCNC: 32 G/DL (ref 28.4–34.8)
MCV RBC AUTO: 86 FL (ref 82.6–102.9)
MONOCYTES NFR BLD: 0.9 K/UL (ref 0.1–1.2)
MONOCYTES NFR BLD: 11 % (ref 3–12)
NEUTROPHILS NFR BLD: 58 % (ref 36–65)
NEUTS SEG NFR BLD: 4.77 K/UL (ref 1.5–8.1)
NRBC BLD-RTO: 0 PER 100 WBC
PLATELET # BLD AUTO: 239 K/UL (ref 138–453)
PMV BLD AUTO: 10 FL (ref 8.1–13.5)
POTASSIUM SERPL-SCNC: 3.8 MMOL/L (ref 3.7–5.3)
PROT SERPL-MCNC: 6.1 G/DL (ref 6.4–8.3)
RBC # BLD AUTO: 4.22 M/UL (ref 4.21–5.77)
RBC # BLD: ABNORMAL 10*6/UL
SODIUM SERPL-SCNC: 137 MMOL/L (ref 135–144)
WBC OTHER # BLD: 8.1 K/UL (ref 3.5–11.3)

## 2024-04-09 PROCEDURE — 80053 COMPREHEN METABOLIC PANEL: CPT

## 2024-04-09 PROCEDURE — P9603 ONE-WAY ALLOW PRORATED MILES: HCPCS

## 2024-04-09 PROCEDURE — 36415 COLL VENOUS BLD VENIPUNCTURE: CPT

## 2024-04-09 PROCEDURE — 85025 COMPLETE CBC W/AUTO DIFF WBC: CPT

## 2024-04-12 ENCOUNTER — HOSPITAL ENCOUNTER (OUTPATIENT)
Age: 84
Setting detail: SPECIMEN
Discharge: HOME | End: 2024-04-12

## 2024-04-12 LAB
ALBUMIN SERPL-MCNC: 3.1 G/DL (ref 3.5–5.2)
ALP SERPL-CCNC: 84 U/L (ref 40–129)
ALT SERPL-CCNC: 21 U/L (ref 5–41)
ANION GAP SERPL CALCULATED.3IONS-SCNC: 14 MMOL/L (ref 9–17)
AST SERPL-CCNC: 29 U/L
BASOPHILS # BLD: 0.07 K/UL (ref 0–0.2)
BASOPHILS NFR BLD: 1 % (ref 0–2)
BILIRUB SERPL-MCNC: 0.4 MG/DL (ref 0.3–1.2)
BUN SERPL-MCNC: 32 MG/DL (ref 8–23)
BUN/CREAT SERPL: 27 (ref 9–20)
CALCIUM SERPL-MCNC: 9.2 MG/DL (ref 8.6–10.4)
CHLORIDE SERPL-SCNC: 101 MMOL/L (ref 98–107)
CO2 SERPL-SCNC: 22 MMOL/L (ref 20–31)
CREAT SERPL-MCNC: 1.2 MG/DL (ref 0.7–1.2)
EOSINOPHIL # BLD: 0.52 K/UL (ref 0–0.44)
EOSINOPHILS RELATIVE PERCENT: 10 % (ref 1–4)
ERYTHROCYTE [DISTWIDTH] IN BLOOD BY AUTOMATED COUNT: 15.2 % (ref 11.8–14.4)
GFR SERPL CREATININE-BSD FRML MDRD: 60 ML/MIN/1.73M2
GLUCOSE SERPL-MCNC: 69 MG/DL (ref 70–99)
HCT VFR BLD AUTO: 36.7 % (ref 40.7–50.3)
HGB BLD-MCNC: 11.5 G/DL (ref 13–17)
IMM GRANULOCYTES # BLD AUTO: 0.03 K/UL (ref 0–0.3)
IMM GRANULOCYTES NFR BLD: 1 %
LYMPHOCYTES NFR BLD: 1.62 K/UL (ref 1.1–3.7)
LYMPHOCYTES RELATIVE PERCENT: 30 % (ref 24–43)
MCH RBC QN AUTO: 27.2 PG (ref 25.2–33.5)
MCHC RBC AUTO-ENTMCNC: 31.3 G/DL (ref 28.4–34.8)
MCV RBC AUTO: 86.8 FL (ref 82.6–102.9)
MONOCYTES NFR BLD: 0.59 K/UL (ref 0.1–1.2)
MONOCYTES NFR BLD: 11 % (ref 3–12)
NEUTROPHILS NFR BLD: 47 % (ref 36–65)
NEUTS SEG NFR BLD: 2.57 K/UL (ref 1.5–8.1)
NRBC BLD-RTO: 0 PER 100 WBC
PLATELET # BLD AUTO: 249 K/UL (ref 138–453)
PMV BLD AUTO: 9.7 FL (ref 8.1–13.5)
POTASSIUM SERPL-SCNC: 4.2 MMOL/L (ref 3.7–5.3)
PROT SERPL-MCNC: 6 G/DL (ref 6.4–8.3)
RBC # BLD AUTO: 4.23 M/UL (ref 4.21–5.77)
RBC # BLD: ABNORMAL 10*6/UL
SODIUM SERPL-SCNC: 137 MMOL/L (ref 135–144)
WBC OTHER # BLD: 5.4 K/UL (ref 3.5–11.3)

## 2024-04-12 PROCEDURE — 36415 COLL VENOUS BLD VENIPUNCTURE: CPT

## 2024-04-12 PROCEDURE — P9603 ONE-WAY ALLOW PRORATED MILES: HCPCS

## 2024-04-12 PROCEDURE — 85025 COMPLETE CBC W/AUTO DIFF WBC: CPT

## 2024-04-12 PROCEDURE — 80053 COMPREHEN METABOLIC PANEL: CPT

## 2024-04-16 ENCOUNTER — HOSPITAL ENCOUNTER (OUTPATIENT)
Age: 84
Setting detail: SPECIMEN
Discharge: HOME | End: 2024-04-16

## 2024-04-16 LAB
ALBUMIN SERPL-MCNC: 3.5 G/DL (ref 3.5–5.2)
ALP SERPL-CCNC: 69 U/L (ref 40–129)
ALT SERPL-CCNC: 23 U/L (ref 5–41)
ANION GAP SERPL CALCULATED.3IONS-SCNC: 9 MMOL/L (ref 9–17)
AST SERPL-CCNC: 27 U/L
BASOPHILS # BLD: 0.08 K/UL (ref 0–0.2)
BASOPHILS NFR BLD: 1 % (ref 0–2)
BILIRUB SERPL-MCNC: 0.3 MG/DL (ref 0.3–1.2)
BUN SERPL-MCNC: 48 MG/DL (ref 8–23)
BUN/CREAT SERPL: 40 (ref 9–20)
CALCIUM SERPL-MCNC: 9.2 MG/DL (ref 8.6–10.4)
CHLORIDE SERPL-SCNC: 102 MMOL/L (ref 98–107)
CO2 SERPL-SCNC: 25 MMOL/L (ref 20–31)
CREAT SERPL-MCNC: 1.2 MG/DL (ref 0.7–1.2)
EOSINOPHIL # BLD: 0.56 K/UL (ref 0–0.44)
EOSINOPHILS RELATIVE PERCENT: 9 % (ref 1–4)
ERYTHROCYTE [DISTWIDTH] IN BLOOD BY AUTOMATED COUNT: 15.2 % (ref 11.8–14.4)
GFR SERPL CREATININE-BSD FRML MDRD: 60 ML/MIN/1.73M2
GLUCOSE SERPL-MCNC: 85 MG/DL (ref 70–99)
HCT VFR BLD AUTO: 35 % (ref 40.7–50.3)
HGB BLD-MCNC: 11 G/DL (ref 13–17)
IMM GRANULOCYTES # BLD AUTO: 0.05 K/UL (ref 0–0.3)
IMM GRANULOCYTES NFR BLD: 1 %
LYMPHOCYTES NFR BLD: 2.11 K/UL (ref 1.1–3.7)
LYMPHOCYTES RELATIVE PERCENT: 32 % (ref 24–43)
MCH RBC QN AUTO: 27.2 PG (ref 25.2–33.5)
MCHC RBC AUTO-ENTMCNC: 31.4 G/DL (ref 28.4–34.8)
MCV RBC AUTO: 86.4 FL (ref 82.6–102.9)
MONOCYTES NFR BLD: 0.64 K/UL (ref 0.1–1.2)
MONOCYTES NFR BLD: 10 % (ref 3–12)
NEUTROPHILS NFR BLD: 47 % (ref 36–65)
NEUTS SEG NFR BLD: 3.12 K/UL (ref 1.5–8.1)
NRBC BLD-RTO: 0 PER 100 WBC
PLATELET # BLD AUTO: 239 K/UL (ref 138–453)
PMV BLD AUTO: 9.9 FL (ref 8.1–13.5)
POTASSIUM SERPL-SCNC: 4.2 MMOL/L (ref 3.7–5.3)
PROT SERPL-MCNC: 6 G/DL (ref 6.4–8.3)
RBC # BLD AUTO: 4.05 M/UL (ref 4.21–5.77)
RBC # BLD: ABNORMAL 10*6/UL
SODIUM SERPL-SCNC: 136 MMOL/L (ref 135–144)
WBC OTHER # BLD: 6.6 K/UL (ref 3.5–11.3)

## 2024-04-16 PROCEDURE — 36415 COLL VENOUS BLD VENIPUNCTURE: CPT

## 2024-04-16 PROCEDURE — 80053 COMPREHEN METABOLIC PANEL: CPT

## 2024-04-16 PROCEDURE — P9603 ONE-WAY ALLOW PRORATED MILES: HCPCS

## 2024-04-16 PROCEDURE — 85025 COMPLETE CBC W/AUTO DIFF WBC: CPT

## 2024-04-19 ENCOUNTER — HOSPITAL ENCOUNTER (OUTPATIENT)
Age: 84
Setting detail: SPECIMEN
Discharge: HOME | End: 2024-04-19

## 2024-04-19 LAB
ALBUMIN SERPL-MCNC: 3.5 G/DL (ref 3.5–5.2)
ALP SERPL-CCNC: 66 U/L (ref 40–129)
ALT SERPL-CCNC: 24 U/L (ref 5–41)
ANION GAP SERPL CALCULATED.3IONS-SCNC: 9 MMOL/L (ref 9–17)
AST SERPL-CCNC: 27 U/L
BASOPHILS # BLD: 0.07 K/UL (ref 0–0.2)
BASOPHILS NFR BLD: 1 % (ref 0–2)
BILIRUB SERPL-MCNC: 0.3 MG/DL (ref 0.3–1.2)
BUN SERPL-MCNC: 50 MG/DL (ref 8–23)
BUN/CREAT SERPL: 45 (ref 9–20)
CALCIUM SERPL-MCNC: 9.3 MG/DL (ref 8.6–10.4)
CHLORIDE SERPL-SCNC: 102 MMOL/L (ref 98–107)
CO2 SERPL-SCNC: 25 MMOL/L (ref 20–31)
CREAT SERPL-MCNC: 1.1 MG/DL (ref 0.7–1.2)
EOSINOPHIL # BLD: 0.43 K/UL (ref 0–0.44)
EOSINOPHILS RELATIVE PERCENT: 8 % (ref 1–4)
ERYTHROCYTE [DISTWIDTH] IN BLOOD BY AUTOMATED COUNT: 15.4 % (ref 11.8–14.4)
GFR SERPL CREATININE-BSD FRML MDRD: 67 ML/MIN/1.73M2
GLUCOSE SERPL-MCNC: 102 MG/DL (ref 70–99)
HCT VFR BLD AUTO: 36.2 % (ref 40.7–50.3)
HGB BLD-MCNC: 11.4 G/DL (ref 13–17)
IMM GRANULOCYTES # BLD AUTO: 0.03 K/UL (ref 0–0.3)
IMM GRANULOCYTES NFR BLD: 1 %
LYMPHOCYTES NFR BLD: 2.1 K/UL (ref 1.1–3.7)
LYMPHOCYTES RELATIVE PERCENT: 37 % (ref 24–43)
MCH RBC QN AUTO: 27.5 PG (ref 25.2–33.5)
MCHC RBC AUTO-ENTMCNC: 31.5 G/DL (ref 28.4–34.8)
MCV RBC AUTO: 87.2 FL (ref 82.6–102.9)
MONOCYTES NFR BLD: 0.49 K/UL (ref 0.1–1.2)
MONOCYTES NFR BLD: 9 % (ref 3–12)
NEUTROPHILS NFR BLD: 44 % (ref 36–65)
NEUTS SEG NFR BLD: 2.57 K/UL (ref 1.5–8.1)
NRBC BLD-RTO: 0 PER 100 WBC
PLATELET # BLD AUTO: 218 K/UL (ref 138–453)
PMV BLD AUTO: 9.8 FL (ref 8.1–13.5)
POTASSIUM SERPL-SCNC: 4.1 MMOL/L (ref 3.7–5.3)
PROT SERPL-MCNC: 5.9 G/DL (ref 6.4–8.3)
RBC # BLD AUTO: 4.15 M/UL (ref 4.21–5.77)
RBC # BLD: ABNORMAL 10*6/UL
SODIUM SERPL-SCNC: 136 MMOL/L (ref 135–144)
WBC OTHER # BLD: 5.7 K/UL (ref 3.5–11.3)

## 2024-04-19 PROCEDURE — 36415 COLL VENOUS BLD VENIPUNCTURE: CPT

## 2024-04-19 PROCEDURE — 80053 COMPREHEN METABOLIC PANEL: CPT

## 2024-04-19 PROCEDURE — 85025 COMPLETE CBC W/AUTO DIFF WBC: CPT

## 2024-04-19 PROCEDURE — P9603 ONE-WAY ALLOW PRORATED MILES: HCPCS

## 2024-04-23 ENCOUNTER — HOSPITAL ENCOUNTER (OUTPATIENT)
Age: 84
Setting detail: SPECIMEN
Discharge: HOME | End: 2024-04-23
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
## 2024-05-19 ENCOUNTER — APPOINTMENT (OUTPATIENT)
Dept: GENERAL RADIOLOGY | Age: 84
DRG: 564 | End: 2024-05-19
Payer: MEDICARE

## 2024-05-19 ENCOUNTER — HOSPITAL ENCOUNTER (INPATIENT)
Age: 84
LOS: 2 days | Discharge: HOME HEALTH | DRG: 564 | End: 2024-05-22
Attending: STUDENT IN AN ORGANIZED HEALTH CARE EDUCATION/TRAINING PROGRAM | Admitting: FAMILY MEDICINE
Payer: MEDICARE

## 2024-05-19 ENCOUNTER — APPOINTMENT (OUTPATIENT)
Dept: CT IMAGING | Age: 84
DRG: 564 | End: 2024-05-19
Payer: MEDICARE

## 2024-05-19 DIAGNOSIS — R53.1 GENERALIZED WEAKNESS: Primary | ICD-10-CM

## 2024-05-19 DIAGNOSIS — T79.6XXA TRAUMATIC RHABDOMYOLYSIS, INITIAL ENCOUNTER (HCC): ICD-10-CM

## 2024-05-19 DIAGNOSIS — J18.9 PNEUMONIA OF RIGHT LUNG DUE TO INFECTIOUS ORGANISM, UNSPECIFIED PART OF LUNG: ICD-10-CM

## 2024-05-19 LAB
ALBUMIN SERPL-MCNC: 3.9 G/DL (ref 3.5–5.2)
ALP SERPL-CCNC: 50 U/L (ref 40–129)
ALT SERPL-CCNC: 19 U/L (ref 5–41)
ANION GAP SERPL CALCULATED.3IONS-SCNC: 14 MMOL/L (ref 9–17)
AST SERPL-CCNC: 26 U/L
BASOPHILS # BLD: 0.05 K/UL (ref 0–0.2)
BASOPHILS NFR BLD: 0 % (ref 0–2)
BILIRUB SERPL-MCNC: 0.9 MG/DL (ref 0.3–1.2)
BUN SERPL-MCNC: 17 MG/DL (ref 8–23)
BUN/CREAT SERPL: 17 (ref 9–20)
CALCIUM SERPL-MCNC: 9.3 MG/DL (ref 8.6–10.4)
CHLORIDE SERPL-SCNC: 98 MMOL/L (ref 98–107)
CK SERPL-CCNC: 562 U/L (ref 39–308)
CO2 SERPL-SCNC: 23 MMOL/L (ref 20–31)
CREAT SERPL-MCNC: 1 MG/DL (ref 0.7–1.2)
EOSINOPHIL # BLD: <0.03 K/UL (ref 0–0.44)
EOSINOPHILS RELATIVE PERCENT: 0 % (ref 1–4)
ERYTHROCYTE [DISTWIDTH] IN BLOOD BY AUTOMATED COUNT: 14.7 % (ref 11.8–14.4)
GFR, ESTIMATED: 75 ML/MIN/1.73M2
GLUCOSE SERPL-MCNC: 154 MG/DL (ref 70–99)
HCT VFR BLD AUTO: 43.5 % (ref 40.7–50.3)
HGB BLD-MCNC: 13.9 G/DL (ref 13–17)
IMM GRANULOCYTES # BLD AUTO: 0.09 K/UL (ref 0–0.3)
IMM GRANULOCYTES NFR BLD: 1 %
INR PPP: 1.1
LACTATE BLDV-SCNC: 1.8 MMOL/L (ref 0.5–1.9)
LYMPHOCYTES NFR BLD: 0.82 K/UL (ref 1.1–3.7)
LYMPHOCYTES RELATIVE PERCENT: 5 % (ref 24–43)
MCH RBC QN AUTO: 27.6 PG (ref 25.2–33.5)
MCHC RBC AUTO-ENTMCNC: 32 G/DL (ref 28.4–34.8)
MCV RBC AUTO: 86.5 FL (ref 82.6–102.9)
MONOCYTES NFR BLD: 0.91 K/UL (ref 0.1–1.2)
MONOCYTES NFR BLD: 6 % (ref 3–12)
MYOGLOBIN SERPL-MCNC: 1345 NG/ML (ref 28–72)
NEUTROPHILS NFR BLD: 88 % (ref 36–65)
NEUTS SEG NFR BLD: 13.73 K/UL (ref 1.5–8.1)
NRBC BLD-RTO: 0 PER 100 WBC
PARTIAL THROMBOPLASTIN TIME: 31.5 SEC (ref 23.9–33.8)
PLATELET # BLD AUTO: 239 K/UL (ref 138–453)
PMV BLD AUTO: 9.5 FL (ref 8.1–13.5)
POTASSIUM SERPL-SCNC: 3.9 MMOL/L (ref 3.7–5.3)
PROT SERPL-MCNC: 6.9 G/DL (ref 6.4–8.3)
PROTHROMBIN TIME: 14 SEC (ref 11.5–14.2)
RBC # BLD AUTO: 5.03 M/UL (ref 4.21–5.77)
RBC # BLD: ABNORMAL 10*6/UL
SODIUM SERPL-SCNC: 135 MMOL/L (ref 135–144)
WBC OTHER # BLD: 15.6 K/UL (ref 3.5–11.3)

## 2024-05-19 PROCEDURE — 82550 ASSAY OF CK (CPK): CPT

## 2024-05-19 PROCEDURE — 80053 COMPREHEN METABOLIC PANEL: CPT

## 2024-05-19 PROCEDURE — 87040 BLOOD CULTURE FOR BACTERIA: CPT

## 2024-05-19 PROCEDURE — 85025 COMPLETE CBC W/AUTO DIFF WBC: CPT

## 2024-05-19 PROCEDURE — 85730 THROMBOPLASTIN TIME PARTIAL: CPT

## 2024-05-19 PROCEDURE — 87636 SARSCOV2 & INF A&B AMP PRB: CPT

## 2024-05-19 PROCEDURE — 99285 EMERGENCY DEPT VISIT HI MDM: CPT

## 2024-05-19 PROCEDURE — 2580000003 HC RX 258: Performed by: STUDENT IN AN ORGANIZED HEALTH CARE EDUCATION/TRAINING PROGRAM

## 2024-05-19 PROCEDURE — 85610 PROTHROMBIN TIME: CPT

## 2024-05-19 PROCEDURE — 84145 PROCALCITONIN (PCT): CPT

## 2024-05-19 PROCEDURE — 70450 CT HEAD/BRAIN W/O DYE: CPT

## 2024-05-19 PROCEDURE — 71045 X-RAY EXAM CHEST 1 VIEW: CPT

## 2024-05-19 PROCEDURE — 83605 ASSAY OF LACTIC ACID: CPT

## 2024-05-19 PROCEDURE — 83874 ASSAY OF MYOGLOBIN: CPT

## 2024-05-19 RX ORDER — 0.9 % SODIUM CHLORIDE 0.9 %
1000 INTRAVENOUS SOLUTION INTRAVENOUS ONCE
Status: COMPLETED | OUTPATIENT
Start: 2024-05-19 | End: 2024-05-20

## 2024-05-19 RX ADMIN — SODIUM CHLORIDE 1000 ML: 9 INJECTION, SOLUTION INTRAVENOUS at 23:32

## 2024-05-20 PROBLEM — M62.82 RHABDOMYOLYSIS: Status: ACTIVE | Noted: 2024-05-20

## 2024-05-20 LAB
ALBUMIN SERPL-MCNC: 3.7 G/DL (ref 3.5–5.2)
ALP SERPL-CCNC: 47 U/L (ref 40–129)
ALT SERPL-CCNC: 22 U/L (ref 5–41)
ANION GAP SERPL CALCULATED.3IONS-SCNC: 11 MMOL/L (ref 9–17)
AST SERPL-CCNC: 53 U/L
BACTERIA URNS QL MICRO: ABNORMAL
BILIRUB SERPL-MCNC: 0.8 MG/DL (ref 0.3–1.2)
BILIRUB UR QL STRIP: NEGATIVE
BUN SERPL-MCNC: 17 MG/DL (ref 8–23)
BUN/CREAT SERPL: 19 (ref 9–20)
CALCIUM SERPL-MCNC: 8.8 MG/DL (ref 8.6–10.4)
CHLORIDE SERPL-SCNC: 101 MMOL/L (ref 98–107)
CK SERPL-CCNC: 1730 U/L (ref 39–308)
CLARITY UR: CLEAR
CO2 SERPL-SCNC: 24 MMOL/L (ref 20–31)
COLOR UR: YELLOW
CREAT SERPL-MCNC: 0.9 MG/DL (ref 0.7–1.2)
EPI CELLS #/AREA URNS HPF: ABNORMAL /HPF (ref 0–5)
ERYTHROCYTE [DISTWIDTH] IN BLOOD BY AUTOMATED COUNT: 14.8 % (ref 11.8–14.4)
FLUAV RNA RESP QL NAA+PROBE: NOT DETECTED
FLUBV RNA RESP QL NAA+PROBE: NOT DETECTED
GFR, ESTIMATED: 85 ML/MIN/1.73M2
GLUCOSE BLD-MCNC: 138 MG/DL (ref 75–110)
GLUCOSE BLD-MCNC: 152 MG/DL (ref 75–110)
GLUCOSE BLD-MCNC: 155 MG/DL (ref 75–110)
GLUCOSE BLD-MCNC: 222 MG/DL (ref 75–110)
GLUCOSE SERPL-MCNC: 133 MG/DL (ref 70–99)
GLUCOSE UR STRIP-MCNC: NEGATIVE MG/DL
HCT VFR BLD AUTO: 42.3 % (ref 40.7–50.3)
HGB BLD-MCNC: 13.5 G/DL (ref 13–17)
HGB UR QL STRIP.AUTO: ABNORMAL
KETONES UR STRIP-MCNC: ABNORMAL MG/DL
LEUKOCYTE ESTERASE UR QL STRIP: NEGATIVE
MCH RBC QN AUTO: 27.5 PG (ref 25.2–33.5)
MCHC RBC AUTO-ENTMCNC: 31.9 G/DL (ref 28.4–34.8)
MCV RBC AUTO: 86.2 FL (ref 82.6–102.9)
MYOGLOBIN SERPL-MCNC: 353 NG/ML (ref 28–72)
NITRITE UR QL STRIP: NEGATIVE
NRBC BLD-RTO: 0 PER 100 WBC
PH UR STRIP: 6 [PH] (ref 5–8)
PLATELET # BLD AUTO: 211 K/UL (ref 138–453)
PMV BLD AUTO: 9.6 FL (ref 8.1–13.5)
POTASSIUM SERPL-SCNC: 3.7 MMOL/L (ref 3.7–5.3)
PROCALCITONIN SERPL-MCNC: 0.4 NG/ML (ref 0–0.09)
PROT SERPL-MCNC: 6.5 G/DL (ref 6.4–8.3)
PROT UR STRIP-MCNC: ABNORMAL MG/DL
RBC # BLD AUTO: 4.91 M/UL (ref 4.21–5.77)
RBC #/AREA URNS HPF: ABNORMAL /HPF (ref 0–2)
SARS-COV-2 RNA RESP QL NAA+PROBE: NOT DETECTED
SODIUM SERPL-SCNC: 136 MMOL/L (ref 135–144)
SOURCE: NORMAL
SP GR UR STRIP: 1.02 (ref 1–1.03)
SPECIMEN DESCRIPTION: NORMAL
TROPONIN I SERPL HS-MCNC: 57 NG/L (ref 0–22)
UROBILINOGEN UR STRIP-ACNC: NORMAL EU/DL (ref 0–1)
WBC #/AREA URNS HPF: ABNORMAL /HPF (ref 0–5)
WBC OTHER # BLD: 16.9 K/UL (ref 3.5–11.3)

## 2024-05-20 PROCEDURE — 2580000003 HC RX 258: Performed by: STUDENT IN AN ORGANIZED HEALTH CARE EDUCATION/TRAINING PROGRAM

## 2024-05-20 PROCEDURE — 99223 1ST HOSP IP/OBS HIGH 75: CPT | Performed by: INTERNAL MEDICINE

## 2024-05-20 PROCEDURE — 96365 THER/PROPH/DIAG IV INF INIT: CPT

## 2024-05-20 PROCEDURE — 2580000003 HC RX 258: Performed by: NURSE PRACTITIONER

## 2024-05-20 PROCEDURE — 82947 ASSAY GLUCOSE BLOOD QUANT: CPT

## 2024-05-20 PROCEDURE — 6360000002 HC RX W HCPCS: Performed by: INTERNAL MEDICINE

## 2024-05-20 PROCEDURE — 97162 PT EVAL MOD COMPLEX 30 MIN: CPT

## 2024-05-20 PROCEDURE — 87086 URINE CULTURE/COLONY COUNT: CPT

## 2024-05-20 PROCEDURE — 6370000000 HC RX 637 (ALT 250 FOR IP): Performed by: NURSE PRACTITIONER

## 2024-05-20 PROCEDURE — 6370000000 HC RX 637 (ALT 250 FOR IP): Performed by: FAMILY MEDICINE

## 2024-05-20 PROCEDURE — 81001 URINALYSIS AUTO W/SCOPE: CPT

## 2024-05-20 PROCEDURE — 97166 OT EVAL MOD COMPLEX 45 MIN: CPT

## 2024-05-20 PROCEDURE — 36415 COLL VENOUS BLD VENIPUNCTURE: CPT

## 2024-05-20 PROCEDURE — 1200000000 HC SEMI PRIVATE

## 2024-05-20 PROCEDURE — 6360000002 HC RX W HCPCS: Performed by: FAMILY MEDICINE

## 2024-05-20 PROCEDURE — 6360000002 HC RX W HCPCS: Performed by: NURSE PRACTITIONER

## 2024-05-20 PROCEDURE — 6360000002 HC RX W HCPCS: Performed by: STUDENT IN AN ORGANIZED HEALTH CARE EDUCATION/TRAINING PROGRAM

## 2024-05-20 PROCEDURE — 80053 COMPREHEN METABOLIC PANEL: CPT

## 2024-05-20 PROCEDURE — 85027 COMPLETE CBC AUTOMATED: CPT

## 2024-05-20 PROCEDURE — 2580000003 HC RX 258: Performed by: INTERNAL MEDICINE

## 2024-05-20 PROCEDURE — 84484 ASSAY OF TROPONIN QUANT: CPT

## 2024-05-20 PROCEDURE — 6370000000 HC RX 637 (ALT 250 FOR IP): Performed by: STUDENT IN AN ORGANIZED HEALTH CARE EDUCATION/TRAINING PROGRAM

## 2024-05-20 PROCEDURE — 82550 ASSAY OF CK (CPK): CPT

## 2024-05-20 PROCEDURE — 83874 ASSAY OF MYOGLOBIN: CPT

## 2024-05-20 PROCEDURE — 97535 SELF CARE MNGMENT TRAINING: CPT

## 2024-05-20 PROCEDURE — 97530 THERAPEUTIC ACTIVITIES: CPT

## 2024-05-20 RX ORDER — SODIUM CHLORIDE 0.9 % (FLUSH) 0.9 %
5-40 SYRINGE (ML) INJECTION PRN
Status: DISCONTINUED | OUTPATIENT
Start: 2024-05-20 | End: 2024-05-22 | Stop reason: HOSPADM

## 2024-05-20 RX ORDER — POTASSIUM CHLORIDE 20 MEQ/1
40 TABLET, EXTENDED RELEASE ORAL PRN
Status: DISCONTINUED | OUTPATIENT
Start: 2024-05-20 | End: 2024-05-22 | Stop reason: HOSPADM

## 2024-05-20 RX ORDER — ASPIRIN 81 MG/1
81 TABLET ORAL DAILY
Status: DISCONTINUED | OUTPATIENT
Start: 2024-05-20 | End: 2024-05-22 | Stop reason: HOSPADM

## 2024-05-20 RX ORDER — OMEGA-3-ACID ETHYL ESTERS 1 G/1
1 CAPSULE, LIQUID FILLED ORAL DAILY
Status: DISCONTINUED | OUTPATIENT
Start: 2024-05-21 | End: 2024-05-22 | Stop reason: HOSPADM

## 2024-05-20 RX ORDER — SODIUM CHLORIDE 0.9 % (FLUSH) 0.9 %
10 SYRINGE (ML) INJECTION PRN
Status: DISCONTINUED | OUTPATIENT
Start: 2024-05-20 | End: 2024-05-22 | Stop reason: HOSPADM

## 2024-05-20 RX ORDER — SODIUM CHLORIDE 0.9 % (FLUSH) 0.9 %
10 SYRINGE (ML) INJECTION EVERY 12 HOURS SCHEDULED
Status: DISCONTINUED | OUTPATIENT
Start: 2024-05-20 | End: 2024-05-22 | Stop reason: HOSPADM

## 2024-05-20 RX ORDER — ATORVASTATIN CALCIUM 80 MG/1
80 TABLET, FILM COATED ORAL DAILY
Status: DISCONTINUED | OUTPATIENT
Start: 2024-05-20 | End: 2024-05-22 | Stop reason: HOSPADM

## 2024-05-20 RX ORDER — POTASSIUM CHLORIDE 7.45 MG/ML
10 INJECTION INTRAVENOUS PRN
Status: DISCONTINUED | OUTPATIENT
Start: 2024-05-20 | End: 2024-05-22 | Stop reason: HOSPADM

## 2024-05-20 RX ORDER — DOXAZOSIN MESYLATE 4 MG/1
4 TABLET ORAL NIGHTLY
Status: DISCONTINUED | OUTPATIENT
Start: 2024-05-20 | End: 2024-05-22 | Stop reason: HOSPADM

## 2024-05-20 RX ORDER — ENOXAPARIN SODIUM 100 MG/ML
30 INJECTION SUBCUTANEOUS 2 TIMES DAILY
Status: DISCONTINUED | OUTPATIENT
Start: 2024-05-20 | End: 2024-05-20

## 2024-05-20 RX ORDER — MEMANTINE HYDROCHLORIDE 5 MG/1
5 TABLET ORAL DAILY
Status: DISCONTINUED | OUTPATIENT
Start: 2024-05-20 | End: 2024-05-22 | Stop reason: HOSPADM

## 2024-05-20 RX ORDER — SODIUM CHLORIDE, SODIUM LACTATE, POTASSIUM CHLORIDE, CALCIUM CHLORIDE 600; 310; 30; 20 MG/100ML; MG/100ML; MG/100ML; MG/100ML
INJECTION, SOLUTION INTRAVENOUS CONTINUOUS
Status: ACTIVE | OUTPATIENT
Start: 2024-05-20 | End: 2024-05-21

## 2024-05-20 RX ORDER — ACETAMINOPHEN 650 MG/1
650 SUPPOSITORY RECTAL EVERY 6 HOURS PRN
Status: DISCONTINUED | OUTPATIENT
Start: 2024-05-20 | End: 2024-05-22 | Stop reason: HOSPADM

## 2024-05-20 RX ORDER — SODIUM CHLORIDE 9 MG/ML
INJECTION, SOLUTION INTRAVENOUS PRN
Status: DISCONTINUED | OUTPATIENT
Start: 2024-05-20 | End: 2024-05-22 | Stop reason: HOSPADM

## 2024-05-20 RX ORDER — FENOFIBRATE 160 MG/1
160 TABLET ORAL DAILY
Status: DISCONTINUED | OUTPATIENT
Start: 2024-05-20 | End: 2024-05-22 | Stop reason: HOSPADM

## 2024-05-20 RX ORDER — ACETAMINOPHEN 500 MG
1000 TABLET ORAL ONCE
Status: COMPLETED | OUTPATIENT
Start: 2024-05-20 | End: 2024-05-20

## 2024-05-20 RX ORDER — MAGNESIUM SULFATE IN WATER 40 MG/ML
2000 INJECTION, SOLUTION INTRAVENOUS PRN
Status: DISCONTINUED | OUTPATIENT
Start: 2024-05-20 | End: 2024-05-22 | Stop reason: HOSPADM

## 2024-05-20 RX ORDER — ONDANSETRON 2 MG/ML
4 INJECTION INTRAMUSCULAR; INTRAVENOUS EVERY 6 HOURS PRN
Status: DISCONTINUED | OUTPATIENT
Start: 2024-05-20 | End: 2024-05-22 | Stop reason: HOSPADM

## 2024-05-20 RX ORDER — QUETIAPINE FUMARATE 25 MG/1
25 TABLET, FILM COATED ORAL NIGHTLY
Status: DISCONTINUED | OUTPATIENT
Start: 2024-05-20 | End: 2024-05-22 | Stop reason: HOSPADM

## 2024-05-20 RX ORDER — ACETAMINOPHEN 325 MG/1
650 TABLET ORAL EVERY 6 HOURS PRN
Status: DISCONTINUED | OUTPATIENT
Start: 2024-05-20 | End: 2024-05-22 | Stop reason: HOSPADM

## 2024-05-20 RX ORDER — ACETAMINOPHEN 325 MG/1
650 TABLET ORAL EVERY 4 HOURS PRN
Status: DISCONTINUED | OUTPATIENT
Start: 2024-05-20 | End: 2024-05-20 | Stop reason: SDUPTHER

## 2024-05-20 RX ORDER — SODIUM CHLORIDE 0.9 % (FLUSH) 0.9 %
5-40 SYRINGE (ML) INJECTION EVERY 12 HOURS SCHEDULED
Status: DISCONTINUED | OUTPATIENT
Start: 2024-05-20 | End: 2024-05-22 | Stop reason: HOSPADM

## 2024-05-20 RX ORDER — ONDANSETRON 4 MG/1
4 TABLET, ORALLY DISINTEGRATING ORAL EVERY 8 HOURS PRN
Status: DISCONTINUED | OUTPATIENT
Start: 2024-05-20 | End: 2024-05-22 | Stop reason: HOSPADM

## 2024-05-20 RX ORDER — IPRATROPIUM BROMIDE AND ALBUTEROL SULFATE 2.5; .5 MG/3ML; MG/3ML
1 SOLUTION RESPIRATORY (INHALATION)
Status: DISCONTINUED | OUTPATIENT
Start: 2024-05-20 | End: 2024-05-20

## 2024-05-20 RX ORDER — IPRATROPIUM BROMIDE AND ALBUTEROL SULFATE 2.5; .5 MG/3ML; MG/3ML
1 SOLUTION RESPIRATORY (INHALATION) EVERY 4 HOURS PRN
Status: DISCONTINUED | OUTPATIENT
Start: 2024-05-20 | End: 2024-05-22 | Stop reason: HOSPADM

## 2024-05-20 RX ORDER — LOSARTAN POTASSIUM 100 MG/1
100 TABLET ORAL DAILY
Status: DISCONTINUED | OUTPATIENT
Start: 2024-05-20 | End: 2024-05-22 | Stop reason: HOSPADM

## 2024-05-20 RX ADMIN — METFORMIN HYDROCHLORIDE 1000 MG: 500 TABLET ORAL at 18:34

## 2024-05-20 RX ADMIN — CEFEPIME 2000 MG: 2 INJECTION, POWDER, FOR SOLUTION INTRAVENOUS at 09:45

## 2024-05-20 RX ADMIN — ACETAMINOPHEN 650 MG: 325 TABLET ORAL at 19:49

## 2024-05-20 RX ADMIN — APIXABAN 5 MG: 5 TABLET, FILM COATED ORAL at 19:49

## 2024-05-20 RX ADMIN — SODIUM CHLORIDE, POTASSIUM CHLORIDE, SODIUM LACTATE AND CALCIUM CHLORIDE: 600; 310; 30; 20 INJECTION, SOLUTION INTRAVENOUS at 04:56

## 2024-05-20 RX ADMIN — CEFEPIME 2000 MG: 2 INJECTION, POWDER, FOR SOLUTION INTRAVENOUS at 02:00

## 2024-05-20 RX ADMIN — VANCOMYCIN HYDROCHLORIDE 750 MG: 750 INJECTION, POWDER, LYOPHILIZED, FOR SOLUTION INTRAVENOUS at 19:48

## 2024-05-20 RX ADMIN — SODIUM CHLORIDE, PRESERVATIVE FREE 10 ML: 5 INJECTION INTRAVENOUS at 09:43

## 2024-05-20 RX ADMIN — WATER 1000 MG: 1 INJECTION INTRAMUSCULAR; INTRAVENOUS; SUBCUTANEOUS at 13:49

## 2024-05-20 RX ADMIN — SODIUM CHLORIDE, POTASSIUM CHLORIDE, SODIUM LACTATE AND CALCIUM CHLORIDE: 600; 310; 30; 20 INJECTION, SOLUTION INTRAVENOUS at 15:46

## 2024-05-20 RX ADMIN — MEMANTINE 5 MG: 5 TABLET ORAL at 18:34

## 2024-05-20 RX ADMIN — LOSARTAN POTASSIUM 100 MG: 100 TABLET, FILM COATED ORAL at 21:20

## 2024-05-20 RX ADMIN — ATORVASTATIN CALCIUM 80 MG: 80 TABLET, FILM COATED ORAL at 21:20

## 2024-05-20 RX ADMIN — AZITHROMYCIN MONOHYDRATE 500 MG: 500 INJECTION, POWDER, LYOPHILIZED, FOR SOLUTION INTRAVENOUS at 05:01

## 2024-05-20 RX ADMIN — FENOFIBRATE 160 MG: 160 TABLET ORAL at 21:20

## 2024-05-20 RX ADMIN — SODIUM CHLORIDE, PRESERVATIVE FREE 10 ML: 5 INJECTION INTRAVENOUS at 19:50

## 2024-05-20 RX ADMIN — ENOXAPARIN SODIUM 30 MG: 100 INJECTION SUBCUTANEOUS at 09:42

## 2024-05-20 RX ADMIN — ACETAMINOPHEN 1000 MG: 500 TABLET ORAL at 00:40

## 2024-05-20 RX ADMIN — QUETIAPINE FUMARATE 25 MG: 25 TABLET ORAL at 19:49

## 2024-05-20 RX ADMIN — ASPIRIN 81 MG: 81 TABLET, COATED ORAL at 18:34

## 2024-05-20 RX ADMIN — VANCOMYCIN HYDROCHLORIDE 2500 MG: 5 INJECTION, POWDER, LYOPHILIZED, FOR SOLUTION INTRAVENOUS at 13:59

## 2024-05-20 ASSESSMENT — PAIN SCALES - GENERAL
PAINLEVEL_OUTOF10: 4
PAINLEVEL_OUTOF10: 2

## 2024-05-20 ASSESSMENT — PAIN DESCRIPTION - ONSET: ONSET: ON-GOING

## 2024-05-20 ASSESSMENT — PAIN DESCRIPTION - FREQUENCY: FREQUENCY: CONTINUOUS

## 2024-05-20 ASSESSMENT — PAIN DESCRIPTION - PAIN TYPE: TYPE: CHRONIC PAIN

## 2024-05-20 ASSESSMENT — PAIN DESCRIPTION - ORIENTATION: ORIENTATION: RIGHT;LEFT

## 2024-05-20 ASSESSMENT — PAIN DESCRIPTION - LOCATION: LOCATION: KNEE

## 2024-05-20 NOTE — H&P
Hospitalist - History & Physical      Patient: Keith Oreilly    Unit/Bed:2003/2003-02  YOB: 1940  MRN: [id number]   Acct: [id number]   PCP: Chaparrita Barrios MD    Date of Service: Pt seen/examined on 05/20/24  and Admitted to Inpatient with expected LOS greater than two midnights due to medical therapy.     Chief Complaint:  Fever, AMS    Assessment and Plan:-  Altered mental status  Cellulitis  Atrial fibrillation on anticoagulation  Congestive heart failure/CAD  Diabetes mellitus/hypertension/hyperlipidemia  АННА on CPAP  Elevated troponin  Recent lacunar infarct  Hx of Afib per chart review - unsure if he is still on eliquis  Mild late onset Alzheimer's dementia with agitation  Diabetic polyneuropathy  Tachybradycardia syndrome  Elevated CK, myoglobin, troponin   Traumatic rhabdomyolysis    Patient will be admitted to the medical floor.  Infectious disease has been consulted.  Concern for cellulitis of his right auricle and upper face.  Antibiotics have been adjusted to Rocephin and vancomycin.  PT/OT.  Monitor mental status.  Patient is a very poor historian.  No family at bedside at this time.  Continue IV fluids, CBC/CMP.  Cardiology, nephrology consulted.  Patient's troponin was elevated on admission.  Patient is a poor historian hence we will get cardiac evaluation.  He denies health problems in general.  He tells me that he is here for a fall.  Continue home medications.  I have reviewed his notes from other hospitalizations, PCP.  Patient recently saw his family physician on 5/1/2024.  Medications have been resumed based on that visit.  I am holding his diuretics given that his CK and myoglobin are elevated.    History Of Present Illness:      83-year-old male with past medical history of A-fib, CAD, CHF, diabetes, АННА on CPAP, Alzheimer's dementia, recent CVA, admitted to the hospital for complaints of altered mental status, weakness, fatigue.  Patient is a very poor historian.  There is no  abnormality.      Redemonstration of old lacunar infarction in the head of right caudate   nucleus, and in the anterior limb of the right internal capsule.   Redemonstration of moderate chronic microvascular ischemic changes in the   white matter of both cerebral hemispheres.           CT HEAD WO CONTRAST    Result Date: 5/20/2024  EXAMINATION: CT OF THE HEAD WITHOUT CONTRAST  5/19/2024 10:46 pm TECHNIQUE: CT of the head was performed without the administration of intravenous contrast. Automated exposure control, iterative reconstruction, and/or weight based adjustment of the mA/kV was utilized to reduce the radiation dose to as low as reasonably achievable. COMPARISON: Noncontrast CT scan of head on 3/23/2024.  MRI of brain on 3/23/2024. HISTORY: ORDERING SYSTEM PROVIDED HISTORY: Anticoagulated, altered mental status TECHNOLOGIST PROVIDED HISTORY: Anticoagulated, altered mental status Decision Support Exception - unselect if not a suspected or confirmed emergency medical condition->Emergency Medical Condition (MA) Reason for Exam: Anticoagulated, altered mental status FINDINGS: BRAIN/VENTRICLES: No evidence of intracranial hemorrhage. Redemonstration of moderate chronic microvascular ischemic changes in the white matter of both cerebral hemispheres. Redemonstration of old lacunar infarction in the head of right caudate nucleus and in the anterior limb of the right internal capsule. Mild cortical atrophy.  No significant central atrophy.  No midline shift. No evidence of intracranial mass, subdural or epidural hematoma or subdural hygroma. ORBITS: The visualized portion of the orbits demonstrate no acute abnormality. SINUSES: The visualized paranasal sinuses and mastoid air cells demonstrate no acute abnormality. SOFT TISSUES/SKULL:  No acute abnormality of the visualized skull or soft tissues.     No acute intracranial abnormality. Redemonstration of old lacunar infarction in the head of right caudate nucleus, and

## 2024-05-20 NOTE — PROGRESS NOTES
Pharmacy medication reconciliation services requested via:    [x] Perfect Serve Message sent to: [facility] Med Rec Pharmacist  [] Phone call/message to [phone]  [] Order placed for Pharmacy Consult with 'Med Rec' in the order comments  [] Other     To Assist with Medication Reconciliation:   [] Request made to Family member to bring medications into the hospital  [x] Request made to Family member to call hospital with medication list/ wife not aware  [] Message left with physician office  [] Request for medical records made to   [] Other

## 2024-05-20 NOTE — ED NOTES
Writer talked to patient's wife about patient's POC.     Patient uses a lift chair to get up to get up, patient uses a walker. Patient has been experiencing weakness. Patient had a TIA begging of April. When patient woke up from a nap today he was unable to get up, eventually he was able to get up with help with his son. When he went to bed, he wanted to get up to urinate his wife advised him to stay in bed because he had a brief on. Patient did not understand he had a brief on and wife reported he kept sliding out of the bed from a sitting position. Wife reports that patient has been having difficulties understanding what she has been saying to him, his speech has been slower than normal. Wife reported that \"he has been burning up when I would touch him.\"

## 2024-05-20 NOTE — PROGRESS NOTES
Pt admitted to room 200 in stable condition from ED  Oriented to room and surroundings  Bed in lowest position, wheels locked, 2/4 side rails up  Call light in reach, room free of clutter, adequate lighting provided  Denies any further questions at this time  Instructed to call out with any questions/concerns/new onset of pain and/or n/v   White board updated  Continue to monitor with hourly rounding  STAY WITH ME protocol initiated   Bed alarm on/Fall Risk signs in place/Fall risk sticker to wrist band  Non-skid socks on/at bedside.

## 2024-05-20 NOTE — RT PROTOCOL NOTE
RT Inhaler-Nebulizer Bronchodilator Protocol Note    There is a bronchodilator order in the chart from a provider indicating to follow the RT Bronchodilator Protocol and there is an “Initiate RT Inhaler-Nebulizer Bronchodilator Protocol” order as well (see protocol at bottom of note).    CXR Findings:  XR CHEST PORTABLE    Result Date: 5/20/2024  Right basilar atelectatic changes and/or infiltrates,  not significantly changed as compared to previous chest x-ray on 03/23/2024. Borderline cardiac size with evidence of previous CABG.  No evidence of CHF.       The findings from the last RT Protocol Assessment were as follows:   History Pulmonary Disease: Smoker 15 pack years or more  Respiratory Pattern: Dyspnea on exertion or RR 21-25 bpm  Breath Sounds: Clear breath sounds  Cough: Strong, spontaneous, non-productive  Indication for Bronchodilator Therapy:    Bronchodilator Assessment Score: 3    Aerosolized bronchodilator medication orders have been revised according to the RT Inhaler-Nebulizer Bronchodilator Protocol below.    Respiratory Therapist to perform RT Therapy Protocol Assessment initially then follow the protocol.  Repeat RT Therapy Protocol Assessment PRN for score 0-3 or on second treatment, BID, and PRN for scores above 3.    No Indications - adjust the frequency to every 6 hours PRN wheezing or bronchospasm, if no treatments needed after 48 hours then discontinue using Per Protocol order mode.     If indication present, adjust the RT bronchodilator orders based on the Bronchodilator Assessment Score as indicated below.  Use Inhaler orders unless patient has one or more of the following: on home nebulizer, not able to hold breath for 10 seconds, is not alert and oriented, cannot activate and use MDI correctly, or respiratory rate 25 breaths per minute or more, then use the equivalent nebulizer order(s) with same Frequency and PRN reasons based on the score.  If a patient is on this medication at home  then do not decrease Frequency below that used at home.    0-3 - enter or revise RT bronchodilator order(s) to equivalent RT Bronchodilator order with Frequency of every 4 hours PRN for wheezing or increased work of breathing using Per Protocol order mode.        4-6 - enter or revise RT Bronchodilator order(s) to two equivalent RT bronchodilator orders with one order with BID Frequency and one order with Frequency of every 4 hours PRN wheezing or increased work of breathing using Per Protocol order mode.        7-10 - enter or revise RT Bronchodilator order(s) to two equivalent RT bronchodilator orders with one order with TID Frequency and one order with Frequency of every 4 hours PRN wheezing or increased work of breathing using Per Protocol order mode.       11-13 - enter or revise RT Bronchodilator order(s) to one equivalent RT bronchodilator order with QID Frequency and an Albuterol order with Frequency of every 4 hours PRN wheezing or increased work of breathing using Per Protocol order mode.      Greater than 13 - enter or revise RT Bronchodilator order(s) to one equivalent RT bronchodilator order with every 4 hours Frequency and an Albuterol order with Frequency of every 2 hours PRN wheezing or increased work of breathing using Per Protocol order mode.     RT to enter RT Home Evaluation for COPD & MDI Assessment order using Per Protocol order mode.    Electronically signed by Neha Vegas RCP on 5/20/2024 at 4:30 AM

## 2024-05-20 NOTE — PROGRESS NOTES
Consult Note    Reason for Consult: Acute kidney injury  Rhabdomyolysis  Elevated CPK, myoglobin and troponin  Known history of dementia  Type 2 diabetes  Hypertension  Known history of coronary artery disease    Requesting Physician:  Lea Whitney MD    HISTORY OF PRESENT ILLNESS:    The patient is a 83 y.o. male who presents with known history of congestive heart failure, paroxysmal atrial fibrillation, hypertension, tachybradycardia syndrome, history of dementia, sleep apnea.  Patient had recent hospitalization for CVA and rhabdomyolysis s/p fall.  During this admission he had right facial cellulitis he is currently on antibiotic he had mild acute kidney injury on top of chronic kidney disease stage II renal was asked to evaluate patient is currently receiving IV fluid had elevated CPK patient has good urine output current lab data shows a BUN of 50 creatinine 0.9 electrolytes are normal    Review Of Systems:  Constitutional: No fever, chills, lethargy, weakness or wt loss.  HEENT:  No headache, nasal discharge or sore throat.  Cardiac:  No chest pain, dyspnea, orthopnea or PND.  Chest:              No cough, phlegm or wheezing.  Abdomen:  No abdominal pain, nausea, vomiting or diarrhea.  Neuro:  No gross focal weakness, numbness, abnormal movements or seizure like activity.  Skin:   No rashes or itching.  :   No hematuria, pyuria, dysuria or flank pain.  Extremities:  No swelling or joint pains.  Endocrine: No polyuria, polydypsia, or thyroid problems.  Hematology:    No bleeding disorders, bruising or anemia.  All other ROS is negative.  Unable to obtain because he is intubated and sedated.    Past Medical History:   Diagnosis Date    Arthritis     Atrial fibrillation (HCC)     CAD (coronary artery disease)     no stents    CHF (congestive heart failure) (HCC)     patient denies    Diabetes (HCC)     type 2    Diverticulosis     Hyperlipidemia     Obesity     АННА on CPAP     nightly    Poor historian   stated age  General:  Awake, alert, not in distress. Appears to be stated age.  HEENT: Atraumatic, normocephalic. Anicteric sclera. Pink and moist oral mucosa. No carotid bruit. No JVD.  Chest: Bilateral air entry, clear to auscultation, no wheezing, rhonchi or rales.  Cardiovascular: RRR, S1S2, no murmur, rub or gallop. No lower extremity edema.    Abdomen: Soft, non tender to palpation. Active bowel sounds x 4 quadrants.  Musculoskeletal: Active ROM x 4 extremities. No cyanosis or clubbing.  Integumentary: Pink, warm and dry. Free from rash or lesions. Skin turgor normal.  CNS: Oriented to person, place and time. Speech clear. Face symmetrical. No tremor.     Data:  CBC:   Lab Results   Component Value Date    WBC 16.9 (H) 05/20/2024    HGB 13.5 05/20/2024    HCT 42.3 05/20/2024    MCV 86.2 05/20/2024     05/20/2024     BMP:    Lab Results   Component Value Date     05/20/2024     05/19/2024     04/19/2024    K 3.7 05/20/2024    K 3.9 05/19/2024    K 4.1 04/19/2024     05/20/2024    CL 98 05/19/2024     04/19/2024    CO2 24 05/20/2024    CO2 23 05/19/2024    CO2 25 04/19/2024    BUN 17 05/20/2024    BUN 17 05/19/2024    BUN 50 (H) 04/19/2024    CREATININE 0.9 05/20/2024    CREATININE 1.0 05/19/2024    CREATININE 1.1 04/19/2024    GLUCOSE 133 (H) 05/20/2024    GLUCOSE 154 (H) 05/19/2024    GLUCOSE 102 (H) 04/19/2024     CMP:   Lab Results   Component Value Date/Time     05/20/2024 06:37 AM    K 3.7 05/20/2024 06:37 AM     05/20/2024 06:37 AM    CO2 24 05/20/2024 06:37 AM    BUN 17 05/20/2024 06:37 AM    CREATININE 0.9 05/20/2024 06:37 AM    GLUCOSE 133 05/20/2024 06:37 AM    CALCIUM 8.8 05/20/2024 06:37 AM    BILITOT 0.8 05/20/2024 06:37 AM    ALKPHOS 47 05/20/2024 06:37 AM    AST 53 05/20/2024 06:37 AM    ALT 22 05/20/2024 06:37 AM      Hepatic:   Lab Results   Component Value Date    AST 53 (H) 05/20/2024    AST 26 05/19/2024    AST 27 04/19/2024    ALT 22

## 2024-05-20 NOTE — PROGRESS NOTES
Transitions of Care Pharmacy Service   Medication Review    The patient's list of current home medications has been reviewed.     Source(s) of information: Patient, [facility]    Based on information provided by the above source(s), no changes to the patient's home medication list were necessary.     Please review the ACTION REQUESTED section of this note below for any discrepancies on current hospital orders.      I changed or updated the following medications on the patient's home medication list:  Other Notes Unable to verify Cardura and Cozaar at pt's pharmacy. States they are to be filled at [facility] but we do not see this record. New Rxs as of April 2024       Please feel free to call me with any questions about this encounter. Thank you.    Can Christian RPH   Transitions of Care Pharmacy Service  Phone:  [phone]  Fax: [phone]      Electronically signed by Can Christian RP on 5/20/2024 at 7:30 PM         Medications Prior to Admission: doxazosin (CARDURA) 4 MG tablet, Take 1 tablet by mouth nightly  losartan (COZAAR) 100 MG tablet, Take 1 tablet by mouth daily  memantine (NAMENDA) 5 MG tablet, Take 1 tablet by mouth daily  QUEtiapine (SEROQUEL) 25 MG tablet, Take 1 tablet by mouth nightly  dulaglutide (TRULICITY) 1.5 MG/0.5ML SC injection, Inject 0.5 mLs into the skin once a week On Saturdays  furosemide (LASIX) 20 MG tablet, Take 1 tablet by mouth three times a week On Monday, Wednesday, Friday  mometasone (ELOCON) 0.1 % cream, Apply topically daily as needed (for face)  Omega-3 Fatty Acids (FISH OIL) 1000 MG capsule, Take 1 capsule by mouth daily  Multiple Vitamins-Minerals (THERAPEUTIC MULTIVITAMIN-MINERALS) tablet, Take 1 tablet by mouth daily  apixaban (ELIQUIS) 5 MG TABS tablet, Take 1 tablet by mouth 2 times daily  melatonin 3 MG TABS tablet, Take 1 tablet by mouth nightly as needed  fenofibrate (TRICOR) 145 MG tablet, Take 1 tablet by mouth daily  glimepiride (AMARYL) 4 MG

## 2024-05-20 NOTE — ED NOTES
Pt stood at bed of bed and walk to get back in at the top of bed. Pt walked with 2 assist. Pt back in bed and connected back to cardiac monitor. Pt watching TV at this time and no other complaints.

## 2024-05-20 NOTE — CONSULTS
[facility]   Pharmacy Pharmacokinetic Monitoring Service - Vancomycin     Keith Oreilly is a 83 y.o. male starting on vancomycin therapy for SSTI. Pharmacy consulted by Dr. Yao for monitoring and adjustment.    Target Concentration: Goal AUC/-600 mg*hr/L    Additional Antimicrobials: CTX    Pertinent Laboratory Values:   Wt Readings from Last 1 Encounters:   05/19/24 117.9 kg (260 lb)     Temp Readings from Last 1 Encounters:   05/20/24 98.4 °F (36.9 °C) (Oral)     Estimated Creatinine Clearance: 83 mL/min (based on SCr of 0.9 mg/dL).  Recent Labs     05/19/24  2318 05/20/24  0637   CREATININE 1.0 0.9   BUN 17 17   WBC 15.6* 16.9*     Procalcitonin: 0.4    Pertinent Cultures:    MRSA Nasal Swab: N/A. Non-respiratory infection.    Plan:  Dosing recommendations based on Bayesian software  Start vancomycin 2500 mg IV x1 f/b 750 mg IV q8h  Anticipated AUC of 520 and trough concentration of 18.1 at steady state  Renal labs as indicated   Vancomycin concentration ordered for 5/21 @ 1200   Pharmacy will continue to monitor patient and adjust therapy as indicated    Thank you for the consult,  YOLI PAREDES RPH  5/20/2024 12:00 PM

## 2024-05-20 NOTE — PROGRESS NOTES
Occupational Therapy  Facility/Department: [facility] MED SURG  Occupational Therapy Initial Assessment    Name: Keith Oreilly  : 1940  MRN: [id number]  Date of Service: 2024    RN Joel reports patient is medically stable for therapy treatment this date.    Chart reviewed prior to treatment and patient is agreeable for therapy.  All lines intact and patient positioned comfortably at end of treatment.  All patient needs addressed prior to ending therapy session.      Discharge Recommendations:  Patient would benefit from continued therapy after discharge  Pt currently functioning below baseline.  Recommend daily inpatient skilled therapy at time of discharge to maximize long term outcomes and prevent re-admission. Please refer to AM-PAC score for current level of function.    OT Equipment Recommendations  Equipment Needed: Yes  Mobility Devices: ADL Assistive Devices  ADL Assistive Devices: Emergency Alert System;Long-handled Shoe Horn;Long-handled Sponge;Reacher;Sock-Aid Hard       Patient Diagnosis(es): The primary encounter diagnosis was Generalized weakness. Diagnoses of Traumatic rhabdomyolysis, initial encounter ([facility]) and Pneumonia of right lung due to infectious organism, unspecified part of lung were also pertinent to this visit.  Past Medical History:  has a past medical history of Arthritis, Atrial fibrillation (HCC), CAD (coronary artery disease), CHF (congestive heart failure) ([facility]), Diabetes (HCC), Diverticulosis, Hyperlipidemia, Obesity, АННА on CPAP, Poor historian, and Renal stone.  Past Surgical History:  has a past surgical history that includes Appendectomy; other surgical history; Colonoscopy; Cardiac surgery; Cardiac catheterization (2020); joint replacement; and Bunionectomy (Right, 12/3/2020).     PER H&P: Keith Oreilly is a 83 y.o.-year-old male who was initially admitted on 2024.    Keith is seen and evaluated at bedside he is a very poor historian and the history is  tolerance, generalized weakness, and cognitive deficits.  Skin Care: N/A         Activity Tolerance  Activity Tolerance: Treatment limited secondary to decreased cognition;Patient limited by fatigue      Bed mobility  Supine to Sit: Minimal assistance  Sit to Supine: Minimal assistance  Scooting: Minimal assistance  Bed Mobility Comments: Pt compelted bed mobility with some diffiuclites this date. Pt required increased time/effort to complete. Mod verbal cues given for initiaiton, sequencing and progression. Once seated on EOB, pt denied any dizziness/lightheadedness.      Transfers  Sit to stand: Minimal assistance (with RW)  Stand to sit: Minimal assistance  Transfer Comments: Pt completed STS transfer with mild diffiuclites. Min verbal cues for pacing self, upright posture, controlled stand to sit, squaring self/AD up to surface and reaching back prior to sitting all to increase safety.      Vision  Vision: Impaired (reports he flushed his glasses down the toilet by accident)  Hearing  Hearing: Exceptions to WFL  Hearing Exceptions: Hard of hearing/hearing concerns;No hearing aid        Cognition  Overall Cognitive Status: Exceptions  Arousal/Alertness: Delayed responses to stimuli  Following Commands: Follows one step commands with increased time  Memory: Decreased short term memory;Decreased recall of recent events  Safety Judgement: Decreased awareness of need for safety;Decreased awareness of need for assistance  Problem Solving: Decreased awareness of errors;Assistance required to correct errors made;Assistance required to identify errors made;Assistance required to implement solutions;Assistance required to generate solutions  Insights: Decreased awareness of deficits  Initiation: Requires cues for all  Sequencing: Requires cues for all  Orientation  Overall Orientation Status: Impaired  Orientation Level: Oriented to person;Oriented to place;Disoriented to time;Disoriented to situation (did not know month,

## 2024-05-20 NOTE — CARE COORDINATION
Social work: Spoke to Vishal Webber/dtr on the phone regarding PT/OT recommendation of SNF.   They are agreeable, choices given of [facility] or [facility].  HENS started.   Will need precert.

## 2024-05-20 NOTE — CARE COORDINATION
Case Management Assessment  Initial Evaluation    Date/Time of Evaluation: 5/20/2024 8:50 AM  Assessment Completed by: Kaity Schroeder RN    If patient is discharged prior to next notation, then this note serves as note for discharge by case management.    Patient Name: Keith Oreilly                   YOB: 1940  Diagnosis: Rhabdomyolysis [M62.82]  Generalized weakness [R53.1]  Traumatic rhabdomyolysis, initial encounter ([facility]) [T79.6XXA]  Pneumonia of right lung due to infectious organism, unspecified part of lung [J18.9]                   Date / Time: 5/19/2024 11:12 PM    Patient Admission Status: Inpatient   Readmission Risk (Low < 19, Mod (19-27), High > 27): Readmission Risk Score: 18.1    Current PCP: Chaparrita Barrios MD  PCP verified by ? Yes    Chart Reviewed: Yes      History Provided by: Spouse  Patient Orientation: Person    Patient Cognition: Severely Impaired    Hospitalization in the last 30 days (Readmission):  No    If yes, Readmission Assessment in  Navigator will be completed.    Advance Directives:      Code Status: Full Code   Patient's Primary Decision Maker is: Legal Next of Kin      Discharge Planning:    Patient lives with: Spouse/Significant Other Type of Home: House  Primary Care Giver:    Patient Support Systems include: Family Members, Children   Current Financial resources: Medicare  Current community resources: ECF/Home Care  Current services prior to admission: Home Care            Current DME:              Type of Home Care services:  PT, OT, Nursing Services    ADLS  Prior functional level: Assistance with the following:, Shopping, Housework, Cooking  Current functional level: Other (see comment) (Await PT/OT eval)    PT AM-PAC:   /24  OT AM-PAC:   /24    Family can provide assistance at DC: Yes  Would you like Case Management to discuss the discharge plan with any other family members/significant others, and if so, who?    Plans to Return to Present Housing: Unknown  discharge plan. Freedom of choice list with basic dialogue that supports the patient's individualized plan of care/goals and shares the quality data associated with the providers was provided to: Patient Representative   Patient Representative Name: wife- Karlie Oreilly     The Patient and/or Patient Representative Agree with the Discharge Plan? Yes    Kaity Schroeder RN  Case Management Department  Ph: [phone]                       Post Acute Facility/Agency List     Provided spouse with the following list, the list includes the overall star ratings obtained from CMS per the Medicare Web site (www.Medicare.gov):     [] Long Term Acute Care Facilities  [] Acute Inpatient Rehabilitation Facilities  [x] Skilled Nursing Facilities  [] Home Care    Provided verbal instructions on how to utilize the QR Code to obtain additional detailed star ratings from www.Medicare.gov     offered to print and provide the detailed list:    []Accepted   [x]Declined

## 2024-05-20 NOTE — DISCHARGE INSTR - COC
Continuity of Care Form    Patient Name: Keith Oreilly   :  1940  MRN:  [id number]    Admit date:  2024  Discharge date:  ***    Code Status Order: Full Code   Advance Directives:     Admitting Physician:  Lea Whitney MD  PCP: Chaparrita Barrios MD    Discharging Nurse: ***  Discharging Hospital Unit/Room#:   Discharging Unit Phone Number: ***    Emergency Contact:   Extended Emergency Contact Information  Primary Emergency Contact: DenilsonKarlie Sudarshan  Address: [facility] [place] [address]  Home Phone: [phone]  Mobile Phone: [phone]  Relation: Spouse  Secondary Emergency Contact: Vishal Oreilly  Mobile Phone: [phone]  Relation: Child  Preferred language: English   needed? No    Past Surgical History:  Past Surgical History:   Procedure Laterality Date    APPENDECTOMY      BUNIONECTOMY Right 12/3/2020    RIGHT 5TH METHEAD CONDYLECTOMY performed by Hugh Patel MD at [facility] OR    CARDIAC CATHETERIZATION  2020    : LEFT HEART CATH / LV   CORS    CARDIAC SURGERY      quad bypass    COLONOSCOPY      JOINT REPLACEMENT      patient denies    OTHER SURGICAL HISTORY      tumor removed from rib       Immunization History:   Immunization History   Administered Date(s) Administered    COVID-19, MODERNA BLUE border, Primary or Immunocompromised, (age 12y+), IM, 100 mcg/0.5mL 2021, 07/15/2022       Active Problems:  Patient Active Problem List   Diagnosis Code    Dupuytren's contracture of left hand M72.0    Tailor's bunion of right foot M21.621    CVA (cerebrovascular accident due to intracerebral hemorrhage) ([facility]) I61.9    Encephalopathy G93.40    Cognitive impairment R41.89    Leukocytosis D72.829    Lacunar infarction ([facility]) I63.81    Right arm weakness R29.898    Rhabdomyolysis M62.82       Isolation/Infection:   Isolation            C Diff Contact          Patient Infection Status       Infection Onset Added Last Indicated Last Indicated By

## 2024-05-20 NOTE — PROGRESS NOTES
[facility]  Initial Consult Note  Date: 5/20/2024    Hospital day :0     Impression:   Right auricle and upper facial cellulitis  Altered mental status likely related to above  Diabetes mellitus type 2  Coronary artery disease  Dementia    Recommendations   The patient has no clinical findings to support pneumonia is not short of breath or coughing and the chest x-ray changes have been stable since 2 months ago and I suspect that the patient has atelectasis rather than pneumonia  While the patient was reporting going to the bathroom multiple times the wife was concerned about a UTI but the urinalysis is not consistent with this  I will change antimicrobial therapy to Rocephin and vancomycin to cover for cellulitis  I will monitor his clinical progress and adjust therapy accordingly  Consider neurological evaluation    Chief complaint/reason for consultation:     History of Present Illness:   Keith Oreilly is a 83 y.o.-year-old male who was initially admitted on 5/19/2024.    Keith is seen and evaluated at bedside he is a very poor historian and the history is obtained from his wife Karlie who I called on the phone.    Keith has diabetes mellitus type 2, coronary artery disease, congestive heart failure, paroxysmal atrial fibrillation, hypertension, hyperlipidemia, morbid obesity, tachybradycardia syndrome, arthritis and cognitive impairment/dementia, obstructive sleep apnea on CPAP.     He was recently hospitalized here after an acute cerebral infarction/CVA and rhabdomyolysis related to a fall.  The patient was discharged to acute rehab and had made at home and the wife reports that he was doing well walking with a walker but starting on Saturday she noticed that he was disoriented he was not really listening to her he was trying to get out of the bed to go to the bathroom multiple times.  She also noticed that his right earlobe was red on Saturday and this extended into his face on  Date/Time    COVID19 Not Detected 05/19/2024 11:24 PM    COVID19 Not Detected 03/23/2024 10:30 AM    COVID19 Not Detected 11/28/2020 02:40 PM     No results found for requested labs within last 30 days.     Component  Ref Range & Units 5/20/24 0141 3/23/24 0930 3/23/24 0930   Color, UA  Yellow Yellow  Yellow   Turbidity UA  Clear Clear  SLIGHTLY CLOUDY Abnormal    Glucose, Ur  NEGATIVE mg/dL NEGATIVE  3+ Abnormal    Bilirubin, Urine  NEGATIVE NEGATIVE  Presumptive positive. Unable to confirm due to unavailability of reagent. Abnormal    Ketones, Urine  NEGATIVE mg/dL TRACE Abnormal   1+ Abnormal    Specific Gravity, UA  1.005 - 1.030 1.025  1.020   Urine Hgb  NEGATIVE 3+ Abnormal   3+ Abnormal    pH, Urine  5.0 - 8.0 6.0  5.5   Protein, UA  NEGATIVE mg/dL 3+ Abnormal   2+ Abnormal    Urobilinogen, Urine  0.0 - 1.0 EU/dL Normal  Normal   Nitrite, Urine  NEGATIVE NEGATIVE  NEGATIVE   Leukocyte Esterase, Urine  NEGATIVE NEGATIVE  NEGATIVE   WBC, UA  0 - 5 /HPF None 0 TO 2    RBC, UA  0 - 2 /HPF 5 TO 10 5 TO 10    Epithelial Cells, UA  0 - 5 /HPF None 0 TO 2    Bacteria, UA  None FEW Abnormal      Resulting Agency [facility]              Specimen Collected: 05/20/24 01:41 EDT Last Resulted: 05/20/24 02:06 EDT           Imaging Studies:   CT OF THE HEAD WITHOUT CONTRAST  5/19/2024 10:46 pm   FINDINGS:   No acute intracranial abnormality. Redemonstration of old lacunar infarction in the head of right caudate nucleus, and in the anterior limb of the right internal capsule. Redemonstration of moderate chronic microvascular ischemic changes in the white matter of both cerebral hemispheres.       ONE XRAY VIEW OF THE CHEST.  UPRIGHT AP VIEW OF CHEST. 5/19/2024 11:01 pm   FINDINGS:  Right basilar atelectatic changes and/or infiltrates, not significantly changed as compared to previous chest x-ray on 3/23/2024. Borderline cardiac size with evidence of previous

## 2024-05-20 NOTE — PROGRESS NOTES
Physical Therapy  Facility/Department: [facility] MED SURG  Physical Therapy Initial Assessment    Name: Keith Oreilly  : 1940  MRN: [id number]  Date of Service: 2024  CELENA Maldonado reports patient is medically stable for therapy treatment this date.    Chart reviewed prior to treatment and patient is agreeable for therapy.  All lines intact and patient positioned comfortably at end of treatment.  All patient needs addressed prior to ending therapy session.      Discharge Recommendations:  Patient would benefit from continued therapy after discharge   Pt currently functioning below baseline.  Recommend daily inpatient skilled therapy at time of discharge to maximize long term outcomes and prevent re-admission. Please refer to AM-PAC score for current level of function.    Per H&P: \"83-year-old male with past medical history of A-fib, CAD, CHF, diabetes, АННА on CPAP, Alzheimer's dementia, recent CVA, admitted to the hospital for complaints of altered mental status, weakness, fatigue. Patient is a very poor historian. There is no family present at bedside. Patient tells me that he fell. I suspect there is a evidence of that given that his CK levels are elevated. In the ER, there was concerns for pneumonia although I have low suspicion for that given that his workup is negative. Infectious disease, nephrology, cardiology were consulted.\"      Patient Diagnosis(es): The primary encounter diagnosis was Generalized weakness. Diagnoses of Traumatic rhabdomyolysis, initial encounter (HCC) and Pneumonia of right lung due to infectious organism, unspecified part of lung were also pertinent to this visit.  Past Medical History:  has a past medical history of Arthritis, Atrial fibrillation (HCC), CAD (coronary artery disease), CHF (congestive heart failure) (HCC), Diabetes (HCC), Diverticulosis, Hyperlipidemia, Obesity, АННА on CPAP, Poor historian, and Renal stone.  Past Surgical History:  has a past surgical history that  evaluation.  Vision/Hearing  Vision  Vision: Impaired (reports he flushed his glasses down the toilet by accident)  Hearing  Hearing: Exceptions to WFL  Hearing Exceptions: Hard of hearing/hearing concerns;No hearing aid    Cognition   Orientation  Overall Orientation Status: Impaired  Orientation Level: Oriented to person;Oriented to place;Disoriented to time;Disoriented to situation (did not know month, knew year)  Cognition  Overall Cognitive Status: Exceptions  Arousal/Alertness: Delayed responses to stimuli  Following Commands: Follows one step commands with increased time  Memory: Decreased short term memory;Decreased recall of recent events  Safety Judgement: Decreased awareness of need for safety;Decreased awareness of need for assistance  Problem Solving: Decreased awareness of errors;Assistance required to correct errors made;Assistance required to identify errors made;Assistance required to implement solutions;Assistance required to generate solutions  Insights: Decreased awareness of deficits  Initiation: Requires cues for all  Sequencing: Requires cues for all     Objective   Observation/Palpation  Posture: Fair  Observation: BMI > 36; redness noted on face  Gross Assessment  Sensation: Intact (denies numbness/tingling)     AROM RLE (degrees)  RLE AROM: WFL  AROM LLE (degrees)  LLE AROM : WFL  AROM RUE (degrees)  RUE AROM : WFL  AROM LUE (degrees)  LUE AROM : WFL  Strength RLE  Strength RLE: Exception  Comment: Grossly 3+ to 4-/5  Strength LLE  Strength LLE: WFL  Comment: Grossly 4- to 4/5  Strength RUE  Comment: See OT assessment for detail  Strength LUE  Comment: See OT assessment for detail          Bed mobility  Supine to Sit: Minimal assistance  Sit to Supine: Minimal assistance  Scooting: Minimal assistance  Bed Mobility Comments: Pt w/ mild difficulty throughout bed mobility this date requiring increased time and effort to perform tasks throughout.  Min-mod verbal cueing required for initaition,  progression, and sequencing of BLE & trunk throughout w/ fair return demo.  Pt denying any dizziness/lightheadedness upon sitting EOB.  Assist required for safe line mgmt throughout.    Transfers  Sit to Stand: Minimal Assistance  Stand to Sit: Minimal Assistance  Comment: Pt performed multiple STS transfers throughout session this date demonstrating fair steadiness throughout.  Mod-max verbal cueing required for proper hand placement throughout transfers w/ RW w/ fair return demo.  Pt reporting dizziness/lightheadedness upon standing initially & sitting impulsively w/o warning to staff.  On second transfer, pt denying any dizziness/lightheadedness.  Poor eccentric quad control noted throughout stand>sit transfers despite max cueing.    Ambulation  Surface: Level tile  Device: Rolling Walker  Assistance: Minimal assistance  Quality of Gait: fair steadiness, slow pace, shuffling steps  Gait Deviations: Slow Gretchen;Decreased step length;Decreased step height;Shuffles;Decreased head and trunk rotation;Deviated path  Distance: 20ft x 2  Comments: Pt demonstrating fair steadiness throughout ambulation short distances within room w/ RW.  Pt requiring mod verbal cueing to maintain ALEXANDRE within RW to promote upright posture w/ fair return demo.  Significant shuffling noted despite cueing for heelstrike.  Assist required for safe line mgmt throughout.  More Ambulation?: No  Stairs/Curb  Stairs?: No       Balance  Posture: Fair  Sitting - Static: Good;-  Sitting - Dynamic: Fair;-  Standing - Static: Fair;+  Standing - Dynamic: Fair;-  Single Leg Stance R Le  Single Leg Stance L Le  Comments: Standing balance assessed w/ RW           AM-PAC - Mobility  AM-PAC Basic Mobility - Inpatient   AM-PAC Inpatient Mobility Raw Score : 16  AM-PAC Inpatient T-Scale Score : 40.78  Mobility Inpatient CMS 0-100% Score: 54.16  Mobility Inpatient CMS G-Code Modifier : CK    Functional Outcome Measure-   Single Leg Stance Test:  0 sec.

## 2024-05-20 NOTE — PLAN OF CARE
Problem: Discharge Planning  Goal: Discharge to home or other facility with appropriate resources  Outcome: Progressing     Problem: Safety - Adult  Goal: Free from fall injury  Outcome: Progressing     Problem: ABCDS Injury Assessment  Goal: Absence of physical injury  Outcome: Progressing     Problem: Skin/Tissue Integrity - Adult  Goal: Skin integrity remains intact  5/20/2024 0551 by Lelo Taylor RN  Outcome: Progressing  5/20/2024 0551 by Lelo Taylor RN  Reactivated     Problem: Musculoskeletal - Adult  Goal: Return mobility to safest level of function  5/20/2024 0551 by Lelo Taylor RN  Outcome: Progressing  5/20/2024 0551 by Lelo Taylor RN  Reactivated  Goal: Return ADL status to a safe level of function  5/20/2024 0551 by Lelo Taylor RN  Outcome: Progressing  5/20/2024 0551 by Lelo Taylor RN  Reactivated

## 2024-05-20 NOTE — ED PROVIDER NOTES
[facility] EMERGENCY DEPARTMENT ENCOUNTER      Pt Name: Keith Oreilly  MRN: [id number]  Birthdate 1940  Date of evaluation: 5/19/24    CHIEF COMPLAINT     Fever; AMS    HISTORY OF PRESENT ILLNESS   Keith Oreilly is a 83 y.o. male who presents with generalized weakness, fatigue fever.  History of CVA poor historian.  Type II diabetic as well.  Patient's wife reportedly called ambulance progressively worsening weakness and fatigue most concern for fever.  Patient not have any specific complaints currently.    PASTMEDICAL HISTORY     Past Medical History:   Diagnosis Date    Arthritis     Atrial fibrillation (HCC)     CAD (coronary artery disease)     no stents    CHF (congestive heart failure) (HCC)     patient denies    Diabetes ([facility])     type 2    Diverticulosis     Hyperlipidemia     Obesity     АННА on CPAP     nightly    Poor historian     Renal stone     passed     Past Problem List  Patient Active Problem List   Diagnosis Code    Dupuytren's contracture of left hand M72.0    Tailor's bunion of right foot M21.621    CVA (cerebrovascular accident due to intracerebral hemorrhage) ([facility]) I61.9    Encephalopathy G93.40    Cognitive impairment R41.89    Leukocytosis D72.829    Lacunar infarction ([facility]) I63.81    Right arm weakness R29.898    Rhabdomyolysis M62.82       SURGICAL HISTORY       Past Surgical History:   Procedure Laterality Date    APPENDECTOMY      BUNIONECTOMY Right 12/3/2020    RIGHT 5TH METHEAD CONDYLECTOMY performed by Hugh Patel MD at [facility] OR    CARDIAC CATHETERIZATION  09/02/2020    : LEFT HEART CATH / LV   CORS    CARDIAC SURGERY      quad bypass    COLONOSCOPY      JOINT REPLACEMENT      patient denies    OTHER SURGICAL HISTORY      tumor removed from rib       CURRENT MEDICATIONS       Current Discharge Medication List        CONTINUE these medications which have NOT CHANGED    Details   doxazosin (CARDURA) 4 MG tablet Take 1 tablet by mouth nightly  Qty: 30 tablet, Refills: 3     Indication of Use     Answer:   Prophylaxis-DVT/PE    DISCONTD: acetaminophen (TYLENOL) tablet 650 mg    OR Linked Order Group     ondansetron (ZOFRAN-ODT) disintegrating tablet 4 mg     ondansetron (ZOFRAN) injection 4 mg    ceFEPIme (MAXIPIME) 2,000 mg in sodium chloride 0.9 % 100 mL IVPB (mini-bag)     Order Specific Question:   Antimicrobial Indications     Answer:   Aspiration Pneumonia    azithromycin (ZITHROMAX) 500 mg in 250 mL addavial     Order Specific Question:   Antimicrobial Indications     Answer:   Pneumonia (CAP)     Order Specific Question:   CAP duration of therapy     Answer:   3 days    DISCONTD: ipratropium 0.5 mg-albuterol 2.5 mg (DUONEB) nebulizer solution 1 Dose     Order Specific Question:   Initiate RT Bronchodilator Protocol     Answer:   Yes - Inpatient Protocol    lactated ringers IV soln infusion    sodium chloride flush 0.9 % injection 10 mL    sodium chloride flush 0.9 % injection 10 mL    0.9 % sodium chloride infusion    OR Linked Order Group     potassium chloride (KLOR-CON M) extended release tablet 40 mEq     potassium bicarb-citric acid (EFFER-K) effervescent tablet 40 mEq     potassium chloride 10 mEq/100 mL IVPB (Peripheral Line)    magnesium sulfate 2000 mg in 50 mL IVPB premix    magnesium hydroxide (MILK OF MAGNESIA) 400 MG/5ML suspension 30 mL    OR Linked Order Group     acetaminophen (TYLENOL) tablet 650 mg     acetaminophen (TYLENOL) suppository 650 mg    ipratropium 0.5 mg-albuterol 2.5 mg (DUONEB) nebulizer solution 1 Dose     Order Specific Question:   Initiate RT Bronchodilator Protocol     Answer:   Yes - Inpatient Protocol     DISCHARGE PRESCRIPTIONS:  Current Discharge Medication List        PHYSICIAN CONSULTS ORDERED THIS ENCOUNTER:  IP CONSULT TO HOSPITALIST  IP CONSULT TO NEPHROLOGY  IP CONSULT TO INFECTIOUS DISEASES    ED Course Notes From Epic Narrator:  ED Course as of 05/20/24 0506   Mon May 20, 2024   0039 Total CK(!): 562 [MS]   0039 Myoglobin(!): 1,345  If I can't go to back to [facility], I might as well go home. I have people to help me."

## 2024-05-21 LAB
ALBUMIN SERPL-MCNC: 3 G/DL (ref 3.5–5.2)
ALP SERPL-CCNC: 43 U/L (ref 40–129)
ALT SERPL-CCNC: 26 U/L (ref 5–41)
ANION GAP SERPL CALCULATED.3IONS-SCNC: 7 MMOL/L (ref 9–17)
AST SERPL-CCNC: 47 U/L
BILIRUB SERPL-MCNC: 0.4 MG/DL (ref 0.3–1.2)
BUN SERPL-MCNC: 17 MG/DL (ref 8–23)
BUN/CREAT SERPL: 21 (ref 9–20)
CALCIUM SERPL-MCNC: 8.3 MG/DL (ref 8.6–10.4)
CHLORIDE SERPL-SCNC: 107 MMOL/L (ref 98–107)
CO2 SERPL-SCNC: 26 MMOL/L (ref 20–31)
CREAT SERPL-MCNC: 0.8 MG/DL (ref 0.7–1.2)
ERYTHROCYTE [DISTWIDTH] IN BLOOD BY AUTOMATED COUNT: 14.7 % (ref 11.8–14.4)
GFR, ESTIMATED: 88 ML/MIN/1.73M2
GLUCOSE BLD-MCNC: 132 MG/DL (ref 75–110)
GLUCOSE BLD-MCNC: 154 MG/DL (ref 75–110)
GLUCOSE SERPL-MCNC: 118 MG/DL (ref 70–99)
HCT VFR BLD AUTO: 37 % (ref 40.7–50.3)
HGB BLD-MCNC: 11.5 G/DL (ref 13–17)
MAGNESIUM SERPL-MCNC: 1.6 MG/DL (ref 1.6–2.6)
MCH RBC QN AUTO: 27.4 PG (ref 25.2–33.5)
MCHC RBC AUTO-ENTMCNC: 31.1 G/DL (ref 28.4–34.8)
MCV RBC AUTO: 88.3 FL (ref 82.6–102.9)
MICROORGANISM SPEC CULT: NO GROWTH
NRBC BLD-RTO: 0 PER 100 WBC
PLATELET # BLD AUTO: 181 K/UL (ref 138–453)
PMV BLD AUTO: 9.7 FL (ref 8.1–13.5)
POTASSIUM SERPL-SCNC: 3.6 MMOL/L (ref 3.7–5.3)
PROT SERPL-MCNC: 5.4 G/DL (ref 6.4–8.3)
RBC # BLD AUTO: 4.19 M/UL (ref 4.21–5.77)
SODIUM SERPL-SCNC: 140 MMOL/L (ref 135–144)
SPECIMEN DESCRIPTION: NORMAL
VANCOMYCIN SERPL-MCNC: 15.8 UG/ML
WBC OTHER # BLD: 7.5 K/UL (ref 3.5–11.3)

## 2024-05-21 PROCEDURE — 6360000002 HC RX W HCPCS: Performed by: INTERNAL MEDICINE

## 2024-05-21 PROCEDURE — 1200000000 HC SEMI PRIVATE

## 2024-05-21 PROCEDURE — 85027 COMPLETE CBC AUTOMATED: CPT

## 2024-05-21 PROCEDURE — 97530 THERAPEUTIC ACTIVITIES: CPT

## 2024-05-21 PROCEDURE — 6370000000 HC RX 637 (ALT 250 FOR IP): Performed by: FAMILY MEDICINE

## 2024-05-21 PROCEDURE — 6360000002 HC RX W HCPCS: Performed by: NURSE PRACTITIONER

## 2024-05-21 PROCEDURE — 97110 THERAPEUTIC EXERCISES: CPT

## 2024-05-21 PROCEDURE — 97116 GAIT TRAINING THERAPY: CPT

## 2024-05-21 PROCEDURE — 97535 SELF CARE MNGMENT TRAINING: CPT

## 2024-05-21 PROCEDURE — 82947 ASSAY GLUCOSE BLOOD QUANT: CPT

## 2024-05-21 PROCEDURE — 80053 COMPREHEN METABOLIC PANEL: CPT

## 2024-05-21 PROCEDURE — 2580000003 HC RX 258: Performed by: INTERNAL MEDICINE

## 2024-05-21 PROCEDURE — 80202 ASSAY OF VANCOMYCIN: CPT

## 2024-05-21 PROCEDURE — 2580000003 HC RX 258: Performed by: NURSE PRACTITIONER

## 2024-05-21 PROCEDURE — 83735 ASSAY OF MAGNESIUM: CPT

## 2024-05-21 PROCEDURE — 99232 SBSQ HOSP IP/OBS MODERATE 35: CPT | Performed by: INTERNAL MEDICINE

## 2024-05-21 PROCEDURE — 36415 COLL VENOUS BLD VENIPUNCTURE: CPT

## 2024-05-21 RX ORDER — AMLODIPINE BESYLATE 5 MG/1
5 TABLET ORAL DAILY
Status: DISCONTINUED | OUTPATIENT
Start: 2024-05-21 | End: 2024-05-22 | Stop reason: HOSPADM

## 2024-05-21 RX ADMIN — WATER 1000 MG: 1 INJECTION INTRAMUSCULAR; INTRAVENOUS; SUBCUTANEOUS at 12:21

## 2024-05-21 RX ADMIN — ASPIRIN 81 MG: 81 TABLET, COATED ORAL at 09:54

## 2024-05-21 RX ADMIN — MEMANTINE 5 MG: 5 TABLET ORAL at 09:54

## 2024-05-21 RX ADMIN — SODIUM CHLORIDE, PRESERVATIVE FREE 10 ML: 5 INJECTION INTRAVENOUS at 20:37

## 2024-05-21 RX ADMIN — DOXAZOSIN 4 MG: 4 TABLET ORAL at 20:34

## 2024-05-21 RX ADMIN — VANCOMYCIN HYDROCHLORIDE 750 MG: 750 INJECTION, POWDER, LYOPHILIZED, FOR SOLUTION INTRAVENOUS at 05:17

## 2024-05-21 RX ADMIN — METFORMIN HYDROCHLORIDE 1000 MG: 500 TABLET ORAL at 17:39

## 2024-05-21 RX ADMIN — QUETIAPINE FUMARATE 25 MG: 25 TABLET ORAL at 20:37

## 2024-05-21 RX ADMIN — METFORMIN HYDROCHLORIDE 1000 MG: 500 TABLET ORAL at 09:54

## 2024-05-21 RX ADMIN — MAGNESIUM SULFATE HEPTAHYDRATE 2000 MG: 40 INJECTION, SOLUTION INTRAVENOUS at 12:07

## 2024-05-21 RX ADMIN — SODIUM CHLORIDE, PRESERVATIVE FREE 10 ML: 5 INJECTION INTRAVENOUS at 09:53

## 2024-05-21 RX ADMIN — LOSARTAN POTASSIUM 100 MG: 100 TABLET, FILM COATED ORAL at 09:55

## 2024-05-21 RX ADMIN — VANCOMYCIN HYDROCHLORIDE 1000 MG: 1 INJECTION, POWDER, LYOPHILIZED, FOR SOLUTION INTRAVENOUS at 17:42

## 2024-05-21 RX ADMIN — ATORVASTATIN CALCIUM 80 MG: 80 TABLET, FILM COATED ORAL at 09:55

## 2024-05-21 RX ADMIN — APIXABAN 5 MG: 5 TABLET, FILM COATED ORAL at 20:35

## 2024-05-21 RX ADMIN — AMLODIPINE BESYLATE 5 MG: 5 TABLET ORAL at 11:01

## 2024-05-21 RX ADMIN — OMEGA-3-ACID ETHYL ESTERS 1 CAPSULE: 1 CAPSULE, LIQUID FILLED ORAL at 09:55

## 2024-05-21 RX ADMIN — FENOFIBRATE 160 MG: 160 TABLET ORAL at 09:54

## 2024-05-21 RX ADMIN — APIXABAN 5 MG: 5 TABLET, FILM COATED ORAL at 09:54

## 2024-05-21 ASSESSMENT — PAIN SCALES - GENERAL: PAINLEVEL_OUTOF10: 2

## 2024-05-21 ASSESSMENT — ENCOUNTER SYMPTOMS
GASTROINTESTINAL NEGATIVE: 1
RESPIRATORY NEGATIVE: 1

## 2024-05-21 NOTE — PROGRESS NOTES
Hospitalist Progress Note      Patient:  Keith Oreilly    Unit/Bed:2003/2003-02  YOB: 1940  MRN: [id number]   Acct: [id number]   PCP: Chaparrita Barrios MD  Date of Admission: 5/19/2024    Assessment/Plan:    Altered mental status   In the setting of AD.   Improving    Cellulitis  Facil  On IV abx  ID following   Monitor llabs    Atrial Flutter with tachybrady syndrome on anticoagulation  Rate controlled  On OAC - eliquis  Monitor hgb    Congestive heart failure/CAD s/p CABG x 4  Monitor I/O, UO, Low salt diet,   Diastolic dysfunction   Mild elevation of troponin.   Cardiology following.   No EKG changes.     Diabetes mellitus/hypertension/hyperlipidemia  Ac an Hs checks  ISS  Hypoglycemia protocol    АННА on CPAP  Recent lacunar infarct - continue home meds. Pt/ot ordered    Mild late onset Alzheimer's dementia with agitation  Diabetic polyneuropathy    Traumatic rhabdomyolysis  Continue IVF  Monitor lytes  Recheck CPK in am  Fall precautions    Chief Complaint: fall    Initial H and P:-    83-year-old male with past medical history of A-fib, CAD, CHF, diabetes, АННА on CPAP, Alzheimer's dementia, recent CVA, admitted to the hospital for complaints of altered mental status, weakness, fatigue. Patient is a very poor historian. There is no family present at bedside. Patient tells me that he fell. I suspect there is a evidence of that given that his CK levels are elevated. In the ER, there was concerns for pneumonia although I have low suspicion for that given that his workup is negative. Infectious disease, nephrology, cardiology were consulted. Upon my evaluation, patient does not give me enough information. I have reviewed his PCP notes from care everywhere. At this time, patient will be admitted to the hospital for further workup. He is on IV antibiotics. I have resumed his home medications. I have held his diuretics given his elevated CK levels. He is  Conjunctivae/corneas clear.  Neck: Supple, with full range of motion. No jugular venous distention. Trachea midline.  Respiratory:  Normal respiratory effort. Clear to auscultation, bilaterally without Rales/Wheezes/Rhonchi.  Cardiovascular: Regular rate and rhythm with normal S1/S2 without murmurs, rubs or gallops.  Abdomen: Soft, non-tender, non-distended with normal bowel sounds.  Musculoskeletal: passive and active ROM x 4 extremities.  Skin: Skin color, texture, turgor normal.  No rashes or lesions.  Neurologic: grossly non-focal.  Psychiatric: Alert and oriented,   Capillary Refill: Brisk,< 3 seconds   Peripheral Pulses: +2 palpable, equal bilaterally     Labs:   Recent Labs     05/19/24 2318 05/20/24 0637 05/21/24  0535   WBC 15.6* 16.9* 7.5   HGB 13.9 13.5 11.5*   HCT 43.5 42.3 37.0*    211 181     Recent Labs     05/19/24 2318 05/20/24 0637 05/21/24  0535    136 140   K 3.9 3.7 3.6*   CL 98 101 107   CO2 23 24 26   BUN 17 17 17   CREATININE 1.0 0.9 0.8   CALCIUM 9.3 8.8 8.3*     Recent Labs     05/19/24 2318 05/20/24 0637 05/21/24  0535   AST 26 53* 47*   ALT 19 22 26   BILITOT 0.9 0.8 0.4   ALKPHOS 50 47 43     Recent Labs     05/19/24 2318   INR 1.1     Recent Labs     05/19/24 2318 05/20/24  1154   CKTOTAL 562* 1,730*       Microbiology:    Blood culture #1: No results found for: \"BC\"    Blood culture #2:No results found for: \"BLOODCULT2\"    Organism:No results found for: \"ORG\"    No results found for: \"LABGRAM\"    MRSA culture only:No results found for: \"MRSAC\"    Urine culture: No results found for: \"LABURIN\"    Respiratory culture: No results found for: \"CULTRESP\"    Aerobic and Anaerobic :  No results found for: \"LABAERO\"  No results found for: \"LABANAE\"    Urinalysis:      Lab Results   Component Value Date/Time    NITRU NEGATIVE 05/20/2024 01:41 AM    WBCUA None 05/20/2024 01:41 AM    BACTERIA FEW 05/20/2024 01:41 AM    RBCUA 5 TO 10 05/20/2024 01:41 AM    GLUCOSEU NEGATIVE

## 2024-05-21 NOTE — CARE COORDINATION
Social work:  Spoke to Vishal [facility]/dtr and informed her [facility] is able to accept.   Therapy notes reviewed with dtr, since patient is walking min 50' min assist, she is considering patient coming home with HC instead.   Dtr and writer to touch base tomorrow after she talks with her siblings and mom.   Patient is current with Nicki Olsen/liaison updated.

## 2024-05-21 NOTE — CARE COORDINATION
[facility] Quality Flow/Interdisciplinary Rounds Progress Note    Quality Flow Rounds held on May 21, 2024 at 0930    Disciplines Attending:  Bedside Nurse, , , and Nursing Unit Leadership    Barriers to Discharge: SNF placement    Anticipated Discharge Date:   5/21/24    Anticipated Discharge Disposition: SNF    Readmission Risk              Risk of Unplanned Readmission:  25           Discussed patient goal for the day, patient clinical progression, and barriers to discharge.  RN reports patient is ready to discharge today. LSW following for SNF placement.      Kaity Schroeder RN  May 21, 2024

## 2024-05-21 NOTE — CARE COORDINATION
Social work: Spoke to Susy/[facility] admissions, she will review referral.   VM left for admissions/[facility] to f/u on status of referral. Await call back.     UPDATE: [facility] is able to accept patient, await c/b from [facility].   Spoke to Chaparrita/CELENA, patient is not ready for dc today.

## 2024-05-21 NOTE — PROGRESS NOTES
Nephrology Progress Note        Subjective: the patient is stable, afebrile, no SOB, no pain, no nausea, vomiting or diarrhea, good po intake, BP elevated,  afebrile, good UOP    Objective:  CURRENT TEMPERATURE:  Temp: 98.4 °F (36.9 °C)  MAXIMUM TEMPERATURE OVER 24HRS:  Temp (24hrs), Av.4 °F (36.9 °C), Min:98.4 °F (36.9 °C), Max:98.4 °F (36.9 °C)    CURRENT RESPIRATORY RATE:  Respirations: 20  CURRENT PULSE:  Pulse: 54  CURRENT BLOOD PRESSURE:  BP: (!) 142/60  24HR BLOOD PRESSURE RANGE:  Systolic (24hrs), Av , Min:142 , Max:144   ; Diastolic (24hrs), Av, Min:60, Max:76    24HR INTAKE/OUTPUT:    Intake/Output Summary (Last 24 hours) at 2024 0653  Last data filed at 2024 0339  Gross per 24 hour   Intake 1806.46 ml   Output 650 ml   Net 1156.46 ml     Weight   Wt Readings from Last 3 Encounters:   24 117.9 kg (260 lb)   24 119 kg (262 lb 6.4 oz)   24 131 kg (288 lb 12.8 oz)       Physical Exam:  Awake, alert, in no acute distress  Skin: warm and dry, no rash or erythema  Pulmonary: clear to auscultation bilaterally- no wheezes, rales or rhonchi, normal air movement, no respiratory distress  Cardiovascular: Normal S1 & S2  Abdomen: soft nontender, bowel sounds present, no organomegaly,  no ascites  Extremities: no cyanosis, clubbing or edema    Current Medications:    sodium chloride flush 0.9 % injection 5-40 mL, 2 times per day  sodium chloride flush 0.9 % injection 5-40 mL, PRN  0.9 % sodium chloride infusion, PRN  ondansetron (ZOFRAN-ODT) disintegrating tablet 4 mg, Q8H PRN   Or  ondansetron (ZOFRAN) injection 4 mg, Q6H PRN  sodium chloride flush 0.9 % injection 10 mL, 2 times per day  sodium chloride flush 0.9 % injection 10 mL, PRN  0.9 % sodium chloride infusion, PRN  potassium chloride (KLOR-CON M) extended release tablet 40 mEq, PRN   Or  potassium bicarb-citric acid (EFFER-K) effervescent tablet 40 mEq, PRN   Or  potassium chloride 10 mEq/100 mL IVPB (Peripheral Line),

## 2024-05-21 NOTE — PROGRESS NOTES
Physical Therapy  Facility/Department: [facility]  Rehabilitation Physical Therapy Treatment Note    NAME: Keith Oreilly  : 1940 (83 y.o.)  MRN: [id number]  CODE STATUS: Full Code    Date of Service: 24       Restrictions:  Restrictions/Precautions: General Precautions, Fall Risk, Contact Precautions, Up as Tolerated  Position Activity Restriction  Other position/activity restrictions: Up w/ assist, RUE IV, telemetry     SUBJECTIVE  Subjective  Subjective: Patient up in side chair upon therapists arrival. RN reports patient is medically stable for therapy treatment this date.    Chart reviewed prior to treatment and patient is agreeable for therapy.  All lines intact and patient positioned comfortably at end of treatment.  All patient needs addressed prior to ending therapy session.  Chair alarm in place and tested to ensure patient safety.               OBJECTIVE  Cognition  Overall Cognitive Status: Exceptions  Arousal/Alertness: Appropriate responses to stimuli;Delayed responses to stimuli  Following Commands: Follows one step commands with increased time;Follows one step commands with repetition  Attention Span: Attends with cues to redirect  Memory: Decreased short term memory;Decreased recall of recent events  Safety Judgement: Decreased awareness of need for safety;Decreased awareness of need for assistance  Problem Solving: Decreased awareness of errors;Assistance required to correct errors made;Assistance required to identify errors made;Assistance required to implement solutions;Assistance required to generate solutions  Insights: Decreased awareness of deficits  Initiation: Requires cues for all  Sequencing: Requires cues for all  Cognition Comment: .  Orientation  Overall Orientation Status: Impaired  Orientation Level: Oriented to person;Oriented to time;Oriented to place;Disoriented to situation    Functional Mobility  Scooting  Assistance Level: Contact guard assist  Transfers  Surface:  Recommendations: Strengthening;Balance training;Functional mobility training;Transfer training;Gait training;Stair training;Neuromuscular re-education;Home exercise program;Endurance training;Pain management;Safety education & training;Patient/Caregiver education & training;Equipment evaluation, education, & procurement;Therapeutic activities  Safety Devices  Type of Devices: Call light within reach;Gait belt;All fall risk precautions in place;Left in chair;Chair alarm in place    EDUCATION  Education  Education Given To: Patient  Education Provided: Role of Therapy;Safety;Plan of Care;ADL Function;Home Exercise Program;IADL Function;Precautions;Mobility Training  Education Method: Verbal  Barriers to Learning: Cognition  Education Outcome: Continued education needed;Verbalized understanding    AM-PAC Score    AM-PAC Inpatient Mobility Raw Score : 17  AM-PAC Inpatient T-Scale Score : 42.13  Mobility Inpatient CMS 0-100% Score: 50.57  Mobility Inpatient CMS G-Code Modifier:CK        Therapy Time   Individual Concurrent Group Co-treatment   Time In 1130         Time Out 1209         Minutes 39                   DANIELLE RUSSELL PTA, 05/21/24 at 12:42 PM

## 2024-05-21 NOTE — PLAN OF CARE
Problem: Discharge Planning  Goal: Discharge to home or other facility with appropriate resources  Outcome: Progressing  Flowsheets (Taken 5/20/2024 2000)  Discharge to home or other facility with appropriate resources:   Identify barriers to discharge with patient and caregiver   Arrange for needed discharge resources and transportation as appropriate   Identify discharge learning needs (meds, wound care, etc)   Refer to discharge planning if patient needs post-hospital services based on physician order or complex needs related to functional status, cognitive ability or social support system     Problem: Safety - Adult  Goal: Free from fall injury  5/20/2024 2351 by Davdi Morrow, RN  Outcome: Progressing  5/20/2024 1201 by Joel Payton, RN  Flowsheets (Taken 5/20/2024 1201)  Free From Fall Injury: Instruct family/caregiver on patient safety  Note: Patient admitted s/p fall, Rhabdo patient hx dementia. Alert to self only. Patient up with walker two assist. Patient with bed alarm, fall risk ,hourly rounding, side rails x2  bed to lowest position, no skid slippers

## 2024-05-21 NOTE — PROGRESS NOTES
[facility]   Pharmacy Pharmacokinetic Monitoring Service - Vancomycin    Consulting Provider: Dr. Yao   Indication: cellulitis of face  Target Concentration: Goal AUC/-600 mg*hr/L  Day of Therapy: 2  Additional Antimicrobials: Rocephin    Pertinent Laboratory Values:   Wt Readings from Last 1 Encounters:   05/19/24 117.9 kg (260 lb)     Temp Readings from Last 1 Encounters:   05/21/24 98.2 °F (36.8 °C) (Oral)     Estimated Creatinine Clearance: 93 mL/min (based on SCr of 0.8 mg/dL).  Recent Labs     05/20/24  0637 05/21/24  0535   CREATININE 0.9 0.8   BUN 17 17   WBC 16.9* 7.5     Procalcitonin: 0.4      Recent vancomycin administrations                     vancomycin (VANCOCIN) 750 mg in sodium chloride 0.9 % 250 mL IVPB (mg) 750 mg New Bag 05/21/24 0517     750 mg New Bag 05/20/24 1948    vancomycin (VANCOCIN) 2500 mg in sodium chloride 0.9 % 500 mL IVPB (mg) 2,500 mg New Bag 05/20/24 1359                    Assessment:  Date/Time Current Dose Concentration Timing of Concentration (h) AUC   5/21 750mg IV q 8 hrs 15.8 mg/L 6 hr 48 min 493    Note: Serum concentrations collected for AUC dosing may appear elevated if collected in close proximity to the dose administered, this is not necessarily an indication of toxicity    Plan:  Current dosing regimen is therapeutic, however, trough is above desired.  \"Decrease dose to 1000mg IV q 12 hours  Repeat vancomycin concentration ordered for 5/22 @ 1200   Pharmacy will continue to monitor patient and adjust therapy as indicated    Thank you for the consult,  Alma Bolivar [facility]  5/21/2024 12:59 PM

## 2024-05-21 NOTE — PROGRESS NOTES
[facility]  Progress Note    Keith Oreilly  MRN: [id number]  Date: 5/21/2024  LOS: 1     Reason for F/U :   Altered mental status    Impression :   Right auricle and upper facial cellulitis  Altered mental status likely related to above  Diabetes mellitus type 2  Coronary artery disease  Dementia    Recommendations:   Continue intravenous antimicrobial therapy with Rocephin and vancomycin  Clinically the cellulitic changes seem to improve  The patient remains the same and is not toxic appearing  The patient was working with PT/OT at the time of my evaluation    Infection Control Recommendations:   Universal precaution    Discharge Planning:   Estimated Length of IV antimicrobials: To be determined  Patient will need Midline Catheter Insertion/ PICC line Insertion: No  Patient will need: Home IV , Infusion Center,  SNF,  LTAC: Undetermined  Patient willneed outpatient wound care: No    Medical Decision making / Summary of Stay:   Keith Oreilly is a 83 y.o.-year-old male who was initially admitted on 5/19/2024.    Keith is seen and evaluated at bedside he is a very poor historian and the history is obtained from his wife Karlie who I called on the phone.     Keith has diabetes mellitus type 2, coronary artery disease, congestive heart failure, paroxysmal atrial fibrillation, hypertension, hyperlipidemia, morbid obesity, tachybradycardia syndrome, arthritis and cognitive impairment/dementia, obstructive sleep apnea on CPAP.      He was recently hospitalized here after an acute cerebral infarction/CVA and rhabdomyolysis related to a fall.  The patient was discharged to acute rehab and had made at home and the wife reports that he was doing well walking with a walker but starting on Saturday she noticed that he was disoriented he was not really listening to her he was trying to get out of the bed to go to the bathroom multiple times.  She also noticed that his right earlobe was red on Saturday and  Rhythm: Normal rate.      Heart sounds: Normal heart sounds.      No friction rub. No gallop.   Pulmonary:      Effort: Pulmonary effort is normal.      Breath sounds: Normal breath sounds. No wheezing.   Abdominal:      General: Bowel sounds are normal.      Palpations: Abdomen is soft. There is no mass.      Tenderness: There is no abdominal tenderness.   Lymphadenopathy:      Cervical: No cervical adenopathy.   Skin:     General: Skin is warm and dry.      Comments: The cheeks bilaterally and the forehead have erythematous changes consistent with cellulitis   Neurological:      Mental Status: He is alert and oriented to person, place, and time.         Laboratory data:   I have independently reviewed the followinglabs:  CBC with Differential:   Recent Labs     05/19/24  2318 05/20/24  0637 05/21/24  0535   WBC 15.6* 16.9* 7.5   HGB 13.9 13.5 11.5*   HCT 43.5 42.3 37.0*    211 181   LYMPHOPCT 5*  --   --    MONOPCT 6  --   --    EOSPCT 0*  --   --      BMP:   Recent Labs     05/20/24  0637 05/21/24  0535    140   K 3.7 3.6*    107   CO2 24 26   BUN 17 17   CREATININE 0.9 0.8   MG  --  1.6     Hepatic Function Panel:   Recent Labs     05/20/24  0637 05/21/24  0535   BILITOT 0.8 0.4   ALKPHOS 47 43   ALT 22 26   AST 53* 47*         Lab Results   Component Value Date/Time    PROCAL 0.40 05/19/2024 11:18 PM    PROCAL 4.09 03/23/2024 01:31 PM     No results found for: \"CRP\"  No results found for: \"SEDRATE\"      No results found for: \"DDIMER\"  No results found for: \"FERRITIN\"  No results found for: \"LDH\"  No results found for: \"FIBRINOGEN\"    Results in Past 30 Days  Result Component Current Result Ref Range Previous Result Ref Range   SARS-CoV-2 RNA, RT PCR Not Detected (5/19/2024) Not Detected Not in Time Range      Lab Results   Component Value Date/Time    COVID19 Not Detected 05/19/2024 11:24 PM    COVID19 Not Detected 03/23/2024 10:30 AM    COVID19 Not Detected 11/28/2020 02:40 PM       No

## 2024-05-21 NOTE — PLAN OF CARE
Problem: Discharge Planning  Goal: Discharge to home or other facility with appropriate resources  Outcome: Progressing  Flowsheets (Taken 5/21/2024 1030)  Discharge to home or other facility with appropriate resources:   Identify barriers to discharge with patient and caregiver   Arrange for needed discharge resources and transportation as appropriate   Identify discharge learning needs (meds, wound care, etc)     Problem: Safety - Adult  Goal: Free from fall injury  Outcome: Progressing     Problem: Skin/Tissue Integrity - Adult  Goal: Skin integrity remains intact  Outcome: Progressing  Flowsheets  Taken 5/21/2024 1528  Skin Integrity Remains Intact: Monitor for areas of redness and/or skin breakdown  Taken 5/21/2024 1030  Skin Integrity Remains Intact: Monitor for areas of redness and/or skin breakdown     Problem: Neurosensory - Adult  Goal: Achieves stable or improved neurological status  Outcome: Progressing  Flowsheets (Taken 5/21/2024 1030)  Achieves stable or improved neurological status:   Assess for and report changes in neurological status   Monitor temperature, glucose, and sodium. Initiate appropriate interventions as ordered

## 2024-05-21 NOTE — PROGRESS NOTES
Occupational Therapy  Facility/Department: [facility]  Rehabilitation Occupational Therapy Daily Treatment Note    Date: 24  Patient Name: Keith Oreilly       Room:   MRN: [id number]  Account: [id number]   : 1940  (83 y.o.) Gender: male                    Past Medical History:  has a past medical history of Arthritis, Atrial fibrillation (HCC), CAD (coronary artery disease), CHF (congestive heart failure) (HCC), Diabetes (HCC), Diverticulosis, Hyperlipidemia, Obesity, АННА on CPAP, Poor historian, and Renal stone.  Past Surgical History:   has a past surgical history that includes Appendectomy; other surgical history; Colonoscopy; Cardiac surgery; Cardiac catheterization (2020); joint replacement; and Bunionectomy (Right, 12/3/2020).    Restrictions  Restrictions/Precautions: General Precautions, Fall Risk, Contact Precautions, Up as Tolerated  Other position/activity restrictions: Up w/ assist, RUE IV, telemetry  Required Braces or Orthoses?: No    Subjective  Subjective: Pt in bed upon arrival. PCT present taking vitals. Writer stated to pt it was time to get up out of bed, pt stated \"Why?\" Writer expressed to pt that his doctors ordered therapy and want him up and out of bed during the day, pt agreeable.  Restrictions/Precautions: General Precautions;Fall Risk;Contact Precautions;Up as Tolerated             Objective     Cognition  Overall Cognitive Status: Exceptions  Arousal/Alertness: Appropriate responses to stimuli;Delayed responses to stimuli  Following Commands: Follows one step commands with increased time;Follows one step commands with repetition  Attention Span: Attends with cues to redirect  Memory: Decreased short term memory;Decreased recall of recent events  Safety Judgement: Decreased awareness of need for safety;Decreased awareness of need for assistance  Problem Solving: Decreased awareness of errors;Assistance required to correct errors made;Assistance required  increased B UE strength by 1/2 grade to assist with functional tasks/I with  BUE HEP with use of handouts as needed.  Short Term Goal 3: bed mobility to Mod I with use of bedrails as needed.  Short Term Goal 4: toileting to SBA with use of AD/grab bars as needed.  Short Term Goal 5: UB ADLs to Set up and LB ADLs to Min A with use of AD/AE and verbal cues as needed.  Long Term Goals  Long Term Goal 1: Pt/caregiver to be I with fall prevention edu, EC/WS tech, recommendations for discharge/AE, condition specific edu, pressure relief/skin integrity edu with use of handouts as needed.    AM-PAC Score        AM-PAC Inpatient Daily Activity Raw Score: 14 (05/21/24 1123)  AM-PAC Inpatient ADL T-Scale Score : 33.39 (05/21/24 1123)  ADL Inpatient CMS 0-100% Score: 59.67 (05/21/24 1123)  ADL Inpatient CMS G-Code Modifier : CK (05/21/24 1123)      Therapy Time   Individual Concurrent Group Co-treatment   Time In 1103         Time Out 1127         Minutes 24                 JAMES Mccollum

## 2024-05-21 NOTE — CONSULTS
History of Present Illness  This is a   84 y/o male brought to the ED after a fall with MS changes.  Pt was thought to have facial cellulitis, dehydration and rhabdomyolysis.  He has been treated with Abx and hydration while holding his diuretics.  Cardiology consult was obtained due to elevated troponin and intermittent asymptomatic bradycardia. Pt is more alert and appropriate this afternoon.  He denies any chest pain, dyspnea, orthopnea or PND.  He denies any dizziness.         Patient History  - Coronary artery disease, status post CABG x4 done 9/2/09 with LIMA to the LAD, SVG to OM2, SVG to OM3, and SVG to the distal RCA. Cardiac catheterization done 9/2/2020 revealed the LMCA to have 60-70% distal stenosis, LAD had mild irregularities 20-30%, Diag1 had 80% ostial stenosis, Circ had 99% proximal stenosis compromising the large AV groove branch, OM1 was occluded, OM2 was occluded, OM3 was occluded, RCA was occluded proximally, Ramus had mild irregularities 30-40%, LIMA to the LAD was patent with compromised flow due to thoracic branch being intact,  - Hypercholesterolemia and hypertriglyceridemia  - telemetry monitor done 4/1-4/21/21 revealed prevailing atrial fibrillation/flutter with average HR at 65 bpm, minimum HR at 36 bpm, maximum HR at 89 bpm, 3.3 second pause  - Hypertension  - atrial fibrillation/flutter  - CHF  - diverticulosis  - Decreased testosterone  - Hyperglycemic  - Sleep apnea, uses CPAP  - asthma  - Restless leg syndrome  - T & A  - appendectomy  - 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  - Left total knee replacement  - carpal tunnel release  - Normal LV size and systolic function with an EF of 75%, mild LVH, moderately dilated LA, moderately dilated RA, moderately dilated RV, mildly calcified aortic valve, trace MR, mild TR, and trace PI documented on a technically difficult echocardiogram done 8/10/20    Social History:  Reviewed UTD    Family History:  Reviewed UTD      Allergies  LISINOPRIL       Medications (home)  --Trulicity 0.75 MG/0.5 ML Solution Injection once a week  --Glimepiride 4 MG Tablet 2 po qday  --Fenofibrate 145 MG Tablet TAKE ONE TABLET BY MOUTH DAILY Chris:30 Refill: 0  --Eliquis 5 MG Tablet TAKE ONE TABLET BY MOUTH TWICE A DAY Chris:60 Refill: 0  --Lasix 20 MG Tablet Take 1 tablet 2-3 x week as needed Chris:36 Refill: 3  --Lipitor Oral Tablet 80 MG TAKE 1 TABLET BY MOUTH ONE TIME A DAY Chris:30 Refill: 6  --Melatonin 3 MG Tablet 1 po qHS  --Multivitamin Tablet 1 po qday  --Vitamin D3 1000 IU Tablet 1 po qday  --Vitamin B Complex Capsule 1 po qday  --Mucinex 600 MG Tablet, Extended Release PRN  --metFORMIN HCl 500 MG Tablet 2 tabs bid  --Magnesium Oxide 400 MG Tablet PRN  --Fish Oil 1000 MG Capsule, Liquid Filled 1 po qday  --Cranberry 250 MG Capsule 1 po qday  --Aspirin 81 MG Tablet, Enteric Coated take 1 tab po qd     Review of Systems  As per HPI      Physical Examination  General:    up in a chair, appropriate, and alert X3   Neck:    Flat, no carotid bruits    Lungs:    Clear to auscultation    Cardio:    No gallop, No murmur, Irregular rate and rhythm, Bradycardic rate and rhythm    Abdomen:    Soft and no hepatojugular reflux    Extremities:    No leg edema       Reviewed Data    Most recent EKG  A flutter, RBBB  Minimal elevated troponin  Elevated CPK and myoglobin.  Rhythm strips overnight reveal A flutter with intermittent Lio cardia down to 30 bpm while sleeping with normal BP      Impression and Plan    A flutter with tachybrady syndrome  pt has not shown any pauses to mandate .  He hasn't had significant bradycardia while awake. Pt's bradycardia has no hemodynamic significance at this point.  Would just monitor for now.  Continue eliquis for now.  CAD with minimal increase in troponin.  Pt has no chest pain and show no ischemic EKG changes.  Troponin increase is 2nd to rhabdomyolysis.  No further w/u is needed.  H/o CHF  Lv diastolic dysfunction.  Pt is not in fluid

## 2024-05-22 VITALS
HEIGHT: 71 IN | RESPIRATION RATE: 18 BRPM | SYSTOLIC BLOOD PRESSURE: 138 MMHG | TEMPERATURE: 97.5 F | WEIGHT: 260 LBS | HEART RATE: 54 BPM | OXYGEN SATURATION: 98 % | BODY MASS INDEX: 36.4 KG/M2 | DIASTOLIC BLOOD PRESSURE: 60 MMHG

## 2024-05-22 PROBLEM — M62.82 RHABDOMYOLYSIS: Status: RESOLVED | Noted: 2024-05-20 | Resolved: 2024-05-22

## 2024-05-22 LAB
ALBUMIN SERPL-MCNC: 3.1 G/DL (ref 3.5–5.2)
ALP SERPL-CCNC: 57 U/L (ref 40–129)
ALT SERPL-CCNC: 27 U/L (ref 5–41)
ANION GAP SERPL CALCULATED.3IONS-SCNC: 8 MMOL/L (ref 9–17)
AST SERPL-CCNC: 36 U/L
BILIRUB SERPL-MCNC: 0.2 MG/DL (ref 0.3–1.2)
BUN SERPL-MCNC: 15 MG/DL (ref 8–23)
BUN/CREAT SERPL: 19 (ref 9–20)
CALCIUM SERPL-MCNC: 8.2 MG/DL (ref 8.6–10.4)
CHLORIDE SERPL-SCNC: 108 MMOL/L (ref 98–107)
CHOLEST SERPL-MCNC: 100 MG/DL (ref 0–199)
CHOLESTEROL/HDL RATIO: 3
CK SERPL-CCNC: 482 U/L (ref 39–308)
CO2 SERPL-SCNC: 25 MMOL/L (ref 20–31)
CREAT SERPL-MCNC: 0.8 MG/DL (ref 0.7–1.2)
ERYTHROCYTE [DISTWIDTH] IN BLOOD BY AUTOMATED COUNT: 14.6 % (ref 11.8–14.4)
EST. AVERAGE GLUCOSE BLD GHB EST-MCNC: 126 MG/DL
GFR, ESTIMATED: 88 ML/MIN/1.73M2
GLUCOSE BLD-MCNC: 127 MG/DL (ref 75–110)
GLUCOSE BLD-MCNC: 141 MG/DL (ref 75–110)
GLUCOSE BLD-MCNC: 184 MG/DL (ref 75–110)
GLUCOSE SERPL-MCNC: 126 MG/DL (ref 70–99)
HBA1C MFR BLD: 6 % (ref 4–6)
HCT VFR BLD AUTO: 36.3 % (ref 40.7–50.3)
HDLC SERPL-MCNC: 31 MG/DL
HGB BLD-MCNC: 11.6 G/DL (ref 13–17)
LDLC SERPL CALC-MCNC: 43 MG/DL (ref 0–100)
MAGNESIUM SERPL-MCNC: 1.9 MG/DL (ref 1.6–2.6)
MCH RBC QN AUTO: 27.7 PG (ref 25.2–33.5)
MCHC RBC AUTO-ENTMCNC: 32 G/DL (ref 28.4–34.8)
MCV RBC AUTO: 86.6 FL (ref 82.6–102.9)
NRBC BLD-RTO: 0 PER 100 WBC
PLATELET # BLD AUTO: 186 K/UL (ref 138–453)
PMV BLD AUTO: 9.7 FL (ref 8.1–13.5)
POTASSIUM SERPL-SCNC: 3.5 MMOL/L (ref 3.7–5.3)
PROT SERPL-MCNC: 5.4 G/DL (ref 6.4–8.3)
RBC # BLD AUTO: 4.19 M/UL (ref 4.21–5.77)
SODIUM SERPL-SCNC: 141 MMOL/L (ref 135–144)
TRIGL SERPL-MCNC: 130 MG/DL
VANCOMYCIN SERPL-MCNC: 16.1 UG/ML
VLDLC SERPL CALC-MCNC: 26 MG/DL
WBC OTHER # BLD: 6.4 K/UL (ref 3.5–11.3)

## 2024-05-22 PROCEDURE — 97530 THERAPEUTIC ACTIVITIES: CPT

## 2024-05-22 PROCEDURE — 2580000003 HC RX 258: Performed by: INTERNAL MEDICINE

## 2024-05-22 PROCEDURE — 80202 ASSAY OF VANCOMYCIN: CPT

## 2024-05-22 PROCEDURE — 80053 COMPREHEN METABOLIC PANEL: CPT

## 2024-05-22 PROCEDURE — 85027 COMPLETE CBC AUTOMATED: CPT

## 2024-05-22 PROCEDURE — 36415 COLL VENOUS BLD VENIPUNCTURE: CPT

## 2024-05-22 PROCEDURE — 6370000000 HC RX 637 (ALT 250 FOR IP): Performed by: FAMILY MEDICINE

## 2024-05-22 PROCEDURE — 80061 LIPID PANEL: CPT

## 2024-05-22 PROCEDURE — 83036 HEMOGLOBIN GLYCOSYLATED A1C: CPT

## 2024-05-22 PROCEDURE — 6360000002 HC RX W HCPCS: Performed by: INTERNAL MEDICINE

## 2024-05-22 PROCEDURE — 83735 ASSAY OF MAGNESIUM: CPT

## 2024-05-22 PROCEDURE — 82550 ASSAY OF CK (CPK): CPT

## 2024-05-22 PROCEDURE — 97116 GAIT TRAINING THERAPY: CPT

## 2024-05-22 PROCEDURE — 97110 THERAPEUTIC EXERCISES: CPT

## 2024-05-22 PROCEDURE — 99232 SBSQ HOSP IP/OBS MODERATE 35: CPT | Performed by: NURSE PRACTITIONER

## 2024-05-22 PROCEDURE — 6370000000 HC RX 637 (ALT 250 FOR IP): Performed by: NURSE PRACTITIONER

## 2024-05-22 PROCEDURE — 2580000003 HC RX 258: Performed by: NURSE PRACTITIONER

## 2024-05-22 PROCEDURE — 82947 ASSAY GLUCOSE BLOOD QUANT: CPT

## 2024-05-22 RX ORDER — AMLODIPINE BESYLATE 5 MG/1
5 TABLET ORAL DAILY
Qty: 30 TABLET | Refills: 0 | Status: SHIPPED | OUTPATIENT
Start: 2024-05-23

## 2024-05-22 RX ORDER — CEFDINIR 300 MG/1
300 CAPSULE ORAL 2 TIMES DAILY
Qty: 20 CAPSULE | Refills: 0 | Status: SHIPPED | OUTPATIENT
Start: 2024-05-22 | End: 2024-06-01

## 2024-05-22 RX ORDER — DOXYCYCLINE HYCLATE 100 MG
100 TABLET ORAL 2 TIMES DAILY
Qty: 20 TABLET | Refills: 0 | Status: SHIPPED | OUTPATIENT
Start: 2024-05-22 | End: 2024-06-01

## 2024-05-22 RX ADMIN — MEMANTINE 5 MG: 5 TABLET ORAL at 09:13

## 2024-05-22 RX ADMIN — LOSARTAN POTASSIUM 100 MG: 100 TABLET, FILM COATED ORAL at 09:13

## 2024-05-22 RX ADMIN — METFORMIN HYDROCHLORIDE 1000 MG: 500 TABLET ORAL at 09:13

## 2024-05-22 RX ADMIN — VANCOMYCIN HYDROCHLORIDE 1000 MG: 1 INJECTION, POWDER, LYOPHILIZED, FOR SOLUTION INTRAVENOUS at 04:26

## 2024-05-22 RX ADMIN — ASPIRIN 81 MG: 81 TABLET, COATED ORAL at 09:13

## 2024-05-22 RX ADMIN — APIXABAN 5 MG: 5 TABLET, FILM COATED ORAL at 09:13

## 2024-05-22 RX ADMIN — POTASSIUM CHLORIDE 40 MEQ: 1500 TABLET, EXTENDED RELEASE ORAL at 09:24

## 2024-05-22 RX ADMIN — ATORVASTATIN CALCIUM 80 MG: 80 TABLET, FILM COATED ORAL at 09:13

## 2024-05-22 RX ADMIN — WATER 1000 MG: 1 INJECTION INTRAMUSCULAR; INTRAVENOUS; SUBCUTANEOUS at 12:25

## 2024-05-22 RX ADMIN — SODIUM CHLORIDE, PRESERVATIVE FREE 10 ML: 5 INJECTION INTRAVENOUS at 09:15

## 2024-05-22 RX ADMIN — METFORMIN HYDROCHLORIDE 1000 MG: 500 TABLET ORAL at 17:49

## 2024-05-22 RX ADMIN — AMLODIPINE BESYLATE 5 MG: 5 TABLET ORAL at 09:13

## 2024-05-22 RX ADMIN — FENOFIBRATE 160 MG: 160 TABLET ORAL at 09:13

## 2024-05-22 RX ADMIN — OMEGA-3-ACID ETHYL ESTERS 1 CAPSULE: 1 CAPSULE, LIQUID FILLED ORAL at 09:13

## 2024-05-22 ASSESSMENT — ENCOUNTER SYMPTOMS
RESPIRATORY NEGATIVE: 1
GASTROINTESTINAL NEGATIVE: 1

## 2024-05-22 NOTE — PROGRESS NOTES
Occupational Therapy  [facility]  Occupational Therapy Not Seen    DATE: 2024    NAME: Keith Oreilly  MRN: [id number]   : 1940    Patient not seen this date for Occupational Therapy due to:      [] Cancel by RN or physician due to:    [] Hemodialysis    [] Critical Lab Value Level     [] Blood transfusion in progress    [] Acute or unstable cardiovascular status   _MAP < 55 or more than >115  _HR < 40 or > 130    [] Acute or unstable pulmonary status   -FiO2 > 60%   _RR < 5 or >40    _O2 sats < 85%    [] Strict Bedrest    [] Off Unit for surgery or procedure    [] Off Unit for testing       [] Pending imaging to R/O fracture    [] Refusal by Patient      [x] Other: Pt working with physical therapy, will check back if time allows.      [] OT being discontinued at this time. Patient independent. No further needs.     [] OT being discontinued at this time as the patient has been transferred to hospice care. No further needs.      Shannon Maria JAMES

## 2024-05-22 NOTE — PROGRESS NOTES
Physical Therapy  Facility/Department: [facility] SURG  Rehabilitation Physical Therapy Treatment Note    NAME: Keith Oreilly  : 1940 (83 y.o.)  MRN: [id number]  CODE STATUS: Full Code    Date of Service: 24       Restrictions:  Restrictions/Precautions: General Precautions, Fall Risk, Contact Precautions, Up as Tolerated  Position Activity Restriction  Other position/activity restrictions: Up w/ assist, RUE IV, telemetry     SUBJECTIVE  Subjective  Subjective: pt in bed upon arrival agreeable to PT               OBJECTIVE  Cognition  Overall Cognitive Status: Exceptions  Arousal/Alertness: Appropriate responses to stimuli;Delayed responses to stimuli  Following Commands: Follows one step commands with increased time;Follows one step commands with repetition  Attention Span: Attends with cues to redirect  Memory: Decreased short term memory;Decreased recall of recent events  Safety Judgement: Decreased awareness of need for safety;Decreased awareness of need for assistance  Problem Solving: Decreased awareness of errors;Assistance required to correct errors made;Assistance required to identify errors made;Assistance required to implement solutions;Assistance required to generate solutions  Insights: Not aware of deficits  Initiation: Requires cues for all  Sequencing: Requires cues for all  Orientation  Overall Orientation Status: Impaired  Orientation Level: Oriented to person;Oriented to time;Oriented to place;Disoriented to situation    Functional Mobility  Bed Mobility  Overall Assistance Level: Minimal Assistance  Roll Left  Assistance Level: Minimal assistance  Supine to Sit  Assistance Level: Minimal assistance  Scooting  Assistance Level: Minimal assistance  Transfers  Surface: From bed;Standard toilet;To chair with arms  Additional Factors: Verbal cues  Device: Walker  Sit to Stand  Assistance Level: Minimal assistance  Stand to Sit  Assistance Level: Minimal assistance  Bed To/From  Chair  Technique: Stand pivot;Stand step  Assistance Level: Contact guard assist  Stand Pivot  Assistance Level: Contact guard assist      Environmental Mobility  Ambulation  Surface: Level surface  Device: Rolling walker  Distance: 65 ft  Activity: Within Room  Activity Comments: gait steady  Additional Factors: Verbal cues  Assistance Level: Contact guard assist  Gait Deviations: Decreased step length bilateral             PT Exercises  Exercise Treatment: SEATED LE AROM X 10 REPS      ASSESSMENT/PROGRESS TOWARDS GOALS  Vital Signs  BP Location: Left upper arm  O2 Device: None (Room air)    Assessment  Assessment: pt progressing toward goals able to increase amb. distance this ession demo good walker safety CGA/SBA for all mobility, pt able to use bathroom for toileting independently  Activity Tolerance: Patient tolerated treatment well;Patient limited by fatigue;Patient limited by endurance;Treatment limited secondary to decreased cognition  Discharge Recommendations: Patient would benefit from continued therapy after discharge    Goals  Patient Goals   Patient Goals : To go home  Short Term Goals  Time Frame for Short Term Goals: 12 visits  Short Term Goal 1: Pt to demonstrate bed mobility independently  Short Term Goal 2: Pt to perform STS transfers independently  Short Term Goal 3: Pt to ambulate at least 75ft w/ RW independently  Short Term Goal 4: Pt to ascend/descend 5 stairs w/ handrails SBA  Short Term Goal 5: Pt to actively participate in at least 30 minutes of physical therapy for ther act, ther ex, balance, gait, and endurance training    PLAN OF CARE/SAFETY  Physical Therapy Plan  General Plan: 5-7 times per week  Current Treatment Recommendations: Strengthening;Balance training;Functional mobility training;Transfer training;Gait training;Stair training;Neuromuscular re-education;Home exercise program;Endurance training;Pain management;Safety education & training;Patient/Caregiver education &  training;Equipment evaluation, education, & procurement;Therapeutic activities  Safety Devices  Type of Devices: Call light within reach;Gait belt;All fall risk precautions in place;Left in chair;Chair alarm in place    EDUCATION  Education  Education Given To: Patient  Education Provided: Role of Therapy;Safety;Plan of Care;ADL Function;Home Exercise Program;IADL Function;Precautions;Mobility Training  Education Method: Verbal  Barriers to Learning: Cognition  Education Outcome: Continued education needed;Verbalized understanding    AM-PAC Score    AM-PAC Inpatient Mobility Raw Score : 17  AM-PAC Inpatient T-Scale Score : 42.13  Mobility Inpatient CMS 0-100% Score: 50.57  Mobility Inpatient CMS G-Code Modifier:CK        Therapy Time   Individual Concurrent Group Co-treatment   Time In 1015         Time Out 1055         Minutes 40                   DANIELLE RUSSELL PTA, 05/22/24 at 2:45 PM

## 2024-05-22 NOTE — PLAN OF CARE
Problem: Discharge Planning  Goal: Discharge to home or other facility with appropriate resources  5/22/2024 1452 by Tenzin Rain RN  Outcome: Adequate for Discharge     Problem: Safety - Adult  Goal: Free from fall injury  5/22/2024 1452 by Tenzin Rain RN  Outcome: Adequate for Discharge     Problem: ABCDS Injury Assessment  Goal: Absence of physical injury  5/22/2024 1452 by Tenzin Rain RN  Outcome: Adequate for Discharge     Problem: Skin/Tissue Integrity - Adult  Goal: Skin integrity remains intact  5/22/2024 1452 by Tenzin Rain RN  Outcome: Adequate for Discharge     Problem: Musculoskeletal - Adult  Goal: Return mobility to safest level of function  5/22/2024 1452 by Tenzin Rain RN  Outcome: Adequate for Discharge     Problem: Musculoskeletal - Adult  Goal: Return ADL status to a safe level of function  5/22/2024 1452 by Tenzin Rain RN  Outcome: Adequate for Discharge     Problem: Chronic Conditions and Co-morbidities  Goal: Patient's chronic conditions and co-morbidity symptoms are monitored and maintained or improved  5/22/2024 1452 by Tenzin Rain RN  Outcome: Adequate for Discharge     Problem: Neurosensory - Adult  Goal: Achieves stable or improved neurological status  5/22/2024 1452 by Tenzin Rain RN  Outcome: Adequate for Discharge     Problem: Neurosensory - Adult  Goal: Absence of seizures  5/22/2024 1452 by Tenzin Rain RN  Outcome: Adequate for Discharge     Problem: Neurosensory - Adult  Goal: Remains free of injury related to seizures activity  5/22/2024 1452 by Tenzin Rain RN  Outcome: Adequate for Discharge     Problem: Neurosensory - Adult  Goal: Achieves maximal functionality and self care  5/22/2024 1452 by Tenzin Rain RN  Outcome: Adequate for Discharge     Problem: Infection - Adult  Goal: Absence of infection at discharge  5/22/2024 1452 by Tenzin Rain RN  Outcome: Adequate for Discharge     Problem:  Infection - Adult  Goal: Absence of infection during hospitalization  5/22/2024 1452 by Tenzin Rain RN  Outcome: Adequate for Discharge     Problem: Infection - Adult  Goal: Absence of fever/infection during anticipated neutropenic period  5/22/2024 1452 by Tenzin Rain RN  Outcome: Adequate for Discharge     Problem: Pain  Goal: Verbalizes/displays adequate comfort level or baseline comfort level  5/22/2024 1452 by Tenzin Rain RN  Outcome: Adequate for Discharge     Problem: Respiratory - Adult  Goal: Achieves optimal ventilation and oxygenation  5/22/2024 1452 by Tenzin Rain RN  Outcome: Adequate for Discharge     Problem: Cardiovascular - Adult  Goal: Maintains optimal cardiac output and hemodynamic stability  5/22/2024 1452 by Tenzin Rain RN  Outcome: Adequate for Discharge

## 2024-05-22 NOTE — PROGRESS NOTES
Subjective: Denies CP or dyspnea. Plans for DC.     VS: /61   Pulse 66   Temp 97.3 °F (36.3 °C) (Oral)   Resp 16   Ht 1.803 m (5' 11\")   Wt 117.9 kg (260 lb)   SpO2 96%   BMI 36.26 kg/m²     Wt:     I/O: In: 2644.9 [P.O.:734; I.V.:1110.8]  Out: -     Monitor:Atrial fibrillation with vrate 40's-60's    Meds: Scheduled Meds:   amLODIPine  5 mg Oral Daily    vancomycin  1,000 mg IntraVENous Q12H    sodium chloride flush  5-40 mL IntraVENous 2 times per day    sodium chloride flush  10 mL IntraVENous 2 times per day    cefTRIAXone (ROCEPHIN) IV  1,000 mg IntraVENous Q24H    vancomycin (VANCOCIN) intermittent dosing (placeholder)   Other RX Placeholder    apixaban  5 mg Oral BID    aspirin  81 mg Oral Daily    atorvastatin  80 mg Oral Daily    doxazosin  4 mg Oral Nightly    fenofibrate  160 mg Oral Daily    losartan  100 mg Oral Daily    memantine  5 mg Oral Daily    metFORMIN  1,000 mg Oral BID     omega-3 acid ethyl esters  1 capsule Oral Daily    QUEtiapine  25 mg Oral Nightly     Continuous Infusions:   sodium chloride      sodium chloride       PRN Meds:.sodium chloride flush, sodium chloride, ondansetron **OR** ondansetron, sodium chloride flush, sodium chloride, potassium chloride **OR** potassium alternative oral replacement **OR** potassium chloride, magnesium sulfate, magnesium hydroxide, acetaminophen **OR** acetaminophen, ipratropium 0.5 mg-albuterol 2.5 mg     Exam:General Appearance: Alert, NAD  Skin: warm, dry  Pulmonary/Chest: CTA  Cardiovascular: irregular, bradycardic, negative JVD  Extremities: mild left peripheral edema    Labs:     CBC with Differential:    Lab Results   Component Value Date/Time    WBC 6.4 05/22/2024 05:57 AM    RBC 4.19 05/22/2024 05:57 AM    HGB 11.6 05/22/2024 05:57 AM    HCT 36.3 05/22/2024 05:57 AM     05/22/2024 05:57 AM    MCV 86.6 05/22/2024 05:57 AM    MCH 27.7 05/22/2024 05:57 AM    MCHC 32.0 05/22/2024 05:57 AM    RDW 14.6 05/22/2024 05:57 AM

## 2024-05-22 NOTE — PROGRESS NOTES
Pt discharged to home in good condition with belongings  Discharge instructions given  Prescriptions sent to home pharmacy. Lizz called into [facility] on [address]  Pt denies having any further questions at this time  Locked up home medication(s)/personal items given to patient at discharge  Patient/family state they have everything they were admitted with.

## 2024-05-22 NOTE — PROGRESS NOTES
[facility]  Progress Note    Keith Oreilly  MRN: [id number]  Date: 5/22/2024  LOS: 2     Reason for F/U :   Altered mental status    Impression :   Right auricle and upper facial cellulitis  Altered mental status, likely related to above  Diabetes mellitus type 2  Coronary artery disease  Dementia    Recommendations:   The patient continues on IV antimicrobial therapy with ceftriaxone and vancomycin  The patient seems to be doing well clinically, states he feels better overall  Patient can be discharged from an infectious disease standpoint when okay with other services, the plan will be to discharge the patient on oral cefdinir and doxycycline    Infection Control Recommendations:   Universal precautions    Discharge Planning:   Estimated Length of IV antimicrobials: While hospitalized  Patient will need Midline Catheter Insertion/ PICC line Insertion: No  Patient will need: Home IV , Infusion Center,  SNF,  LTAC: Undetermined  Patient willneed outpatient wound care: No    Medical Decision making / Summary of Stay:   Keith Oreilly is a 83 y.o.-year-old male who was initially admitted on 5/19/2024.    Keith is seen and evaluated at bedside he is a very poor historian and the history is obtained from his wife Karlie who I called on the phone.     Keith has diabetes mellitus type 2, coronary artery disease, congestive heart failure, paroxysmal atrial fibrillation, hypertension, hyperlipidemia, morbid obesity, tachybradycardia syndrome, arthritis and cognitive impairment/dementia, obstructive sleep apnea on CPAP.      He was recently hospitalized here after an acute cerebral infarction/CVA and rhabdomyolysis related to a fall.  The patient was discharged to acute rehab and had made at home and the wife reports that he was doing well walking with a walker but starting on Saturday she noticed that he was disoriented he was not really listening to her he was trying to get out of the bed to go to the  erythema  Pulmonary:      Effort: Pulmonary effort is normal. No respiratory distress.   Musculoskeletal:         General: Normal range of motion.   Skin:     General: Skin is warm and dry.      Comments: Right facial/cheek redness that extends to the right ear and across the forehead   Neurological:      General: No focal deficit present.      Mental Status: He is alert.   Psychiatric:         Mood and Affect: Mood normal.         Behavior: Behavior normal.         Laboratory data:   I have independently reviewed the followinglabs:  CBC with Differential:   Recent Labs     05/19/24  2318 05/20/24  0637 05/21/24  0535 05/22/24  0557   WBC 15.6*   < > 7.5 6.4   HGB 13.9   < > 11.5* 11.6*   HCT 43.5   < > 37.0* 36.3*      < > 181 186   LYMPHOPCT 5*  --   --   --    MONOPCT 6  --   --   --    EOSPCT 0*  --   --   --     < > = values in this interval not displayed.     BMP:   Recent Labs     05/20/24  0637 05/21/24  0535    140   K 3.7 3.6*    107   CO2 24 26   BUN 17 17   CREATININE 0.9 0.8   MG  --  1.6     Hepatic Function Panel:   Recent Labs     05/20/24  0637 05/21/24  0535   BILITOT 0.8 0.4   ALKPHOS 47 43   ALT 22 26   AST 53* 47*         Lab Results   Component Value Date/Time    PROCAL 0.40 05/19/2024 11:18 PM    PROCAL 4.09 03/23/2024 01:31 PM     No results found for: \"CRP\"  No results found for: \"SEDRATE\"      No results found for: \"DDIMER\"  No results found for: \"FERRITIN\"  No results found for: \"LDH\"  No results found for: \"FIBRINOGEN\"    Results in Past 30 Days  Result Component Current Result Ref Range Previous Result Ref Range   SARS-CoV-2 RNA, RT PCR Not Detected (5/19/2024) Not Detected Not in Time Range      Lab Results   Component Value Date/Time    COVID19 Not Detected 05/19/2024 11:24 PM    COVID19 Not Detected 03/23/2024 10:30 AM    COVID19 Not Detected 11/28/2020 02:40 PM       No results for input(s): \"VANCOTROUGH\" in the last 72 hours.    Imaging Studies:   No new

## 2024-05-22 NOTE — DISCHARGE SUMMARY
[facility]  Hospitalist Discharge Summary        Patient: Keith Oreilly  YOB: 1940  MRN: [id number]   Acct: [id number]    Primary Care Physician: Chaparrita Barrios MD    Admit date  5/19/2024    Discharge date:  5/22/2024 2:42 PM    Chief Complaint on presentation :-  Fall    Discharge Assessment and Plan:-     84 yo M admitted for traumatic rhabdomyolysis, Facial cellulitis, AMS. Patient was seen by ID, Nephro and cardiology. Patient was treated with IV abx and switched to orals prior to discharge.     Altered mental status   In the setting of AD.   Resolved     Cellulitis  Facial  On IV abx  ID following   Monitor llabs     Atrial Flutter with tachybrady syndrome on anticoagulation  Rate controlled  On OAC - eliquis  Monitor hgb     Congestive heart failure/CAD s/p CABG x 4  Monitor I/O, UO, Low salt diet,   Diastolic dysfunction   Mild elevation of troponin.   Cardiology following.   No EKG changes.      Diabetes mellitus/hypertension/hyperlipidemia  Ac an Hs checks  ISS  Hypoglycemia protocol     АННА on CPAP  Recent lacunar infarct - continue home meds. Pt/ot ordered     Mild late onset Alzheimer's dementia with agitation  Diabetic polyneuropathy     Traumatic rhabdomyolysis  Continue IVF  Monitor lytes  Recheck CPK resolving  Fall precautions    Initial H and P and Hospital course:-  83-year-old male with past medical history of A-fib, CAD, CHF, diabetes, АННА on CPAP, Alzheimer's dementia, recent CVA, admitted to the hospital for complaints of altered mental status, weakness, fatigue. Patient is a very poor historian. There is no family present at bedside. Patient tells me that he fell. I suspect there is a evidence of that given that his CK levels are elevated. In the ER, there was concerns for pneumonia although I have low suspicion for that given that his workup is negative. Infectious disease, nephrology, cardiology were consulted. Upon my evaluation, patient does not give me enough  right internal capsule. Redemonstration of moderate chronic microvascular ischemic changes in the white matter of both cerebral hemispheres.        Follow-up scheduled after discharge :-    in the next few days with Chaparrita Barrios MD      Consultations during this hospital stay:-  [] NONE [] Cardiology  [x] Nephrology  [] Hemo onco   [] GI   [x] ID  [] Endocrine  [] Pulm    [] Neuro    [] Psych   [] Urology  [] ENT   [] G SURGERY   []Ortho    []CV surg    [] Palliative  [] Hospice [] Pain management   []    []TCU   [] PT/OT  OTHERS:-    Disposition: Home with Home care  Condition at Discharge: Stable    Time Spent:- 35 minutes    Electronically signed by Mohsin Mohammad Reza, MD on 5/22/2024 at 2:42 PM  Discharging Hospitalist

## 2024-05-22 NOTE — CARE COORDINATION
Social work:  Spoke to Vishal Webber/polo on the phone regarding dc plans (home v snf).   Patient did better in therapy today, and will be oral antibiotics, therefore plan will be home with continue services from [facility].   Eugene/CELENA updated.

## 2024-05-22 NOTE — PROGRESS NOTES
Reviewed discharge with rosaline's daughter, Vishal Ziegler, questions answered. Stated she will be here some time after 6pm to take patient home.

## 2024-05-22 NOTE — PROGRESS NOTES
Reason for Follow up: Rhabdomyolysis  Right auricular and upper facial cellulitis  Type 2 diabetes  Dementia  Hypertension  Hypokalemia    HISTORY:    Patient seen and evaluated discussed with RN patient has been feeling better he remains on ceftriaxone and vancomycin urine output not documented lab data shows improvement in renal function electrolytes show mild hypokalemia    Review Of Systems:   Constitutional: No fever, chills, lethargy, weakness or wt loss.  Cardiac:  No chest pain, dyspnea, orthopnea or PND.  Pulmonary:  No cough, phlegm or wheezing.  Abdomen:  No abdominal pain, nausea, vomiting or diarrhea.  :   No hematuria, pyuria, dysuria or flank pain.  Extremities:  No swelling or joint pains.      Review Of Systems:   Constitutional: No fever, chills, lethargy, weakness or wt loss.  HEENT:  No headache, nasal discharge or sore throat.  Cardiac:  No chest pain, dyspnea, orthopnea or PND.  Pulmonary:  No cough, phlegm or wheezing.  Abdomen:  No abdominal pain, nausea, vomiting or diarrhea.  Neuro:  No gross focal weakness, numbness, abnormal movements or seizure like activity.  Skin:   No rashes or itching.  :   No hematuria, pyuria, dysuria or flank pain.  Extremities:  No swelling or joint pains.  Endocrine: No polyuria, polydypsia, or thyroid problems.  Hematology:    No bleeding disorders, bruising or anemia.  All other ROS is negative.  Unable to obtain because he is intubated and sedated.     Scheduled Meds:   amLODIPine  5 mg Oral Daily    vancomycin  1,000 mg IntraVENous Q12H    sodium chloride flush  5-40 mL IntraVENous 2 times per day    sodium chloride flush  10 mL IntraVENous 2 times per day    cefTRIAXone (ROCEPHIN) IV  1,000 mg IntraVENous Q24H    vancomycin (VANCOCIN) intermittent dosing (placeholder)   Other RX Placeholder    apixaban  5 mg Oral BID    aspirin  81 mg Oral Daily    atorvastatin  80 mg Oral Daily    doxazosin  4 mg Oral Nightly    fenofibrate  160 mg Oral Daily     losartan  100 mg Oral Daily    memantine  5 mg Oral Daily    metFORMIN  1,000 mg Oral BID     omega-3 acid ethyl esters  1 capsule Oral Daily    QUEtiapine  25 mg Oral Nightly   Continuous Infusions:   sodium chloride      sodium chloride       PRN Meds:sodium chloride flush, sodium chloride, ondansetron **OR** ondansetron, sodium chloride flush, sodium chloride, potassium chloride **OR** potassium alternative oral replacement **OR** potassium chloride, magnesium sulfate, magnesium hydroxide, acetaminophen **OR** acetaminophen, ipratropium 0.5 mg-albuterol 2.5 mg    Allergies   Allergen Reactions    Lisinopril      Patient unsure of reaction       Physical Exam:  Blood pressure (!) 139/54, pulse 61, temperature 97.7 °F (36.5 °C), temperature source Oral, resp. rate 18, height 1.803 m (5' 11\"), weight 117.9 kg (260 lb), SpO2 97 %.  In: 2644.9 [P.O.:734; I.V.:1110.8]  Out: -   In: 2644.9   Out: -   CONSTITUTIONAL:  awake, alert, cooperative, no apparent distress, and appears stated age  General:  Awake, alert, not in distress. Appears to be stated age.  HEENT: Atraumatic, normocephalic. Anicteric sclera. Pink and moist oral mucosa. No carotid bruit. No JVD.  Chest: Bilateral air entry, clear to auscultation, no wheezing, rhonchi or rales.  Cardiovascular: RRR, S1S2, no murmur, rub or gallop. No lower extremity edema.    Abdomen: Soft, non tender to palpation. Active bowel sounds x 4 quadrants.  Musculoskeletal: Active ROM x 4 extremities. No cyanosis or clubbing.  Integumentary: Pink, warm and dry. Free from rash or lesions. Skin turgor normal.  CNS: Oriented to person, place and time. Speech clear. Face symmetrical. No tremor.     Data:  CBC:   Lab Results   Component Value Date    WBC 6.4 05/22/2024    HGB 11.6 (L) 05/22/2024    HCT 36.3 (L) 05/22/2024    MCV 86.6 05/22/2024     05/22/2024     BMP:    Lab Results   Component Value Date     05/22/2024    K 3.5 (L) 05/22/2024     (H) 05/22/2024

## 2024-05-22 NOTE — CARE COORDINATION
Kalpana from [facility] informed that patient is going home this evening. She will pull patient information from [employer].

## 2024-05-22 NOTE — PROGRESS NOTES
[facility]   Pharmacy Pharmacokinetic Monitoring Service - Vancomycin    Consulting Provider: Dian Yao MD   Indication: cellulitis of face  Target Concentration: Goal AUC/-600 mg*hr/L  Day of Therapy: 3  Additional Antimicrobials: ceftriaxone    Pertinent Laboratory Values:   Wt Readings from Last 1 Encounters:   05/19/24 117.9 kg (260 lb)     Temp Readings from Last 1 Encounters:   05/22/24 97.3 °F (36.3 °C) (Oral)     Estimated Creatinine Clearance: 93 mL/min (based on SCr of 0.8 mg/dL).  Recent Labs     05/21/24  0535 05/22/24  0557   CREATININE 0.8 0.8   BUN 17 15   WBC 7.5 6.4     Procalcitonin: 0.4 (5/19/24)    Pertinent Cultures:    MRSA Nasal Swab: N/A. Non-respiratory infection.    Recent vancomycin administrations                     vancomycin 1000 mg IVPB in 250 mL NS addavial (mg) 1,000 mg New Bag 05/22/24 0426     1,000 mg New Bag 05/21/24 1742    vancomycin (VANCOCIN) 750 mg in sodium chloride 0.9 % 250 mL IVPB (mg) 750 mg New Bag 05/21/24 0517     750 mg New Bag 05/20/24 1948    vancomycin (VANCOCIN) 2500 mg in sodium chloride 0.9 % 500 mL IVPB (mg) 2,500 mg New Bag 05/20/24 1359        Assessment:  Date/Time Current Dose Concentration Timing of Concentration (h) AUC   5/22/24 @ 1153 1000mg Q12H 16.1 7.5 453   Note: Serum concentrations collected for AUC dosing may appear elevated if collected in close proximity to the dose administered, this is not necessarily an indication of toxicity    Plan:  Current dosing regimen is therapeutic  Continue current dose 1000mg Q12H  Pharmacy will continue to monitor patient and adjust therapy as indicated    Thank you for the consult,  Catherine Gomez [facility]  5/22/2024 12:34 PM

## 2024-05-22 NOTE — PLAN OF CARE
No acute events overnight.   Rocephin and Vanco for cellulitis on face.   He remains A/O x4, on room air and controlled afib on monitor taking eliquis. Pt HR drops to high 30's-low 40's while sleeping, cardiology aware. No interventions at this time.  No blood sugar coverage needed.   Pt strength seems to be improving, he is able to get up standby assist with a walk with little assistance.   Vitals stable and pt denies pain.   Plan is for pt to potentially go to [facility] once medically stable.   All needs met at this time, safety maintained.    Problem: Discharge Planning  Goal: Discharge to home or other facility with appropriate resources  5/22/2024 0145 by Tala Lewis, RN  Outcome: Progressing  Flowsheets (Taken 5/21/2024 2030)  Discharge to home or other facility with appropriate resources: Identify barriers to discharge with patient and caregiver     Problem: Safety - Adult  Goal: Free from fall injury  5/22/2024 0145 by Tala Lewis, RN  Outcome: Progressing    Problem: ABCDS Injury Assessment  Goal: Absence of physical injury  5/22/2024 0145 by Tala Lewis, RN  Outcome: Progressing     Problem: Skin/Tissue Integrity - Adult  Goal: Skin integrity remains intact  5/22/2024 0145 by Tala Lewis, RN  Outcome: Progressing  Flowsheets (Taken 5/21/2024 2030)  Skin Integrity Remains Intact: Monitor for areas of redness and/or skin breakdown    Problem: Musculoskeletal - Adult  Goal: Return mobility to safest level of function  5/22/2024 0145 by Tala Lewis, RN  Outcome: Progressing  Flowsheets (Taken 5/21/2024 2030)  Return Mobility to Safest Level of Function: Assess patient stability and activity tolerance for standing, transferring and ambulating with or without assistive devices  Goal: Return ADL status to a safe level of function  5/22/2024 0145 by Tala Lewis, RN  Outcome: Progressing  Flowsheets (Taken 5/21/2024 2030)  Return ADL Status to a Safe Level of Function: Administer medication as ordered

## 2024-05-23 LAB — GLUCOSE BLD-MCNC: 176 MG/DL (ref 75–110)

## 2024-05-25 LAB
MICROORGANISM SPEC CULT: NORMAL
MICROORGANISM SPEC CULT: NORMAL
SERVICE CMNT-IMP: NORMAL
SERVICE CMNT-IMP: NORMAL
SPECIMEN DESCRIPTION: NORMAL
SPECIMEN DESCRIPTION: NORMAL

## 2024-07-22 ENCOUNTER — HOSPITAL ENCOUNTER (INPATIENT)
Age: 84
LOS: 3 days | Discharge: HOME | DRG: 243 | End: 2024-07-25
Attending: EMERGENCY MEDICINE | Admitting: HOSPITALIST
Payer: MEDICARE

## 2024-07-22 ENCOUNTER — APPOINTMENT (OUTPATIENT)
Dept: CT IMAGING | Age: 84
DRG: 243 | End: 2024-07-22
Payer: MEDICARE

## 2024-07-22 ENCOUNTER — APPOINTMENT (OUTPATIENT)
Dept: GENERAL RADIOLOGY | Age: 84
DRG: 243 | End: 2024-07-22
Payer: MEDICARE
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
## 2024-07-22 DIAGNOSIS — I48.19 PERSISTENT ATRIAL FIBRILLATION (HCC): ICD-10-CM

## 2024-07-22 DIAGNOSIS — R00.1 BRADYCARDIA: Primary | ICD-10-CM

## 2024-07-22 DIAGNOSIS — R53.1 GENERAL WEAKNESS: ICD-10-CM

## 2024-07-22 LAB
ALBUMIN SERPL-MCNC: 4.1 G/DL (ref 3.5–5.2)
ALP SERPL-CCNC: 38 U/L (ref 40–129)
ALT SERPL-CCNC: 13 U/L (ref 5–41)
ANION GAP SERPL CALCULATED.3IONS-SCNC: 8 MMOL/L (ref 9–17)
AST SERPL-CCNC: 18 U/L
BASOPHILS # BLD: 0.06 K/UL (ref 0–0.2)
BASOPHILS NFR BLD: 1 % (ref 0–2)
BILIRUB SERPL-MCNC: 0.4 MG/DL (ref 0.3–1.2)
BILIRUB UR QL STRIP: NEGATIVE
BUN SERPL-MCNC: 26 MG/DL (ref 8–23)
BUN/CREAT SERPL: 24 (ref 9–20)
CALCIUM SERPL-MCNC: 9.2 MG/DL (ref 8.6–10.4)
CHLORIDE SERPL-SCNC: 105 MMOL/L (ref 98–107)
CLARITY UR: CLEAR
CO2 SERPL-SCNC: 26 MMOL/L (ref 20–31)
COLOR UR: YELLOW
CREAT SERPL-MCNC: 1.1 MG/DL (ref 0.7–1.2)
EKG ATRIAL RATE: 29 BPM
EKG Q-T INTERVAL: 546 MS
EKG QRS DURATION: 166 MS
EKG QTC CALCULATION (BAZETT): 439 MS
EKG R AXIS: 88 DEGREES
EKG T AXIS: -41 DEGREES
EKG VENTRICULAR RATE: 39 BPM
EOSINOPHIL # BLD: 0.12 K/UL (ref 0–0.44)
EOSINOPHILS RELATIVE PERCENT: 2 % (ref 1–4)
EPI CELLS #/AREA URNS HPF: NORMAL /HPF (ref 0–5)
ERYTHROCYTE [DISTWIDTH] IN BLOOD BY AUTOMATED COUNT: 14.6 % (ref 11.8–14.4)
GFR, ESTIMATED: 67 ML/MIN/1.73M2
GLUCOSE BLD-MCNC: 117 MG/DL (ref 75–110)
GLUCOSE BLD-MCNC: 168 MG/DL (ref 75–110)
GLUCOSE BLD-MCNC: 93 MG/DL (ref 75–110)
GLUCOSE BLD-MCNC: 93 MG/DL (ref 75–110)
GLUCOSE SERPL-MCNC: 95 MG/DL (ref 70–99)
GLUCOSE UR STRIP-MCNC: NEGATIVE MG/DL
HCT VFR BLD AUTO: 43.8 % (ref 40.7–50.3)
HGB BLD-MCNC: 14 G/DL (ref 13–17)
HGB UR QL STRIP.AUTO: NEGATIVE
IMM GRANULOCYTES # BLD AUTO: 0.03 K/UL (ref 0–0.3)
IMM GRANULOCYTES NFR BLD: 0 %
INR PPP: 1.1
KETONES UR STRIP-MCNC: NEGATIVE MG/DL
LEUKOCYTE ESTERASE UR QL STRIP: NEGATIVE
LIPASE SERPL-CCNC: 59 U/L (ref 13–60)
LYMPHOCYTES NFR BLD: 1.78 K/UL (ref 1.1–3.7)
LYMPHOCYTES RELATIVE PERCENT: 24 % (ref 24–43)
MAGNESIUM SERPL-MCNC: 1.8 MG/DL (ref 1.6–2.6)
MCH RBC QN AUTO: 27.6 PG (ref 25.2–33.5)
MCHC RBC AUTO-ENTMCNC: 32 G/DL (ref 28.4–34.8)
MCV RBC AUTO: 86.2 FL (ref 82.6–102.9)
MONOCYTES NFR BLD: 0.5 K/UL (ref 0.1–1.2)
MONOCYTES NFR BLD: 7 % (ref 3–12)
NEUTROPHILS NFR BLD: 66 % (ref 36–65)
NEUTS SEG NFR BLD: 5.06 K/UL (ref 1.5–8.1)
NITRITE UR QL STRIP: NEGATIVE
NRBC BLD-RTO: 0 PER 100 WBC
PH UR STRIP: 6 [PH] (ref 5–8)
PLATELET # BLD AUTO: 226 K/UL (ref 138–453)
PMV BLD AUTO: 9.6 FL (ref 8.1–13.5)
POTASSIUM SERPL-SCNC: 3.8 MMOL/L (ref 3.7–5.3)
PROT SERPL-MCNC: 6.7 G/DL (ref 6.4–8.3)
PROT UR STRIP-MCNC: ABNORMAL MG/DL
PROTHROMBIN TIME: 14 SEC (ref 11.5–14.2)
RBC # BLD AUTO: 5.08 M/UL (ref 4.21–5.77)
RBC # BLD: ABNORMAL 10*6/UL
RBC #/AREA URNS HPF: NORMAL /HPF (ref 0–2)
SODIUM SERPL-SCNC: 139 MMOL/L (ref 135–144)
SP GR UR STRIP: 1.02 (ref 1–1.03)
TROPONIN I SERPL HS-MCNC: 24 NG/L (ref 0–22)
TROPONIN I SERPL HS-MCNC: 27 NG/L (ref 0–22)
UROBILINOGEN UR STRIP-ACNC: NORMAL EU/DL (ref 0–1)
WBC #/AREA URNS HPF: NORMAL /HPF (ref 0–5)
WBC OTHER # BLD: 7.6 K/UL (ref 3.5–11.3)

## 2024-07-22 PROCEDURE — 85610 PROTHROMBIN TIME: CPT

## 2024-07-22 PROCEDURE — 70450 CT HEAD/BRAIN W/O DYE: CPT

## 2024-07-22 PROCEDURE — 83690 ASSAY OF LIPASE: CPT

## 2024-07-22 PROCEDURE — 6370000000 HC RX 637 (ALT 250 FOR IP): Performed by: HOSPITALIST

## 2024-07-22 PROCEDURE — 99285 EMERGENCY DEPT VISIT HI MDM: CPT

## 2024-07-22 PROCEDURE — 2580000003 HC RX 258: Performed by: PHYSICIAN ASSISTANT

## 2024-07-22 PROCEDURE — 83735 ASSAY OF MAGNESIUM: CPT

## 2024-07-22 PROCEDURE — 85025 COMPLETE CBC W/AUTO DIFF WBC: CPT

## 2024-07-22 PROCEDURE — 94761 N-INVAS EAR/PLS OXIMETRY MLT: CPT

## 2024-07-22 PROCEDURE — 2060000000 HC ICU INTERMEDIATE R&B

## 2024-07-22 PROCEDURE — 2580000003 HC RX 258: Performed by: HOSPITALIST

## 2024-07-22 PROCEDURE — 80053 COMPREHEN METABOLIC PANEL: CPT

## 2024-07-22 PROCEDURE — 84484 ASSAY OF TROPONIN QUANT: CPT

## 2024-07-22 PROCEDURE — 81001 URINALYSIS AUTO W/SCOPE: CPT

## 2024-07-22 PROCEDURE — 93005 ELECTROCARDIOGRAM TRACING: CPT | Performed by: PHYSICIAN ASSISTANT

## 2024-07-22 PROCEDURE — 36415 COLL VENOUS BLD VENIPUNCTURE: CPT

## 2024-07-22 PROCEDURE — 82947 ASSAY GLUCOSE BLOOD QUANT: CPT

## 2024-07-22 PROCEDURE — 73030 X-RAY EXAM OF SHOULDER: CPT

## 2024-07-22 PROCEDURE — 71046 X-RAY EXAM CHEST 2 VIEWS: CPT

## 2024-07-22 RX ORDER — GLIPIZIDE 10 MG/1
10 TABLET ORAL
Status: DISCONTINUED | OUTPATIENT
Start: 2024-07-23 | End: 2024-07-25 | Stop reason: HOSPADM

## 2024-07-22 RX ORDER — SENNOSIDES A AND B 8.6 MG/1
1 TABLET, FILM COATED ORAL 2 TIMES DAILY PRN
Status: DISCONTINUED | OUTPATIENT
Start: 2024-07-22 | End: 2024-07-25 | Stop reason: HOSPADM

## 2024-07-22 RX ORDER — ATORVASTATIN CALCIUM 80 MG/1
80 TABLET, FILM COATED ORAL DAILY
Status: DISCONTINUED | OUTPATIENT
Start: 2024-07-22 | End: 2024-07-25 | Stop reason: HOSPADM

## 2024-07-22 RX ORDER — POTASSIUM CHLORIDE 7.45 MG/ML
10 INJECTION INTRAVENOUS PRN
Status: DISCONTINUED | OUTPATIENT
Start: 2024-07-22 | End: 2024-07-25 | Stop reason: HOSPADM

## 2024-07-22 RX ORDER — ONDANSETRON 4 MG/1
4 TABLET, ORALLY DISINTEGRATING ORAL EVERY 8 HOURS PRN
Status: DISCONTINUED | OUTPATIENT
Start: 2024-07-22 | End: 2024-07-25 | Stop reason: HOSPADM

## 2024-07-22 RX ORDER — ACETAMINOPHEN 325 MG/1
650 TABLET ORAL EVERY 6 HOURS PRN
Status: DISCONTINUED | OUTPATIENT
Start: 2024-07-22 | End: 2024-07-25 | Stop reason: HOSPADM

## 2024-07-22 RX ORDER — 0.9 % SODIUM CHLORIDE 0.9 %
1000 INTRAVENOUS SOLUTION INTRAVENOUS ONCE
Status: COMPLETED | OUTPATIENT
Start: 2024-07-22 | End: 2024-07-22

## 2024-07-22 RX ORDER — ACETAMINOPHEN 650 MG/1
650 SUPPOSITORY RECTAL EVERY 6 HOURS PRN
Status: DISCONTINUED | OUTPATIENT
Start: 2024-07-22 | End: 2024-07-25 | Stop reason: HOSPADM

## 2024-07-22 RX ORDER — INSULIN LISPRO 100 [IU]/ML
0-8 INJECTION, SOLUTION INTRAVENOUS; SUBCUTANEOUS
Status: DISCONTINUED | OUTPATIENT
Start: 2024-07-22 | End: 2024-07-25 | Stop reason: HOSPADM

## 2024-07-22 RX ORDER — HYDRALAZINE HYDROCHLORIDE 20 MG/ML
10 INJECTION INTRAMUSCULAR; INTRAVENOUS EVERY 6 HOURS PRN
Status: DISCONTINUED | OUTPATIENT
Start: 2024-07-22 | End: 2024-07-25 | Stop reason: HOSPADM

## 2024-07-22 RX ORDER — LOSARTAN POTASSIUM 100 MG/1
100 TABLET ORAL DAILY
Status: DISCONTINUED | OUTPATIENT
Start: 2024-07-22 | End: 2024-07-23

## 2024-07-22 RX ORDER — DEXTROSE MONOHYDRATE 100 MG/ML
INJECTION, SOLUTION INTRAVENOUS CONTINUOUS PRN
Status: DISCONTINUED | OUTPATIENT
Start: 2024-07-22 | End: 2024-07-25 | Stop reason: HOSPADM

## 2024-07-22 RX ORDER — SODIUM CHLORIDE 9 MG/ML
INJECTION, SOLUTION INTRAVENOUS PRN
Status: DISCONTINUED | OUTPATIENT
Start: 2024-07-22 | End: 2024-07-25 | Stop reason: HOSPADM

## 2024-07-22 RX ORDER — ONDANSETRON 2 MG/ML
4 INJECTION INTRAMUSCULAR; INTRAVENOUS EVERY 6 HOURS PRN
Status: DISCONTINUED | OUTPATIENT
Start: 2024-07-22 | End: 2024-07-25 | Stop reason: HOSPADM

## 2024-07-22 RX ORDER — DOXAZOSIN MESYLATE 4 MG/1
4 TABLET ORAL NIGHTLY
Status: DISCONTINUED | OUTPATIENT
Start: 2024-07-22 | End: 2024-07-23

## 2024-07-22 RX ORDER — QUETIAPINE FUMARATE 25 MG/1
25 TABLET, FILM COATED ORAL NIGHTLY
Status: DISCONTINUED | OUTPATIENT
Start: 2024-07-22 | End: 2024-07-25 | Stop reason: HOSPADM

## 2024-07-22 RX ORDER — MAGNESIUM SULFATE 1 G/100ML
1000 INJECTION INTRAVENOUS PRN
Status: DISCONTINUED | OUTPATIENT
Start: 2024-07-22 | End: 2024-07-25 | Stop reason: HOSPADM

## 2024-07-22 RX ORDER — SODIUM CHLORIDE 0.9 % (FLUSH) 0.9 %
10 SYRINGE (ML) INJECTION EVERY 12 HOURS SCHEDULED
Status: DISCONTINUED | OUTPATIENT
Start: 2024-07-22 | End: 2024-07-25 | Stop reason: HOSPADM

## 2024-07-22 RX ORDER — SODIUM CHLORIDE 0.9 % (FLUSH) 0.9 %
10 SYRINGE (ML) INJECTION PRN
Status: DISCONTINUED | OUTPATIENT
Start: 2024-07-22 | End: 2024-07-25 | Stop reason: HOSPADM

## 2024-07-22 RX ORDER — POTASSIUM CHLORIDE 20 MEQ/1
40 TABLET, EXTENDED RELEASE ORAL PRN
Status: DISCONTINUED | OUTPATIENT
Start: 2024-07-22 | End: 2024-07-25 | Stop reason: HOSPADM

## 2024-07-22 RX ORDER — MEMANTINE HYDROCHLORIDE 10 MG/1
5 TABLET ORAL DAILY
Status: DISCONTINUED | OUTPATIENT
Start: 2024-07-22 | End: 2024-07-25 | Stop reason: HOSPADM

## 2024-07-22 RX ORDER — FUROSEMIDE 20 MG/1
20 TABLET ORAL
Status: DISCONTINUED | OUTPATIENT
Start: 2024-07-22 | End: 2024-07-25 | Stop reason: HOSPADM

## 2024-07-22 RX ORDER — FENOFIBRATE 160 MG/1
160 TABLET ORAL DAILY
Status: DISCONTINUED | OUTPATIENT
Start: 2024-07-22 | End: 2024-07-23

## 2024-07-22 RX ORDER — INSULIN LISPRO 100 [IU]/ML
0-4 INJECTION, SOLUTION INTRAVENOUS; SUBCUTANEOUS NIGHTLY
Status: DISCONTINUED | OUTPATIENT
Start: 2024-07-22 | End: 2024-07-25 | Stop reason: HOSPADM

## 2024-07-22 RX ADMIN — DOXAZOSIN 4 MG: 4 TABLET ORAL at 21:23

## 2024-07-22 RX ADMIN — ACETAMINOPHEN 650 MG: 325 TABLET ORAL at 16:52

## 2024-07-22 RX ADMIN — LOSARTAN POTASSIUM 100 MG: 100 TABLET, FILM COATED ORAL at 13:34

## 2024-07-22 RX ADMIN — MEMANTINE 5 MG: 10 TABLET ORAL at 13:34

## 2024-07-22 RX ADMIN — FENOFIBRATE 160 MG: 160 TABLET ORAL at 13:34

## 2024-07-22 RX ADMIN — QUETIAPINE FUMARATE 25 MG: 25 TABLET ORAL at 21:23

## 2024-07-22 RX ADMIN — SODIUM CHLORIDE 1000 ML: 9 INJECTION, SOLUTION INTRAVENOUS at 09:31

## 2024-07-22 RX ADMIN — FUROSEMIDE 20 MG: 20 TABLET ORAL at 16:52

## 2024-07-22 RX ADMIN — SODIUM CHLORIDE, PRESERVATIVE FREE 10 ML: 5 INJECTION INTRAVENOUS at 21:23

## 2024-07-22 ASSESSMENT — PAIN DESCRIPTION - PAIN TYPE: TYPE: ACUTE PAIN

## 2024-07-22 ASSESSMENT — LIFESTYLE VARIABLES
HOW MANY STANDARD DRINKS CONTAINING ALCOHOL DO YOU HAVE ON A TYPICAL DAY: 1 OR 2
HOW OFTEN DO YOU HAVE A DRINK CONTAINING ALCOHOL: MONTHLY OR LESS

## 2024-07-22 ASSESSMENT — PAIN DESCRIPTION - DESCRIPTORS: DESCRIPTORS: ACHING;SORE;TENDER

## 2024-07-22 ASSESSMENT — PAIN - FUNCTIONAL ASSESSMENT
PAIN_FUNCTIONAL_ASSESSMENT: 0-10
PAIN_FUNCTIONAL_ASSESSMENT: PREVENTS OR INTERFERES SOME ACTIVE ACTIVITIES AND ADLS

## 2024-07-22 ASSESSMENT — PAIN SCALES - GENERAL
PAINLEVEL_OUTOF10: 3
PAINLEVEL_OUTOF10: 3

## 2024-07-22 ASSESSMENT — PAIN DESCRIPTION - ORIENTATION: ORIENTATION: LEFT

## 2024-07-22 ASSESSMENT — PAIN DESCRIPTION - LOCATION: LOCATION: ARM

## 2024-07-22 NOTE — ED PROVIDER NOTES
the following components:    Anion Gap 8 (*)     BUN 26 (*)     BUN/Creatinine Ratio 24 (*)     Alkaline Phosphatase 38 (*)     All other components within normal limits   LIPASE   MAGNESIUM   PROTIME-INR   TROPONIN   URINALYSIS   POC GLUCOSE FINGERSTICK     CONSULTS:  None  FINAL IMPRESSION    No diagnosis found.        PASTMEDICAL HISTORY     Past Medical History:   Diagnosis Date    Arthritis     Atrial fibrillation (HCC)     CAD (coronary artery disease)     no stents    CHF (congestive heart failure) (HCC)     patient denies    Diabetes (HCC)     type 2    Diverticulosis     Hyperlipidemia     Obesity     АННА on CPAP     nightly    Poor historian     Renal stone     passed     SURGICAL HISTORY       Past Surgical History:   Procedure Laterality Date    APPENDECTOMY      BUNIONECTOMY Right 12/3/2020    RIGHT 5TH METHEAD CONDYLECTOMY performed by Hugh Patel MD at [facility] OR    CARDIAC CATHETERIZATION  09/02/2020    : LEFT HEART CATH / LV   CORS    CARDIAC SURGERY      quad bypass    COLONOSCOPY      JOINT REPLACEMENT      patient denies    OTHER SURGICAL HISTORY      tumor removed from rib     CURRENT MEDICATIONS       Previous Medications    AMLODIPINE (NORVASC) 5 MG TABLET    Take 1 tablet by mouth daily    APIXABAN (ELIQUIS) 5 MG TABS TABLET    Take 1 tablet by mouth 2 times daily    ASPIRIN 81 MG TABLET    Take 1 tablet by mouth daily    ATORVASTATIN (LIPITOR) 80 MG TABLET    Take 1 tablet by mouth daily    B COMPLEX VITAMINS (VITAMIN B COMPLEX PO)    Take 1 tablet by mouth daily    CRANBERRY 1000 MG CAPS    Take 1 capsule by mouth daily    DOXAZOSIN (CARDURA) 4 MG TABLET    Take 1 tablet by mouth nightly    DULAGLUTIDE (TRULICITY) 1.5 MG/0.5ML SC INJECTION    Inject 0.5 mLs into the skin once a week On Saturdays    FENOFIBRATE (TRICOR) 145 MG TABLET    Take 1 tablet by mouth daily    FUROSEMIDE (LASIX) 20 MG TABLET    Take 1 tablet by mouth three times a week On Monday, Wednesday, Friday    GLIMEPIRIDE  (AMARYL) 4 MG TABLET    Take 2 tablets by mouth every morning (before breakfast)    LOSARTAN (COZAAR) 100 MG TABLET    Take 1 tablet by mouth daily    MELATONIN 3 MG TABS TABLET    Take 1 tablet by mouth nightly as needed    MEMANTINE (NAMENDA) 5 MG TABLET    Take 1 tablet by mouth daily    METFORMIN (GLUCOPHAGE) 500 MG TABLET    Take 2 tablets by mouth 2 times daily (with meals)    MOMETASONE (ELOCON) 0.1 % CREAM    Apply topically daily as needed (for face)    MULTIPLE VITAMINS-MINERALS (THERAPEUTIC MULTIVITAMIN-MINERALS) TABLET    Take 1 tablet by mouth daily    OMEGA-3 FATTY ACIDS (FISH OIL) 1000 MG CAPSULE    Take 1 capsule by mouth daily    QUETIAPINE (SEROQUEL) 25 MG TABLET    Take 1 tablet by mouth nightly    VITAMIN D, CHOLECALCIFEROL, 50 MCG (2000) CAPS    Take 1 capsule by mouth daily     ALLERGIES     is allergic to lisinopril.  FAMILY HISTORY     has no family status information on file.      SOCIAL HISTORY       Social History     Tobacco Use    Smoking status: Former     Current packs/day: 0.00     Types: Cigarettes     Quit date: 1975     Years since quittin.5    Smokeless tobacco: Never   Vaping Use    Vaping Use: Never used   Substance Use Topics    Alcohol use: Yes     Alcohol/week: 2.0 standard drinks of alcohol     Types: 1 Glasses of wine, 1 Cans of beer per week     Comment: rare    Drug use: No          Miranda Da Silva MD  Attending Emergency Physician          Miranda Da Silva MD  [phone]

## 2024-07-22 NOTE — CONSULTS
Reason for consult: Bradycardia/Falls    History of Present Illness  Mr. Oreilly is an 85 yo patient known to our practice for CAD, mixed hyperlipidemia, HTN, chronic atrial fibrillation/flutter, and tachy/waldo syndrome who presented to the ED with falls and dizziness. He denies syncopal episode. He was found to be bradycardic. He denies chest pain, dyspnea, orthopnea, PND, or palpitations. He has had some edema in his left leg.      Patient History  Personal History:  Reviewed UTD  Comment:  - Coronary artery disease, status post CABG x4 done 9/2/09 with LIMA to the LAD, SVG to OM2, SVG to OM3, and SVG to the distal RCA. Cardiac catheterization done 9/2/2020 revealed the LMCA to have 60-70% distal stenosis, LAD had mild irregularities 20-30%, Diag1 had 80% ostial stenosis, Circ had 99% proximal stenosis compromising the large AV groove branch, OM1 was occluded, OM2 was occluded, OM3 was occluded, RCA was occluded proximally, Ramus had mild irregularities 30-40%, LIMA to the LAD was patent with compromised flow due to thoracic branch being intact,  - Hypercholesterolemia and hypertriglyceridemia  - 24 hour Holter monitor 5/12-1/13/23 revealed predominant rhythm was atrial fibrillation/flutter, minimum HR: 28 bpm, Average HR: 54 bpm, Maximum HR: 97 bpm, total VEs: 6 times (<0.01 %), no secondary arrhythmia detected  -tachy/waldo syndrome  - Hypertension  - atrial fibrillation/flutter  - CHF  - diverticulosis  - altered mental status  - cellulitis  - Decreased testosterone  - Hyperglycemic  - Sleep apnea, uses CPAP  - asthma  - Restless leg syndrome  - T & A  - appendectomy  - 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  - Left total knee replacement  - carpal tunnel release  - Normal LV size and systolic function with an EF of 60-65%, mildly dilated LA, aortic valve shows a mildly calcified cusp, trace MR, trace TR noted on echo done 3/25/24  Social History:  Reviewed UTD  Family History:  Reviewed UTD      Allergies  LISINOPRIL

## 2024-07-22 NOTE — ED NOTES
Tj Pedraza bedside updated on decision to admit to continue cardiac monitoring R/T sinusbradycardia

## 2024-07-22 NOTE — PLAN OF CARE
Problem: Safety - Adult  Goal: Free from fall injury  Outcome: Progressing     Problem: Chronic Conditions and Co-morbidities  Goal: Patient's chronic conditions and co-morbidity symptoms are monitored and maintained or improved  Outcome: Progressing  Flowsheets (Taken 7/22/2024 0945 by Akua Gan, RN)  Care Plan - Patient's Chronic Conditions and Co-Morbidity Symptoms are Monitored and Maintained or Improved:   Monitor and assess patient's chronic conditions and comorbid symptoms for stability, deterioration, or improvement   Collaborate with multidisciplinary team to address chronic and comorbid conditions and prevent exacerbation or deterioration   Update acute care plan with appropriate goals if chronic or comorbid symptoms are exacerbated and prevent overall improvement and discharge     Problem: Discharge Planning  Goal: Discharge to home or other facility with appropriate resources  Outcome: Progressing  Flowsheets (Taken 7/22/2024 0945 by Akua Gan, RN)  Discharge to home or other facility with appropriate resources:   Identify barriers to discharge with patient and caregiver   Arrange for needed discharge resources and transportation as appropriate   Identify discharge learning needs (meds, wound care, etc)     Problem: Pain  Goal: Verbalizes/displays adequate comfort level or baseline comfort level  Outcome: Progressing     Problem: Skin/Tissue Integrity  Goal: Absence of new skin breakdown  Description: 1.  Monitor for areas of redness and/or skin breakdown  2.  Assess vascular access sites hourly  3.  Every 4-6 hours minimum:  Change oxygen saturation probe site  4.  Every 4-6 hours:  If on nasal continuous positive airway pressure, respiratory therapy assess nares and determine need for appliance change or resting period.  Outcome: Progressing     Problem: ABCDS Injury Assessment  Goal: Absence of physical injury  Outcome: Progressing

## 2024-07-22 NOTE — H&P (VIEW-ONLY)
Reason for consult: Bradycardia/Falls    History of Present Illness  Mr. Oreilly is an 85 yo patient known to our practice for CAD, mixed hyperlipidemia, HTN, chronic atrial fibrillation/flutter, and tachy/waldo syndrome who presented to the ED with falls and dizziness. He denies syncopal episode. He was found to be bradycardic. He denies chest pain, dyspnea, orthopnea, PND, or palpitations. He has had some edema in his left leg.      Patient History  Personal History:  Reviewed UTD  Comment:  - Coronary artery disease, status post CABG x4 done 9/2/09 with LIMA to the LAD, SVG to OM2, SVG to OM3, and SVG to the distal RCA. Cardiac catheterization done 9/2/2020 revealed the LMCA to have 60-70% distal stenosis, LAD had mild irregularities 20-30%, Diag1 had 80% ostial stenosis, Circ had 99% proximal stenosis compromising the large AV groove branch, OM1 was occluded, OM2 was occluded, OM3 was occluded, RCA was occluded proximally, Ramus had mild irregularities 30-40%, LIMA to the LAD was patent with compromised flow due to thoracic branch being intact,  - Hypercholesterolemia and hypertriglyceridemia  - 24 hour Holter monitor 5/12-1/13/23 revealed predominant rhythm was atrial fibrillation/flutter, minimum HR: 28 bpm, Average HR: 54 bpm, Maximum HR: 97 bpm, total VEs: 6 times (<0.01 %), no secondary arrhythmia detected  -tachy/waldo syndrome  - Hypertension  - atrial fibrillation/flutter  - CHF  - diverticulosis  - altered mental status  - cellulitis  - Decreased testosterone  - Hyperglycemic  - Sleep apnea, uses CPAP  - asthma  - Restless leg syndrome  - T & A  - appendectomy  - 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  - Left total knee replacement  - carpal tunnel release  - Normal LV size and systolic function with an EF of 60-65%, mildly dilated LA, aortic valve shows a mildly calcified cusp, trace MR, trace TR noted on echo done 3/25/24  Social History:  Reviewed UTD  Family History:  Reviewed UTD      Allergies  LISINOPRIL     or Thursday as patient's last dose of Eliquis was Sunday evening. Hold Eliquis.      4. History of CHF  -Chronic LV diastolic dysfunction. Compensated.      5. Mixed hyperlipidemia  -On Statin, Fenofibrate, and fish oil.            Will discuss with Dr. Garzon    Discussed with Dr. Garzon. Plans for PPM Wed afternoon with Dr. Banda.

## 2024-07-22 NOTE — PROGRESS NOTES
Per cardiology pacemaker Wednesday afternoon with Dr. Banda. Hold eliquis.     Okay with bradycardia above 30, call if HR sustains below 30.     Also, please notify when patient symptomatic with low HR

## 2024-07-22 NOTE — PROGRESS NOTES
Patient admitted to CV room 2033. HX dementia, close to nurses station. Patient admitted for bradycardia. Symptomatic at home with syncope and falls. On admission to floor HR anywhere from 30s-40s. Patient able to ambulate to bed with walker.     Daughter at bedside and helping with admission data base.     Patient and daughter given plan of care and are in agreement. Educated on call light and safety instructions. Verbalize understanding.    Bed Alarm set and working. Call light within reach

## 2024-07-22 NOTE — ED NOTES
Writer able to reach spouse Lawanda she confirmed home medications and verified Dr. Garzon as cardiology. Pt ate 1 piece of toast with jelly and a Verners soda.  Both patient and wife deny any falls that resulted in pt hitting head. He might have hit his head in the springtime when wife found him on the floor.

## 2024-07-22 NOTE — ED NOTES
While speaking with wife she stated \" pt has been c/o about a \"lump\" on his tailbone which he has not been seen for.

## 2024-07-22 NOTE — ED NOTES
Writer unable to reach spouse via mobile / home phone. Writer able to reach daughter Isabelle confirmed medications with her and Cardiology Dr. Plunkett.

## 2024-07-22 NOTE — H&P
History & Physical  [facility]    Adult Hospitalist      Name: Keith Oreilly  MRN: [id number]     Acct: [id number]  Room: 2033/2033-01    Admit Date: 7/22/2024  9:08 AM  PCP: Chaparrita Barrios MD    Primary Problem  Principal Problem:    Bradycardia  Resolved Problems:    * No resolved hospital problems. *        Assesment/ plan:     Patient is admitted to intermediate level        Tachybradycardia syndrome  Patient presented with bradycardia  Hold AV ceci blocking agents  Monitor heart rate  Cardiology consult, possible plan for pacemaker placement        Elevated troponin  Trend Trop:  Cardiology consult          Hypertensive heart disease  Monitor blood pressure  Hydralazine as needed  Continue antihypertensives as below        Coronary artery disease  Stable        Diabetes type 2  Monitor blood glucose  SSI        Chronic diastolic CHF  Euvolemic        Obstructive sleep apnea  On CPAP        Alzheimer's dementia  On Namenda and Seroquel        History of recent lacunar infarct  Stable                Continue to monitor/telemetry/CBC with differential daily/BMP daily  DVT and GI prophylaxis.  Continue medications as below      Discussed with daughter    Scheduled Meds:    Continuous Infusions:    PRN Meds:          Chief Complaint:     Chief Complaint   Patient presents with    Dizziness    Nausea    Fall     Fall @ home fell on left shoulder c/o /3/10 pain glucose was reported 60's at home EMS level 65.          History of Present Illness:      Keith Oreilly is a 84 y.o.  male who presents with Dizziness, Nausea, and Fall (Fall @ home fell on left shoulder c/o /3/10 pain glucose was reported 60's at home EMS level 65. )    84-year-old gentleman presented to ER complaining of fall and generalized weakness.  Patient has had a fall at home prior to his presentation.  He landed on his right side.  He also stated he has had a fall previously outside in the yard on his left side.  He was complaining of  03/23/2024       Lab Results   Component Value Date/Time    SPECIAL 6ML LT AC 05/19/2024 11:25 PM     Lab Results   Component Value Date/Time    CULTURE NO GROWTH 05/20/2024 01:41 AM       No results found for: \"POCPH\", \"PHART\", \"PH\", \"POCPCO2\", \"QGF0FEY\", \"PCO2\", \"POCPO2\", \"PO2ART\", \"PO2\", \"POCHCO3\", \"IFE1RTC\", \"HCO3\", \"NBEA\", \"PBEA\", \"BEART\", \"BE\", \"THGBART\", \"THB\", \"GCK0DVO\", \"LZPP0IQG\", \"J9HIOKZW\", \"O2SAT\", \"FIO2\"    Radiology:    XR SHOULDER LEFT (MIN 2 VIEWS)    Result Date: 7/22/2024  No acute osseous or soft tissue abnormality. Severe degenerative change of the glenohumeral joint space.     XR CHEST (2 VW)    Result Date: 7/22/2024  No acute cardiopulmonary process.     CT Head W/O Contrast    Result Date: 7/22/2024  Chronic microvascular disease without acute intracranial abnormality.         All radiological studies reviewed                Code Status:  Prior    Electronically signed by Maddie Hazel MD on 7/22/2024 at 12:11 PM     Copy sent to Chaparrita Hernández MD    This note was created with the assistance of a speech-recognition program.  Although the intention is to generate a document that actually reflects the content of the visit, no guarantees can be provided that every mistake has been identified and corrected by editing.     Note was updated later by me after  physical examination and  completion of the assessment.

## 2024-07-22 NOTE — PROGRESS NOTES
End Of Shift Note  [facility]  Summary of shift: Pt admitted this shift for bradycardia. Plan for PPM placement Wednesday with Dr. Spencer Quintero on hold. Pt asymptomatic throughout shift. Able to ambulate to bathroom standby assist. Hx dementia, bed alarm set (pt has frequent falls).     Vitals:    Vitals:    07/22/24 1200 07/22/24 1303 07/22/24 1648 07/22/24 1700   BP: (!) 136/59  136/89 (!) 131/51   Pulse: (!) 39 60 (!) 45 (!) 43   Resp: 21      Temp: 97.7 °F (36.5 °C)      TempSrc: Temporal      SpO2: 99% 98%     Weight:       Height:            I&O: No intake or output data in the 24 hours ending 07/22/24 1707    Resp Status: Room air    Ventilator Settings:     / / /     Critical Care IV infusions:   sodium chloride      dextrose          LDA:   Peripheral IV 07/22/24 Proximal;Right Forearm (Active)   Number of days: 0       Wound Arm Lower;Posterior;Right (Active)   Number of days:        Incision 12/03/20 Foot Right (Active)   Number of days: 1327

## 2024-07-22 NOTE — ED PROVIDER NOTES
[facility] ED  eMERGENCY dEPARTMENT eNCOUnter      Pt Name: Keith Oreilly  MRN: [id number]  Birthdate 1940  Date of evaluation: 7/22/2024  Provider: Jill Huitron PA-C    CHIEF COMPLAINT       Chief Complaint   Patient presents with    Dizziness    Nausea    Fall     Fall @ home fell on left shoulder c/o /3/10 pain glucose was reported 60's at home EMS level 65.        HISTORY OF PRESENT ILLNESS  (Location/Symptom, Timing/Onset, Context/Setting, Quality, Duration, Modifying Factors, Severity.)   Keith Oreilly is a 84 y.o. male who presents to the emergency department with complaints of weakness and fall.  Patient has a past medical history of tachybradycardia syndrome, diabetes mellitus with poor control, lacunar infarct, hypertension, CAD, Alzheimer's dementia with agitation, dyslipidemia, paroxysmal A-fib.  Patient states that he was at home today and fell.  He states that he landed on his right side.  He did have a fall last week when he was outside in the grass in which he states he fell on his left side.  Patient has had some residual left shoulder pain from the initial fall last week.  Today the patient states that he fell but did not hit his head or neck.  No loss of consciousness.  According to EMS the wife originally took his blood sugar which is in the 60s.  Patient's blood sugar currently is in the 90s.  Patient was found to be bradycardic with a heart rate ranging between 30 and 40.      Patient does have a history of atrial fibrillation, dementia, poorly controlled diabetes.  He recently was admitted in March after being found down at his house, diagnosis was lacunar infarct, traumatic rhabdomyolysis, right arm weakness, hyperglycemia and leukocytosis.  Patient was recently started on Seroquel for agitation and Namenda for dementia at that visit.  Patient currently takes Eliquis, Lipitor, Lasix, Namenda.  He has a history of tachybradycardia syndrome.  Patient is extremely poor

## 2024-07-23 LAB
ANION GAP SERPL CALCULATED.3IONS-SCNC: 11 MMOL/L (ref 9–17)
BASOPHILS # BLD: 0.07 K/UL (ref 0–0.2)
BASOPHILS NFR BLD: 1 % (ref 0–2)
BNP SERPL-MCNC: 1735 PG/ML
BUN SERPL-MCNC: 22 MG/DL (ref 8–23)
BUN/CREAT SERPL: 18 (ref 9–20)
CALCIUM SERPL-MCNC: 8.8 MG/DL (ref 8.6–10.4)
CHLORIDE SERPL-SCNC: 105 MMOL/L (ref 98–107)
CO2 SERPL-SCNC: 23 MMOL/L (ref 20–31)
CREAT SERPL-MCNC: 1.2 MG/DL (ref 0.7–1.2)
EOSINOPHIL # BLD: 0.22 K/UL (ref 0–0.44)
EOSINOPHILS RELATIVE PERCENT: 3 % (ref 1–4)
ERYTHROCYTE [DISTWIDTH] IN BLOOD BY AUTOMATED COUNT: 14.6 % (ref 11.8–14.4)
GFR, ESTIMATED: 60 ML/MIN/1.73M2
GLUCOSE BLD-MCNC: 113 MG/DL (ref 75–110)
GLUCOSE BLD-MCNC: 155 MG/DL (ref 75–110)
GLUCOSE BLD-MCNC: 165 MG/DL (ref 75–110)
GLUCOSE BLD-MCNC: 183 MG/DL (ref 75–110)
GLUCOSE SERPL-MCNC: 119 MG/DL (ref 70–99)
HCT VFR BLD AUTO: 41.1 % (ref 40.7–50.3)
HGB BLD-MCNC: 13 G/DL (ref 13–17)
IMM GRANULOCYTES # BLD AUTO: 0.03 K/UL (ref 0–0.3)
IMM GRANULOCYTES NFR BLD: 0 %
LYMPHOCYTES NFR BLD: 2.31 K/UL (ref 1.1–3.7)
LYMPHOCYTES RELATIVE PERCENT: 29 % (ref 24–43)
MCH RBC QN AUTO: 27.4 PG (ref 25.2–33.5)
MCHC RBC AUTO-ENTMCNC: 31.6 G/DL (ref 28.4–34.8)
MCV RBC AUTO: 86.7 FL (ref 82.6–102.9)
MONOCYTES NFR BLD: 0.51 K/UL (ref 0.1–1.2)
MONOCYTES NFR BLD: 6 % (ref 3–12)
NEUTROPHILS NFR BLD: 61 % (ref 36–65)
NEUTS SEG NFR BLD: 4.92 K/UL (ref 1.5–8.1)
NRBC BLD-RTO: 0 PER 100 WBC
PLATELET # BLD AUTO: 201 K/UL (ref 138–453)
PMV BLD AUTO: 9.8 FL (ref 8.1–13.5)
POTASSIUM SERPL-SCNC: 4.1 MMOL/L (ref 3.7–5.3)
RBC # BLD AUTO: 4.74 M/UL (ref 4.21–5.77)
RBC # BLD: ABNORMAL 10*6/UL
SODIUM SERPL-SCNC: 139 MMOL/L (ref 135–144)
WBC OTHER # BLD: 8.1 K/UL (ref 3.5–11.3)

## 2024-07-23 PROCEDURE — 82947 ASSAY GLUCOSE BLOOD QUANT: CPT

## 2024-07-23 PROCEDURE — 97530 THERAPEUTIC ACTIVITIES: CPT

## 2024-07-23 PROCEDURE — 6370000000 HC RX 637 (ALT 250 FOR IP): Performed by: HOSPITALIST

## 2024-07-23 PROCEDURE — 97167 OT EVAL HIGH COMPLEX 60 MIN: CPT

## 2024-07-23 PROCEDURE — 2060000000 HC ICU INTERMEDIATE R&B

## 2024-07-23 PROCEDURE — 36415 COLL VENOUS BLD VENIPUNCTURE: CPT

## 2024-07-23 PROCEDURE — 80048 BASIC METABOLIC PNL TOTAL CA: CPT

## 2024-07-23 PROCEDURE — 83880 ASSAY OF NATRIURETIC PEPTIDE: CPT

## 2024-07-23 PROCEDURE — 2580000003 HC RX 258: Performed by: HOSPITALIST

## 2024-07-23 PROCEDURE — 97162 PT EVAL MOD COMPLEX 30 MIN: CPT

## 2024-07-23 PROCEDURE — 97535 SELF CARE MNGMENT TRAINING: CPT

## 2024-07-23 PROCEDURE — 85025 COMPLETE CBC W/AUTO DIFF WBC: CPT

## 2024-07-23 PROCEDURE — 97110 THERAPEUTIC EXERCISES: CPT

## 2024-07-23 PROCEDURE — 97116 GAIT TRAINING THERAPY: CPT

## 2024-07-23 RX ORDER — IBUPROFEN 200 MG
400 TABLET ORAL EVERY 6 HOURS PRN
Status: ON HOLD | COMMUNITY
End: 2024-07-25 | Stop reason: HOSPADM

## 2024-07-23 RX ORDER — FENOFIBRATE 54 MG/1
50 TABLET ORAL DAILY
Status: DISCONTINUED | OUTPATIENT
Start: 2024-07-24 | End: 2024-07-25 | Stop reason: HOSPADM

## 2024-07-23 RX ORDER — ACETAMINOPHEN 500 MG
1000 TABLET ORAL EVERY 6 HOURS PRN
COMMUNITY

## 2024-07-23 RX ORDER — FLUTICASONE PROPIONATE 50 MCG
1 SPRAY, SUSPENSION (ML) NASAL PRN
COMMUNITY

## 2024-07-23 RX ADMIN — ACETAMINOPHEN 650 MG: 325 TABLET ORAL at 17:30

## 2024-07-23 RX ADMIN — LOSARTAN POTASSIUM 100 MG: 100 TABLET, FILM COATED ORAL at 09:15

## 2024-07-23 RX ADMIN — ATORVASTATIN CALCIUM 80 MG: 80 TABLET, FILM COATED ORAL at 09:15

## 2024-07-23 RX ADMIN — FENOFIBRATE 160 MG: 160 TABLET ORAL at 09:15

## 2024-07-23 RX ADMIN — GLIPIZIDE 10 MG: 10 TABLET ORAL at 05:29

## 2024-07-23 RX ADMIN — SODIUM CHLORIDE, PRESERVATIVE FREE 10 ML: 5 INJECTION INTRAVENOUS at 20:15

## 2024-07-23 RX ADMIN — SODIUM CHLORIDE, PRESERVATIVE FREE 10 ML: 5 INJECTION INTRAVENOUS at 09:16

## 2024-07-23 RX ADMIN — MEMANTINE 5 MG: 10 TABLET ORAL at 09:16

## 2024-07-23 RX ADMIN — QUETIAPINE FUMARATE 25 MG: 25 TABLET ORAL at 20:15

## 2024-07-23 ASSESSMENT — PAIN SCALES - GENERAL
PAINLEVEL_OUTOF10: 3
PAINLEVEL_OUTOF10: 0
PAINLEVEL_OUTOF10: 2

## 2024-07-23 ASSESSMENT — PAIN DESCRIPTION - LOCATION: LOCATION: SHOULDER

## 2024-07-23 ASSESSMENT — PAIN DESCRIPTION - ORIENTATION: ORIENTATION: LEFT

## 2024-07-23 ASSESSMENT — PAIN DESCRIPTION - DESCRIPTORS: DESCRIPTORS: CRAMPING;DISCOMFORT

## 2024-07-23 NOTE — CARE COORDINATION
Social Work-Spoke with patient's wife and daughter regarding dc plans. If patient is able to return directly home, they would prefer [facility]. If patient requires rehab, [facility] would be the first choice. Sent referral to [facility] for review. Will finalize plans after pacemaker. Tammy

## 2024-07-23 NOTE — CARE COORDINATION
Case Management Assessment  Initial Evaluation    Date/Time of Evaluation: 7/23/2024 3:50 PM  Assessment Completed by: Neha Lilly RN    If patient is discharged prior to next notation, then this note serves as note for discharge by case management.    Patient Name: Keith Oreilly                   YOB: 1940  Diagnosis: Bradycardia [R00.1]  General weakness [R53.1]  Fall, initial encounter [W19.XXXA]                   Date / Time: 7/22/2024  9:08 AM    Patient Admission Status: Inpatient   Readmission Risk (Low < 19, Mod (19-27), High > 27): Readmission Risk Score: 16.7    Current PCP: Chaparrita Barrios MD  PCP verified by CM? Yes    Chart Reviewed: Yes      History Provided by: Significant Other  Patient Orientation: Other (see comment) (spoke with wife  patient has dementia)    Patient Cognition:      Hospitalization in the last 30 days (Readmission):  No    If yes, Readmission Assessment in CM Navigator will be completed.    Advance Directives:      Code Status: Full Code   Patient's Primary Decision Maker is: Named in Scanned ACP Document      Discharge Planning:    Patient lives with: Spouse/Significant Other Type of Home: Acute Rehab, Skilled Nursing Facility  Primary Care Giver: Spouse  Patient Support Systems include: Spouse/Significant Other, Children   Current Financial resources: Medicare  Current community resources:    Current services prior to admission: Meals On Wheels, Prescription Assistance            Current DME:              Type of Home Care services:  None    ADLS  Prior functional level: Assistance with the following:, Shopping, Mobility, Housework, Cooking  Current functional level: Assistance with the following:, Toileting, Dressing, Bathing, Cooking, Housework, Shopping, Mobility    PT AM-PAC: 14 /24  OT AM-PAC: 12 /24    Family can provide assistance at DC: Other (comment) (wife uses walker to get around and states she is tired)  Would you like Case Management to discuss  the discharge plan with any other family members/significant others, and if so, who? Yes (wife POA)  Plans to Return to Present Housing: Unknown at present  Other Identified Issues/Barriers to RETURNING to current housing: needs rehab  Potential Assistance needed at discharge: Skilled Nursing Facility            Potential DME:    Patient expects to discharge to: Other (comment) ([facility] vs SNF)  Plan for transportation at discharge:      Financial    Payor: [facility] MEDICARE / Plan: AN[employer] MEDIBLUE ESSENTIAL/PLUS / Product Type: *No Product type* /     Does insurance require precert for SNF: Yes    Potential assistance Purchasing Medications: No  Meds-to-Beds request: Yes      [facility] [id number] [place], [place] - [address] [facility] [phone] - F [phone]  [address] [facility] [address]  Phone: [phone] Fax: [phone]      Notes:    Factors facilitating achievement of predicted outcomes: Family support and Has needed Durable Medical Equipment at home    Barriers to discharge: Cognitive deficit, Decreased endurance, and Lower extremity weakness    Additional Case Management Notes: Patient lives with wife.  H/O dementia.  Admitted for bradycardia and fall.  tomorrow.  Plan is rehab.  Wife wants to speak with daughter prior to referral placements but is leaning towards [facility] (if able) or [facility].  Has been at [facility] in the past.   Has rollator, cane, walker, lift chair, lift bed at home.   Lives in 1 1/2 story home but stays on 1st floor.  4 small steps to enter.    The Plan for Transition of Care is related to the following treatment goals of Bradycardia [R00.1]  General weakness [R53.1]  Fall, initial encounter [W19.XXXA]    IF APPLICABLE: The Patient and/or patient representative Keith and his family were provided with a choice of provider and agrees with the discharge plan. Freedom of choice list with basic dialogue that supports the patient's individualized plan of care/goals and

## 2024-07-23 NOTE — PLAN OF CARE
Problem: Safety - Adult  Goal: Free from fall injury  7/23/2024 1051 by Rajiv Otero RN  Outcome: Progressing  7/23/2024 0221 by Tran June RN  Outcome: Progressing  Flowsheets (Taken 7/23/2024 0221)  Free From Fall Injury: Instruct family/caregiver on patient safety     Problem: Chronic Conditions and Co-morbidities  Goal: Patient's chronic conditions and co-morbidity symptoms are monitored and maintained or improved  7/23/2024 1051 by Rajiv Otero RN  Outcome: Progressing  7/23/2024 0221 by Tran June RN  Outcome: Progressing  Flowsheets (Taken 7/23/2024 0221)  Care Plan - Patient's Chronic Conditions and Co-Morbidity Symptoms are Monitored and Maintained or Improved:   Monitor and assess patient's chronic conditions and comorbid symptoms for stability, deterioration, or improvement   Collaborate with multidisciplinary team to address chronic and comorbid conditions and prevent exacerbation or deterioration   Update acute care plan with appropriate goals if chronic or comorbid symptoms are exacerbated and prevent overall improvement and discharge     Problem: Discharge Planning  Goal: Discharge to home or other facility with appropriate resources  7/23/2024 1051 by Rajiv Otero RN  Outcome: Progressing  7/23/2024 0221 by Tran June RN  Outcome: Progressing  Flowsheets (Taken 7/23/2024 0221)  Discharge to home or other facility with appropriate resources:   Identify barriers to discharge with patient and caregiver   Arrange for needed discharge resources and transportation as appropriate   Identify discharge learning needs (meds, wound care, etc)     Problem: Pain  Goal: Verbalizes/displays adequate comfort level or baseline comfort level  7/23/2024 1051 by Rajiv Otero RN  Outcome: Progressing  7/23/2024 0221 by Tran June RN  Outcome: Progressing  Flowsheets (Taken 7/23/2024 0221)  Verbalizes/displays adequate comfort level or baseline comfort level:   Encourage patient to

## 2024-07-23 NOTE — PLAN OF CARE
Problem: Safety - Adult  Goal: Free from fall injury  Outcome: Progressing  Flowsheets   Free From Fall Injury: Instruct family/caregiver on patient safety      Problem: Chronic Conditions and Co-morbidities  Goal: Patient's chronic conditions and co-morbidity symptoms are monitored and maintained or improved  Outcome: Progressing  Flowsheets   Care Plan - Patient's Chronic Conditions and Co-Morbidity Symptoms are Monitored and Maintained or Improved:   Monitor and assess patient's chronic conditions and comorbid symptoms for stability, deterioration, or improvement   Collaborate with multidisciplinary team to address chronic and comorbid conditions and prevent exacerbation or deterioration   Update acute care plan with appropriate goals if chronic or comorbid symptoms are exacerbated and prevent overall improvement and discharge      Problem: Discharge Planning  Goal: Discharge to home or other facility with appropriate resources  Outcome: Progressing  Flowsheets   Discharge to home or other facility with appropriate resources:   Identify barriers to discharge with patient and caregiver   Arrange for needed discharge resources and transportation as appropriate   Identify discharge learning needs (meds, wound care, etc)        Problem: Pain  Goal: Verbalizes/displays adequate comfort level or baseline comfort level  Outcome: Progressing  Flowsheets   Verbalizes/displays adequate comfort level or baseline comfort level:   Encourage patient to monitor pain and request assistance   Assess pain using appropriate pain scale   Implement non-pharmacological measures as appropriate and evaluate response   Notify Licensed Independent Practitioner if interventions unsuccessful or patient reports new pain   Administer analgesics based on type and severity of pain and evaluate response   Consider cultural and social influences on pain and pain management      Problem: Skin/Tissue Integrity  Goal: Absence of new skin

## 2024-07-23 NOTE — PROGRESS NOTES
No new complaints  No chest pain or dyspnea  No severe pauses overnight.  Hemodynamically stable  No evidence of fluid overload.     tomorrow.

## 2024-07-23 NOTE — PROGRESS NOTES
Occupational Therapy  Facility/Department: [facility]  Occupational Therapy Initial Assessment    Name: Keith Oreilly  : 1940  MRN: [id number]  Date of Service: 2024    CELENA MORTENSEN reports patient is medically stable for therapy treatment this date.    Chart reviewed prior to treatment and patient is agreeable for therapy.  All lines intact and patient positioned comfortably at end of treatment.  All patient needs addressed prior to ending therapy session.       Pt currently functioning below baseline.  Recommend daily inpatient skilled therapy at time of discharge to maximize long term outcomes and prevent re-admission. Please refer to AM-PAC score for current level of function.       Discharge Recommendations:  Patient would benefit from continued therapy after discharge  OT Equipment Recommendations  Equipment Needed:  (CTA)       Patient Diagnosis(es): The primary encounter diagnosis was Bradycardia. Diagnoses of General weakness and Fall, initial encounter were also pertinent to this visit.  Past Medical History:  has a past medical history of Arthritis, Atrial fibrillation (HCC), CAD (coronary artery disease), CHF (congestive heart failure) (HCC), Diabetes (HCC), Diverticulosis, Hyperlipidemia, Obesity, АННА on CPAP, Poor historian, and Renal stone.  Past Surgical History:  has a past surgical history that includes Appendectomy; other surgical history; Colonoscopy; Cardiac surgery; Cardiac catheterization (2020); joint replacement; and Bunionectomy (Right, 12/3/2020).      PER H&P:    Keith Oreilly is a 84 y.o.  male who presents with Dizziness, Nausea, and Fall (Fall @ home fell on left shoulder c/o /310 pain glucose was reported 60's at home EMS level 65. )     84-year-old gentleman presented to ER complaining of fall and generalized weakness.  Patient has had a fall at home prior to his presentation.  He landed on his right side.  He also stated he has had a fall previously outside in the  demo  Short Term Goal 1: bed mob tasks with use of rail as needed to CGA.  Short Term Goal 2: increase BUE strength by a 1/2 grade to assist with ADL's.  Short Term Goal 3: UB ADL to set up and LB ADL to mod assist of 1 and use of AD/AE as needed.  Short Term Goal 4: ADL transfers and functional mob with AD to CGA.  Short Term Goal 5: toileting tasks with use of BSC/AD and grab bar as needed to mod assist of 1.  Long Term Goals  Long Term Goal 1: Pt to stand with SBA and AD ash > 5 mins as able to reduce falls in function.  Long Term Goal 2: Caregivers to be I with pressure relief, EC/WS and fall prevention tech, edema mgt, BUE HEP, and DME/AE and AD recommendations with use of handouts.  Patient Goals   Patient goals : Pt states he wants to be able to plant his garden!       Therapy Time   Individual Concurrent Group Co-treatment   Time In 0858 (plus 10 min chart review/nursing communication)         Time Out 1007         Minutes 69=79 mins total          Timed Code Treatment Minutes: 64 Minutes       Carmel De Souza, OT

## 2024-07-23 NOTE — PROGRESS NOTES
Transitions of Care Pharmacy Service   Medication Review    The patient's list of current home medications has been reviewed.     Source(s) of information: spoke to patient wife (Karlie) and sure scripts     Based on information provided by the above source(s), I have updated the patient's home med list as described below.       I changed or updated the following medications on the patient's home medication list:  Discontinued melatonin 3 MG TABS tablet  doxazosin (CARDURA) 4 MG tablet  losartan (COZAAR) 100 MG tablet     Added Ketotifen Fumarate (ALLERGY EYE DROPS OP)  fluticasone (FLONASE) 50 MCG/ACT nasal spray  acetaminophen (TYLENOL) 500 MG tablet  ibuprofen (ADVIL;MOTRIN) 200 MG tablet     Adjusted   None      Other Notes None            Please feel free to call me with any questions about this encounter. Thank you.    This note will be reviewed and co-signed by the Transitions of Care Pharmacist. The pharmacist will review inpatient orders and contact the physician about any discrepancies.    Ryan Rojo, pharmacy technician  [facility]  Phone:  [phone]  Fax: [phone]      Electronically signed by Ryan Rojo on 7/23/2024 at 4:00 PM       Prior to Admission medications    Medication Sig   Ketotifen Fumarate (ALLERGY EYE DROPS OP) Apply 1 drop to eye as needed   fluticasone (FLONASE) 50 MCG/ACT nasal spray 1 spray by Each Nostril route as needed for Rhinitis   acetaminophen (TYLENOL) 500 MG tablet Take 2 tablets by mouth every 6 hours as needed for Pain     ibuprofen (ADVIL;MOTRIN) 200 MG tablet Take 2 tablets by mouth every 6 hours as needed for Pain   amLODIPine (NORVASC) 5 MG tablet Take 1 tablet by mouth daily   memantine (NAMENDA) 5 MG tablet Take 1 tablet by mouth daily   QUEtiapine (SEROQUEL) 25 MG tablet Take 1 tablet by mouth nightly   dulaglutide (TRULICITY) 1.5 MG/0.5ML SC injection Inject 0.5 mLs into the skin once a week (Saturday)   furosemide (LASIX) 20 MG

## 2024-07-23 NOTE — PROGRESS NOTES
Progress note  [facility]    Adult Hospitalist      Name: Keith Oreilly  MRN: [id number]     Acct: [id number]  Room: 2033/2033-01    Admit Date: 7/22/2024  9:08 AM  PCP: Chaparrita Barrios MD    Primary Problem  Principal Problem:    Bradycardia  Resolved Problems:    * No resolved hospital problems. *        Assesment/ plan:     Patient is admitted to intermediate level        Tachybradycardia syndrome  Patient presented with bradycardia  Hold AV ceci blocking agents  Monitor heart rate  Cardiology consult, plan for pacemaker placement  on 7/24/2024        Elevated troponin  Trend Trop:  Cardiology consult          Hypertensive heart disease  Monitor blood pressure  Hydralazine as needed  Continue antihypertensives as below        Coronary artery disease  Stable        Diabetes type 2  Monitor blood glucose  SSI        Chronic diastolic CHF  Euvolemic        Obstructive sleep apnea  On CPAP        Alzheimer's dementia  On Namenda and Seroquel        History of recent lacunar infarct  Stable                Continue to monitor/telemetry/CBC with differential daily/BMP daily  DVT and GI prophylaxis.  Continue medications as below      Discussed with daughter    Scheduled Meds:   [START ON 7/24/2024] fenofibrate  54 mg Oral Daily    atorvastatin  80 mg Oral Daily    doxazosin  4 mg Oral Nightly    [START ON 7/27/2024] dulaglutide  1.5 mg SubCUTAneous Weekly    furosemide  20 mg Oral Once per day on Mon Wed Fri    glipiZIDE  10 mg Oral QAM AC    losartan  100 mg Oral Daily    memantine  5 mg Oral Daily    QUEtiapine  25 mg Oral Nightly    sodium chloride flush  10 mL IntraVENous 2 times per day    insulin lispro  0-8 Units SubCUTAneous TID WC    insulin lispro  0-4 Units SubCUTAneous Nightly     Continuous Infusions:   sodium chloride      dextrose       PRN Meds:  sodium chloride flush, 10 mL, PRN  sodium chloride, , PRN  potassium chloride, 40 mEq, PRN   Or  potassium alternative oral replacement, 40 mEq, PRN

## 2024-07-23 NOTE — CONSULTS
Cardiovascular Consult Note     TODAY'S DATE: 7/23/2024    Patient name: Keith Oreilly   YOB: 1940  Date of admission:  7/22/2024       Patient seen, examined. Previous clinical entries reviewed.  All available laboratory, imaging and ancillary data reviewed.    Reason for Consult: Pacemaker placement  Referring Physician: Dr. Annie Garzon MD    History of present Illness:     Keith Oreilly is a 84 y.o. male with past medical history significant for coronary artery disease with bypass surgery, persistent atrial fibrillation on anticoagulation who has been having significant fatigue and was found to be severely bradycardic.  His heart rate was in the 30s.  He was evaluated and was referred to have a pacemaker placement.  He currently denies any acute chest pain.  No clear shortness of breath at rest.  No new symptoms of heart failure.        Past Medical History:    has a past medical history of Arthritis, Atrial fibrillation ([facility]), CAD (coronary artery disease), CHF (congestive heart failure) ([facility]), Diabetes ([facility]), Diverticulosis, Hyperlipidemia, Obesity, АННА on CPAP, Poor historian, and Renal stone.    Surgical History:     Past Surgical History:   Procedure Laterality Date    APPENDECTOMY      BUNIONECTOMY Right 12/3/2020    RIGHT 5TH METHEAD CONDYLECTOMY performed by Hugh Patel MD at [facility] OR    CARDIAC CATHETERIZATION  09/02/2020    : LEFT HEART CATH / LV   CORS    CARDIAC SURGERY      quad bypass    COLONOSCOPY      JOINT REPLACEMENT      patient denies    OTHER SURGICAL HISTORY      tumor removed from rib       Medications:   Scheduled Meds:   atorvastatin  80 mg Oral Daily    doxazosin  4 mg Oral Nightly    [START ON 7/27/2024] dulaglutide  1.5 mg SubCUTAneous Weekly    fenofibrate  160 mg Oral Daily    furosemide  20 mg Oral Once per day on Mon Wed Fri    glipiZIDE  10 mg Oral QAM AC    losartan  100 mg Oral Daily    memantine  5 mg Oral Daily    QUEtiapine  25 mg Oral Nightly

## 2024-07-23 NOTE — PROGRESS NOTES
Physical Therapy  Facility/Department: [facility]  Physical Therapy Initial Assessment    Name: Keith Oreilly  : 1940  MRN: [id number]  Date of Service: 2024    Discharge Recommendations:  Patient would benefit from continued therapy after discharge, 24 hour supervision or assist, Continue to assess pending progress        Pt presented to ED on 24 with complaints of weakness and fall.  Pt was at home today and fell.  He states that he landed on his right side.  He did have a fall last week when he was outside in the grass in which he states he fell on his left side.  Patient has had some residual left shoulder pain from the initial fall last week.  Today the patient states that he fell but did not hit his head or neck.  No loss of consciousness.  According to EMS the wife originally took his blood sugar which is in the 60s.  Patient's blood sugar currently is in the 90s.  Patient was found to be bradycardic with a heart rate ranging between 30 and 40.             Pt admitted for further medical management bradycardia    RN reports patient is medically stable for therapy treatment this date.    Chart reviewed prior to treatment and patient is agreeable for therapy.        Patient Diagnosis(es): The primary encounter diagnosis was Bradycardia. Diagnoses of General weakness and Fall, initial encounter were also pertinent to this visit.  Past Medical History:  has a past medical history of Arthritis, Atrial fibrillation (HCC), CAD (coronary artery disease), CHF (congestive heart failure) (HCC), Diabetes (HCC), Diverticulosis, Hyperlipidemia, Obesity, НАНА on CPAP, Poor historian, and Renal stone.  Past Surgical History:  has a past surgical history that includes Appendectomy; other surgical history; Colonoscopy; Cardiac surgery; Cardiac catheterization (2020); joint replacement; and Bunionectomy (Right, 12/3/2020).    Assessment   Body Structures, Functions, Activity Limitations Requiring Skilled

## 2024-07-24 ENCOUNTER — APPOINTMENT (OUTPATIENT)
Dept: GENERAL RADIOLOGY | Age: 84
DRG: 243 | End: 2024-07-24
Payer: MEDICARE

## 2024-07-24 LAB
ANION GAP SERPL CALCULATED.3IONS-SCNC: 9 MMOL/L (ref 9–17)
BASOPHILS # BLD: 0.06 K/UL (ref 0–0.2)
BASOPHILS NFR BLD: 1 % (ref 0–2)
BNP SERPL-MCNC: 870 PG/ML
BUN SERPL-MCNC: 20 MG/DL (ref 8–23)
BUN/CREAT SERPL: 18 (ref 9–20)
CALCIUM SERPL-MCNC: 8.8 MG/DL (ref 8.6–10.4)
CHLORIDE SERPL-SCNC: 109 MMOL/L (ref 98–107)
CO2 SERPL-SCNC: 25 MMOL/L (ref 20–31)
CREAT SERPL-MCNC: 1.1 MG/DL (ref 0.7–1.2)
ECHO BSA: 2.34 M2
EOSINOPHIL # BLD: 0.27 K/UL (ref 0–0.44)
EOSINOPHILS RELATIVE PERCENT: 4 % (ref 1–4)
ERYTHROCYTE [DISTWIDTH] IN BLOOD BY AUTOMATED COUNT: 14.6 % (ref 11.8–14.4)
GFR, ESTIMATED: 67 ML/MIN/1.73M2
GLUCOSE BLD-MCNC: 111 MG/DL (ref 75–110)
GLUCOSE BLD-MCNC: 128 MG/DL (ref 75–110)
GLUCOSE BLD-MCNC: 136 MG/DL (ref 75–110)
GLUCOSE BLD-MCNC: 172 MG/DL (ref 75–110)
GLUCOSE SERPL-MCNC: 121 MG/DL (ref 70–99)
HCT VFR BLD AUTO: 41 % (ref 40.7–50.3)
HGB BLD-MCNC: 13.3 G/DL (ref 13–17)
IMM GRANULOCYTES # BLD AUTO: 0.02 K/UL (ref 0–0.3)
IMM GRANULOCYTES NFR BLD: 0 %
LYMPHOCYTES NFR BLD: 2.05 K/UL (ref 1.1–3.7)
LYMPHOCYTES RELATIVE PERCENT: 31 % (ref 24–43)
MCH RBC QN AUTO: 28.1 PG (ref 25.2–33.5)
MCHC RBC AUTO-ENTMCNC: 32.4 G/DL (ref 28.4–34.8)
MCV RBC AUTO: 86.7 FL (ref 82.6–102.9)
MONOCYTES NFR BLD: 0.46 K/UL (ref 0.1–1.2)
MONOCYTES NFR BLD: 7 % (ref 3–12)
NEUTROPHILS NFR BLD: 57 % (ref 36–65)
NEUTS SEG NFR BLD: 3.68 K/UL (ref 1.5–8.1)
NRBC BLD-RTO: 0 PER 100 WBC
PLATELET # BLD AUTO: 197 K/UL (ref 138–453)
PMV BLD AUTO: 9.8 FL (ref 8.1–13.5)
POTASSIUM SERPL-SCNC: 4.1 MMOL/L (ref 3.7–5.3)
RBC # BLD AUTO: 4.73 M/UL (ref 4.21–5.77)
RBC # BLD: ABNORMAL 10*6/UL
SODIUM SERPL-SCNC: 143 MMOL/L (ref 135–144)
WBC OTHER # BLD: 6.5 K/UL (ref 3.5–11.3)

## 2024-07-24 PROCEDURE — 80048 BASIC METABOLIC PNL TOTAL CA: CPT

## 2024-07-24 PROCEDURE — 83880 ASSAY OF NATRIURETIC PEPTIDE: CPT

## 2024-07-24 PROCEDURE — 6370000000 HC RX 637 (ALT 250 FOR IP): Performed by: NURSE PRACTITIONER

## 2024-07-24 PROCEDURE — C1898 LEAD, PMKR, OTHER THAN TRANS: HCPCS | Performed by: INTERNAL MEDICINE

## 2024-07-24 PROCEDURE — 2060000000 HC ICU INTERMEDIATE R&B

## 2024-07-24 PROCEDURE — 85025 COMPLETE CBC W/AUTO DIFF WBC: CPT

## 2024-07-24 PROCEDURE — 02H63JZ INSERTION OF PACEMAKER LEAD INTO RIGHT ATRIUM, PERCUTANEOUS APPROACH: ICD-10-PCS | Performed by: INTERNAL MEDICINE

## 2024-07-24 PROCEDURE — 2580000003 HC RX 258: Performed by: INTERNAL MEDICINE

## 2024-07-24 PROCEDURE — 71045 X-RAY EXAM CHEST 1 VIEW: CPT

## 2024-07-24 PROCEDURE — 2580000003 HC RX 258: Performed by: HOSPITALIST

## 2024-07-24 PROCEDURE — C1894 INTRO/SHEATH, NON-LASER: HCPCS | Performed by: INTERNAL MEDICINE

## 2024-07-24 PROCEDURE — 82947 ASSAY GLUCOSE BLOOD QUANT: CPT

## 2024-07-24 PROCEDURE — 2500000003 HC RX 250 WO HCPCS: Performed by: INTERNAL MEDICINE

## 2024-07-24 PROCEDURE — 6370000000 HC RX 637 (ALT 250 FOR IP): Performed by: HOSPITALIST

## 2024-07-24 PROCEDURE — 99153 MOD SED SAME PHYS/QHP EA: CPT | Performed by: INTERNAL MEDICINE

## 2024-07-24 PROCEDURE — 33208 INSRT HEART PM ATRIAL & VENT: CPT | Performed by: INTERNAL MEDICINE

## 2024-07-24 PROCEDURE — 6360000002 HC RX W HCPCS: Performed by: HOSPITALIST

## 2024-07-24 PROCEDURE — 02HK3JZ INSERTION OF PACEMAKER LEAD INTO RIGHT VENTRICLE, PERCUTANEOUS APPROACH: ICD-10-PCS | Performed by: INTERNAL MEDICINE

## 2024-07-24 PROCEDURE — C1785 PMKR, DUAL, RATE-RESP: HCPCS | Performed by: INTERNAL MEDICINE

## 2024-07-24 PROCEDURE — 99152 MOD SED SAME PHYS/QHP 5/>YRS: CPT | Performed by: INTERNAL MEDICINE

## 2024-07-24 PROCEDURE — 2709999900 HC NON-CHARGEABLE SUPPLY: Performed by: INTERNAL MEDICINE

## 2024-07-24 PROCEDURE — 36415 COLL VENOUS BLD VENIPUNCTURE: CPT

## 2024-07-24 PROCEDURE — 94761 N-INVAS EAR/PLS OXIMETRY MLT: CPT

## 2024-07-24 PROCEDURE — 6360000002 HC RX W HCPCS: Performed by: INTERNAL MEDICINE

## 2024-07-24 PROCEDURE — 0JH606Z INSERTION OF PACEMAKER, DUAL CHAMBER INTO CHEST SUBCUTANEOUS TISSUE AND FASCIA, OPEN APPROACH: ICD-10-PCS | Performed by: INTERNAL MEDICINE

## 2024-07-24 PROCEDURE — C1892 INTRO/SHEATH,FIXED,PEEL-AWAY: HCPCS | Performed by: INTERNAL MEDICINE

## 2024-07-24 PROCEDURE — 6360000004 HC RX CONTRAST MEDICATION: Performed by: INTERNAL MEDICINE

## 2024-07-24 DEVICE — PACEMAKER CARD 20GM 10.4CC W50XH47MM THK6MM 2 CHMBR IS-1: Type: IMPLANTABLE DEVICE | Status: FUNCTIONAL

## 2024-07-24 DEVICE — LEAD PACE 6FR L52CM 10MM SPACE TI NITRIDE PLAT IRIDIUM SIL: Type: IMPLANTABLE DEVICE | Status: FUNCTIONAL

## 2024-07-24 DEVICE — LEAD PACE 6FR L58CM 10MM SPACE TI NITRIDE PLAT IRIDIUM SIL: Type: IMPLANTABLE DEVICE | Status: FUNCTIONAL

## 2024-07-24 RX ORDER — M-VIT,TX,IRON,MINS/CALC/FOLIC 27MG-0.4MG
1 TABLET ORAL DAILY
Status: DISCONTINUED | OUTPATIENT
Start: 2024-07-24 | End: 2024-07-25 | Stop reason: HOSPADM

## 2024-07-24 RX ORDER — SODIUM CHLORIDE 0.9 % (FLUSH) 0.9 %
5-40 SYRINGE (ML) INJECTION EVERY 12 HOURS SCHEDULED
Status: DISCONTINUED | OUTPATIENT
Start: 2024-07-24 | End: 2024-07-25 | Stop reason: HOSPADM

## 2024-07-24 RX ORDER — MIDAZOLAM HYDROCHLORIDE 1 MG/ML
INJECTION INTRAMUSCULAR; INTRAVENOUS PRN
Status: DISCONTINUED | OUTPATIENT
Start: 2024-07-24 | End: 2024-07-24 | Stop reason: HOSPADM

## 2024-07-24 RX ORDER — OMEGA-3-ACID ETHYL ESTERS 1 G/1
1 CAPSULE, LIQUID FILLED ORAL DAILY
Status: DISCONTINUED | OUTPATIENT
Start: 2024-07-24 | End: 2024-07-25 | Stop reason: HOSPADM

## 2024-07-24 RX ORDER — SODIUM CHLORIDE 0.9 % (FLUSH) 0.9 %
5-40 SYRINGE (ML) INJECTION PRN
Status: DISCONTINUED | OUTPATIENT
Start: 2024-07-24 | End: 2024-07-25 | Stop reason: HOSPADM

## 2024-07-24 RX ORDER — VITAMIN B COMPLEX
2000 TABLET ORAL DAILY
Status: DISCONTINUED | OUTPATIENT
Start: 2024-07-24 | End: 2024-07-25 | Stop reason: HOSPADM

## 2024-07-24 RX ORDER — FLUTICASONE PROPIONATE 50 MCG
1 SPRAY, SUSPENSION (ML) NASAL PRN
Status: DISCONTINUED | OUTPATIENT
Start: 2024-07-24 | End: 2024-07-25 | Stop reason: HOSPADM

## 2024-07-24 RX ORDER — FENTANYL CITRATE 50 UG/ML
INJECTION, SOLUTION INTRAMUSCULAR; INTRAVENOUS PRN
Status: DISCONTINUED | OUTPATIENT
Start: 2024-07-24 | End: 2024-07-24 | Stop reason: HOSPADM

## 2024-07-24 RX ORDER — VITAMIN C
1 TAB ORAL DAILY
Status: DISCONTINUED | OUTPATIENT
Start: 2024-07-24 | End: 2024-07-25 | Stop reason: HOSPADM

## 2024-07-24 RX ORDER — SODIUM CHLORIDE 9 MG/ML
INJECTION, SOLUTION INTRAVENOUS PRN
Status: DISCONTINUED | OUTPATIENT
Start: 2024-07-24 | End: 2024-07-25 | Stop reason: HOSPADM

## 2024-07-24 RX ADMIN — FUROSEMIDE 20 MG: 20 TABLET ORAL at 18:23

## 2024-07-24 RX ADMIN — SODIUM CHLORIDE, PRESERVATIVE FREE 10 ML: 5 INJECTION INTRAVENOUS at 09:00

## 2024-07-24 RX ADMIN — Medication 2000 UNITS: at 17:04

## 2024-07-24 RX ADMIN — OMEGA-3-ACID ETHYL ESTERS 1 CAPSULE: 1 CAPSULE, LIQUID FILLED ORAL at 17:04

## 2024-07-24 RX ADMIN — ACETAMINOPHEN 650 MG: 325 TABLET ORAL at 12:28

## 2024-07-24 RX ADMIN — Medication 1 TABLET: at 17:04

## 2024-07-24 RX ADMIN — METFORMIN HYDROCHLORIDE 1000 MG: 500 TABLET ORAL at 17:04

## 2024-07-24 RX ADMIN — HYDRALAZINE HYDROCHLORIDE 10 MG: 20 INJECTION INTRAMUSCULAR; INTRAVENOUS at 12:37

## 2024-07-24 RX ADMIN — MEMANTINE 5 MG: 10 TABLET ORAL at 11:28

## 2024-07-24 RX ADMIN — FENOFIBRATE 54 MG: 54 TABLET ORAL at 11:28

## 2024-07-24 RX ADMIN — QUETIAPINE FUMARATE 25 MG: 25 TABLET ORAL at 21:41

## 2024-07-24 RX ADMIN — SODIUM CHLORIDE, PRESERVATIVE FREE 10 ML: 5 INJECTION INTRAVENOUS at 21:41

## 2024-07-24 RX ADMIN — ATORVASTATIN CALCIUM 80 MG: 80 TABLET, FILM COATED ORAL at 11:29

## 2024-07-24 ASSESSMENT — PAIN DESCRIPTION - LOCATION: LOCATION: BACK

## 2024-07-24 ASSESSMENT — PAIN DESCRIPTION - DESCRIPTORS: DESCRIPTORS: DISCOMFORT;ACHING

## 2024-07-24 ASSESSMENT — PAIN SCALES - GENERAL
PAINLEVEL_OUTOF10: 1
PAINLEVEL_OUTOF10: 0

## 2024-07-24 ASSESSMENT — PAIN DESCRIPTION - ORIENTATION: ORIENTATION: POSTERIOR

## 2024-07-24 NOTE — PROGRESS NOTES
End Of Shift Note  [facility]  Summary of shift: Pt SBP has been elevated in the 150's throughout the night, HR in the 30's-40's while at rest. Pt had frequently got out of bed to use the bathroom. Pt plan is to get a pacemaker placed today.     Vitals:    Vitals:    07/24/24 0015 07/24/24 0343 07/24/24 0402 07/24/24 0602   BP: (!) 154/106  (!) 156/49    Pulse: 51 (!) 48 51 52   Resp: 16  18    Temp: 97.5 °F (36.4 °C)  97.3 °F (36.3 °C)    TempSrc: Temporal  Temporal    SpO2: 96% 93% 94% 98%   Weight:       Height:            I&O:   Intake/Output Summary (Last 24 hours) at 7/24/2024 0610  Last data filed at 7/23/2024 0916  Gross per 24 hour   Intake 10 ml   Output --   Net 10 ml       Resp Status: RA    Ventilator Settings:     / / /     Critical Care IV infusions:   sodium chloride      dextrose          LDA:   Peripheral IV 07/22/24 Proximal;Right Forearm (Active)   Number of days: 1       Wound Arm Lower;Posterior;Right (Active)   Number of days:        Incision 12/03/20 Foot Right (Active)   Number of days: 1329

## 2024-07-24 NOTE — PROGRESS NOTES
Pt has no specific complaint.  A fib with bradycardic response  Normal BP  No CHF on PE     later this afternoon.

## 2024-07-24 NOTE — PLAN OF CARE
Problem: Safety - Adult  Goal: Free from fall injury  7/24/2024 1006 by Rajiv Otero RN  Outcome: Progressing  7/24/2024 0610 by Yara Newell RN  Outcome: Progressing  Flowsheets (Taken 7/23/2024 2000)  Free From Fall Injury: Instruct family/caregiver on patient safety     Problem: Chronic Conditions and Co-morbidities  Goal: Patient's chronic conditions and co-morbidity symptoms are monitored and maintained or improved  7/24/2024 1006 by Rajiv Otero RN  Outcome: Progressing  7/24/2024 0610 by Yara Newell RN  Outcome: Progressing  Flowsheets (Taken 7/23/2024 2000)  Care Plan - Patient's Chronic Conditions and Co-Morbidity Symptoms are Monitored and Maintained or Improved:   Monitor and assess patient's chronic conditions and comorbid symptoms for stability, deterioration, or improvement   Collaborate with multidisciplinary team to address chronic and comorbid conditions and prevent exacerbation or deterioration   Update acute care plan with appropriate goals if chronic or comorbid symptoms are exacerbated and prevent overall improvement and discharge     Problem: Discharge Planning  Goal: Discharge to home or other facility with appropriate resources  7/24/2024 1006 by Rajiv Otero RN  Outcome: Progressing  7/24/2024 0610 by Yara Newell RN  Outcome: Progressing  Flowsheets (Taken 7/23/2024 2000)  Discharge to home or other facility with appropriate resources:   Identify barriers to discharge with patient and caregiver   Arrange for needed discharge resources and transportation as appropriate   Identify discharge learning needs (meds, wound care, etc)   Arrange for interpreters to assist at discharge as needed   Refer to discharge planning if patient needs post-hospital services based on physician order or complex needs related to functional status, cognitive ability or social support system     Problem: Pain  Goal: Verbalizes/displays adequate comfort level or baseline comfort level  7/24/2024 1006 by

## 2024-07-24 NOTE — DISCHARGE INSTR - COC
Continuity of Care Form    Patient Name: Keith Oreilly   :  1940  MRN:  [id number]    Admit date:  2024  Discharge date:  2024    Code Status Order: Full Code   Advance Directives:     Admitting Physician:  Maddie Hazel MD  PCP: Chaparrita Barrios MD    Discharging Nurse: Rajiv [facility] /-01  Discharging Unit Phone Number: [phone]    Emergency Contact:   Extended Emergency Contact Information  Primary Emergency Contact: Karlie Oreilly  Address: [facility] [place] [address]  Home Phone: [phone]  Mobile Phone: [phone]  Relation: Spouse  Secondary Emergency Contact: Vishal Oreilly  Mobile Phone: [phone]  Relation: Child  Preferred language: English   needed? No    Past Surgical History:  Past Surgical History:   Procedure Laterality Date    APPENDECTOMY      BUNIONECTOMY Right 12/3/2020    RIGHT 5TH METHEAD CONDYLECTOMY performed by Hugh Patel MD at [facility] OR    CARDIAC CATHETERIZATION  2020    : LEFT HEART CATH / LV   CORS    CARDIAC SURGERY      quad bypass    COLONOSCOPY      JOINT REPLACEMENT      patient denies    OTHER SURGICAL HISTORY      tumor removed from rib       Immunization History:   Immunization History   Administered Date(s) Administered    COVID-19, MODERNA BLUE border, Primary or Immunocompromised, (age 12y+), IM, 100 mcg/0.5mL 2021, 07/15/2022       Active Problems:  Patient Active Problem List   Diagnosis Code    Dupuytren's contracture of left hand M72.0    Tailor's bunion of right foot M21.621    CVA (cerebrovascular accident due to intracerebral hemorrhage) ([facility]) I61.9    Encephalopathy G93.40    Cognitive impairment R41.89    Leukocytosis D72.829    Lacunar infarction ([facility]) I63.81    Right arm weakness R29.898    Bradycardia R00.1       Isolation/Infection:   Isolation            No Isolation          Patient Infection Status       None to display                     Nurse

## 2024-07-24 NOTE — PRE SEDATION
Sedation Plan  ASA: class 2 - patient with mild systemic disease     Mallampati class: III - soft palate, base of uvula visible.    Sedation plan: level 2-1: moderate/analgesia (conscious sedation)    Risks, benefits, and alternatives discussed with patient.

## 2024-07-24 NOTE — PLAN OF CARE
response   Implement non-pharmacological measures as appropriate and evaluate response   Consider cultural and social influences on pain and pain management     Problem: Skin/Tissue Integrity  Goal: Absence of new skin breakdown  Description: 1.  Monitor for areas of redness and/or skin breakdown  2.  Assess vascular access sites hourly  3.  Every 4-6 hours minimum:  Change oxygen saturation probe site  4.  Every 4-6 hours:  If on nasal continuous positive airway pressure, respiratory therapy assess nares and determine need for appliance change or resting period.  Outcome: Progressing     Problem: ABCDS Injury Assessment  Goal: Absence of physical injury  Outcome: Progressing  Flowsheets (Taken 7/23/2024 2000)  Absence of Physical Injury: Implement safety measures based on patient assessment     Problem: Neurosensory - Adult  Goal: Achieves stable or improved neurological status  Outcome: Progressing  Flowsheets (Taken 7/23/2024 2000)  Achieves stable or improved neurological status:   Assess for and report changes in neurological status   Initiate measures to prevent increased intracranial pressure   Maintain blood pressure and fluid volume within ordered parameters to optimize cerebral perfusion and minimize risk of hemorrhage   Monitor temperature, glucose, and sodium. Initiate appropriate interventions as ordered     Problem: Cardiovascular - Adult  Goal: Maintains optimal cardiac output and hemodynamic stability  Outcome: Progressing  Flowsheets (Taken 7/23/2024 2000)  Maintains optimal cardiac output and hemodynamic stability:   Monitor blood pressure and heart rate   Assess for signs of decreased cardiac output   Administer vasoactive medications as ordered   Administer fluid and/or volume expanders as ordered   Monitor urine output and notify Licensed Independent Practitioner for values outside of normal range  Goal: Absence of cardiac dysrhythmias or at baseline  Outcome: Progressing  Flowsheets (Taken  7/23/2024 2000)  Absence of cardiac dysrhythmias or at baseline:   Monitor cardiac rate and rhythm   Assess for signs of decreased cardiac output   Administer antiarrhythmia medication and electrolyte replacement as ordered     Problem: Metabolic/Fluid and Electrolytes - Adult  Goal: Glucose maintained within prescribed range  Outcome: Progressing  Flowsheets (Taken 7/23/2024 2000)  Glucose maintained within prescribed range:   Monitor blood glucose as ordered   Assess for signs and symptoms of hyperglycemia and hypoglycemia   Administer ordered medications to maintain glucose within target range   Assess barriers to adequate nutritional intake and initiate nutrition consult as needed   Instruct patient on self management of diabetes and initiate consult as needed     Problem: Skin/Tissue Integrity - Adult  Goal: Skin integrity remains intact  Outcome: Progressing  Flowsheets (Taken 7/23/2024 2000)  Skin Integrity Remains Intact:   Monitor for areas of redness and/or skin breakdown   Assess vascular access sites hourly   Every 4-6 hours minimum: Change oxygen saturation probe site   Every 4-6 hours: If on nasal continuous positive airway pressure, respiratory therapy assesses nares and determine need for appliance change or resting period

## 2024-07-24 NOTE — INTERVAL H&P NOTE
Update History & Physical    The patient's History and Physical of July 22, 2024 was reviewed with the patient and I examined the patient. There was no change. The surgical site was confirmed by the patient and me.     Plan: The risks, benefits, expected outcome, and alternative to the recommended procedure have been discussed with the patient. Patient understands and wants to proceed with the procedure.     Electronically signed by Keith Banda MD on 7/24/2024 at 2:46 PM

## 2024-07-24 NOTE — CARE COORDINATION
DC Planning    CHART REVIEW    Pt for PPM today will need follow up tomorrow with PT/OT [facility]HC VS SNF.

## 2024-07-24 NOTE — PROGRESS NOTES
Acids (FISH OIL) 1000 MG capsule Take 1 capsule by mouth daily    Wiley Mcgraw MD   Multiple Vitamins-Minerals (THERAPEUTIC MULTIVITAMIN-MINERALS) tablet Take 1 tablet by mouth daily    Wiley Mcgraw MD   apixaban (ELIQUIS) 5 MG TABS tablet Take 1 tablet by mouth 2 times daily    Wiley Mcgraw MD   fenofibrate (TRICOR) 145 MG tablet Take 1 tablet by mouth daily 16   Wiley Mcgraw MD   glimepiride (AMARYL) 4 MG tablet Take 2 tablets by mouth every morning (before breakfast) 16   Wiley Mcgraw MD   metFORMIN (GLUCOPHAGE) 500 MG tablet Take 2 tablets by mouth 2 times daily (with meals)    Wiley Mcgraw MD   aspirin 81 MG tablet Take 1 tablet by mouth daily    Wiley Mcgraw MD   atorvastatin (LIPITOR) 80 MG tablet Take 1 tablet by mouth daily    Wiley Mcgraw MD   Vitamin D, Cholecalciferol, 50 MCG ( UT) CAPS Take 1 capsule by mouth daily    Wiley Mcgraw MD   B Complex Vitamins (VITAMIN B COMPLEX PO) Take 1 tablet by mouth daily    Wiley Mcgraw MD   Cranberry 1000 MG CAPS Take 1 capsule by mouth daily    Wiley Mcgraw MD        Allergies:       Lisinopril    Social History:     Tobacco:    reports that he quit smoking about 49 years ago. His smoking use included cigarettes. He has never used smokeless tobacco.  Alcohol:      reports current alcohol use of about 2.0 standard drinks of alcohol per week.  Drug Use:  reports no history of drug use.    Family History:     Family History   Problem Relation Age of Onset    Other Neg Hx         blood clots         Physical Exam:     Vitals:  BP (!) 176/63 Comment: hydralazine given  Pulse (!) 49   Temp 96.9 °F (36.1 °C) (Temporal)   Resp 18   Ht 1.803 m (5' 11\")   Wt 109.8 kg (242 lb)   SpO2 96%   BMI 33.75 kg/m²   Temp (24hrs), Av.3 °F (36.3 °C), Min:96.9 °F (36.1 °C), Max:97.5 °F (36.4 °C)          General appearance - alert, well appearing, and in no acute  --    POCGLU  --    < > 113* 155* 183* 165* 136* 128*    < > = values in this interval not displayed.         Lab Results   Component Value Date    INR 1.1 07/22/2024    INR 1.1 05/19/2024    INR 1.1 03/23/2024    PROTIME 14.0 07/22/2024    PROTIME 14.0 05/19/2024    PROTIME 14.1 03/23/2024       Lab Results   Component Value Date/Time    SPECIAL 6ML LT AC 05/19/2024 11:25 PM     Lab Results   Component Value Date/Time    CULTURE NO GROWTH 05/20/2024 01:41 AM       No results found for: \"POCPH\", \"PHART\", \"PH\", \"POCPCO2\", \"DTG3OFB\", \"PCO2\", \"POCPO2\", \"PO2ART\", \"PO2\", \"POCHCO3\", \"OAM6GPK\", \"HCO3\", \"NBEA\", \"PBEA\", \"BEART\", \"BE\", \"THGBART\", \"THB\", \"OIM0KGO\", \"OFIH1OCB\", \"P6RDMVMA\", \"O2SAT\", \"FIO2\"    Radiology:    XR SHOULDER LEFT (MIN 2 VIEWS)    Result Date: 7/22/2024  No acute osseous or soft tissue abnormality. Severe degenerative change of the glenohumeral joint space.     XR CHEST (2 VW)    Result Date: 7/22/2024  No acute cardiopulmonary process.     CT Head W/O Contrast    Result Date: 7/22/2024  Chronic microvascular disease without acute intracranial abnormality.         All radiological studies reviewed                Code Status:  Full Code    Electronically signed by Maddie Hazel MD on 7/24/2024 at 2:32 PM     Copy sent to Chaparrita Hernández MD    This note was created with the assistance of a speech-recognition program.  Although the intention is to generate a document that actually reflects the content of the visit, no guarantees can be provided that every mistake has been identified and corrected by editing.     Note was updated later by me after  physical examination and  completion of the assessment.

## 2024-07-25 ENCOUNTER — APPOINTMENT (OUTPATIENT)
Dept: GENERAL RADIOLOGY | Age: 84
DRG: 243 | End: 2024-07-25
Payer: MEDICARE

## 2024-07-25 VITALS
RESPIRATION RATE: 16 BRPM | WEIGHT: 241.4 LBS | TEMPERATURE: 97.7 F | HEIGHT: 71 IN | BODY MASS INDEX: 33.8 KG/M2 | DIASTOLIC BLOOD PRESSURE: 69 MMHG | OXYGEN SATURATION: 96 % | SYSTOLIC BLOOD PRESSURE: 152 MMHG | HEART RATE: 70 BPM

## 2024-07-25 LAB
ANION GAP SERPL CALCULATED.3IONS-SCNC: 11 MMOL/L (ref 9–17)
BASOPHILS # BLD: 0.05 K/UL (ref 0–0.2)
BASOPHILS NFR BLD: 1 % (ref 0–2)
BNP SERPL-MCNC: 1148 PG/ML
BUN SERPL-MCNC: 19 MG/DL (ref 8–23)
BUN/CREAT SERPL: 19 (ref 9–20)
CALCIUM SERPL-MCNC: 9 MG/DL (ref 8.6–10.4)
CHLORIDE SERPL-SCNC: 105 MMOL/L (ref 98–107)
CO2 SERPL-SCNC: 24 MMOL/L (ref 20–31)
CREAT SERPL-MCNC: 1 MG/DL (ref 0.7–1.2)
EOSINOPHIL # BLD: 0.3 K/UL (ref 0–0.44)
EOSINOPHILS RELATIVE PERCENT: 4 % (ref 1–4)
ERYTHROCYTE [DISTWIDTH] IN BLOOD BY AUTOMATED COUNT: 14.7 % (ref 11.8–14.4)
GFR, ESTIMATED: 75 ML/MIN/1.73M2
GLUCOSE BLD-MCNC: 124 MG/DL (ref 75–110)
GLUCOSE BLD-MCNC: 139 MG/DL (ref 75–110)
GLUCOSE BLD-MCNC: 85 MG/DL (ref 75–110)
GLUCOSE SERPL-MCNC: 122 MG/DL (ref 70–99)
HCT VFR BLD AUTO: 42.2 % (ref 40.7–50.3)
HGB BLD-MCNC: 13.6 G/DL (ref 13–17)
IMM GRANULOCYTES # BLD AUTO: 0.02 K/UL (ref 0–0.3)
IMM GRANULOCYTES NFR BLD: 0 %
LYMPHOCYTES NFR BLD: 2.17 K/UL (ref 1.1–3.7)
LYMPHOCYTES RELATIVE PERCENT: 26 % (ref 24–43)
MCH RBC QN AUTO: 27.6 PG (ref 25.2–33.5)
MCHC RBC AUTO-ENTMCNC: 32.2 G/DL (ref 28.4–34.8)
MCV RBC AUTO: 85.8 FL (ref 82.6–102.9)
MONOCYTES NFR BLD: 0.56 K/UL (ref 0.1–1.2)
MONOCYTES NFR BLD: 7 % (ref 3–12)
NEUTROPHILS NFR BLD: 62 % (ref 36–65)
NEUTS SEG NFR BLD: 5.4 K/UL (ref 1.5–8.1)
NRBC BLD-RTO: 0 PER 100 WBC
PLATELET # BLD AUTO: 219 K/UL (ref 138–453)
PMV BLD AUTO: 9.5 FL (ref 8.1–13.5)
POTASSIUM SERPL-SCNC: 3.7 MMOL/L (ref 3.7–5.3)
RBC # BLD AUTO: 4.92 M/UL (ref 4.21–5.77)
RBC # BLD: ABNORMAL 10*6/UL
SODIUM SERPL-SCNC: 140 MMOL/L (ref 135–144)
WBC OTHER # BLD: 8.5 K/UL (ref 3.5–11.3)

## 2024-07-25 PROCEDURE — 2580000003 HC RX 258: Performed by: HOSPITALIST

## 2024-07-25 PROCEDURE — 97168 OT RE-EVAL EST PLAN CARE: CPT

## 2024-07-25 PROCEDURE — 71046 X-RAY EXAM CHEST 2 VIEWS: CPT

## 2024-07-25 PROCEDURE — 83880 ASSAY OF NATRIURETIC PEPTIDE: CPT

## 2024-07-25 PROCEDURE — 82947 ASSAY GLUCOSE BLOOD QUANT: CPT

## 2024-07-25 PROCEDURE — 6370000000 HC RX 637 (ALT 250 FOR IP): Performed by: HOSPITALIST

## 2024-07-25 PROCEDURE — 36415 COLL VENOUS BLD VENIPUNCTURE: CPT

## 2024-07-25 PROCEDURE — 6370000000 HC RX 637 (ALT 250 FOR IP): Performed by: NURSE PRACTITIONER

## 2024-07-25 PROCEDURE — 6360000002 HC RX W HCPCS: Performed by: HOSPITALIST

## 2024-07-25 PROCEDURE — 97535 SELF CARE MNGMENT TRAINING: CPT

## 2024-07-25 PROCEDURE — 94761 N-INVAS EAR/PLS OXIMETRY MLT: CPT

## 2024-07-25 PROCEDURE — 97110 THERAPEUTIC EXERCISES: CPT

## 2024-07-25 PROCEDURE — 80048 BASIC METABOLIC PNL TOTAL CA: CPT

## 2024-07-25 PROCEDURE — 97530 THERAPEUTIC ACTIVITIES: CPT

## 2024-07-25 PROCEDURE — 85025 COMPLETE CBC W/AUTO DIFF WBC: CPT

## 2024-07-25 RX ORDER — LOSARTAN POTASSIUM 100 MG/1
100 TABLET ORAL DAILY
Qty: 30 TABLET | Refills: 3 | Status: SHIPPED | OUTPATIENT
Start: 2024-07-25

## 2024-07-25 RX ORDER — DOXAZOSIN MESYLATE 4 MG/1
4 TABLET ORAL NIGHTLY
Qty: 30 TABLET | Refills: 3 | Status: SHIPPED | OUTPATIENT
Start: 2024-07-25

## 2024-07-25 RX ORDER — LOSARTAN POTASSIUM 100 MG/1
100 TABLET ORAL DAILY
Status: DISCONTINUED | OUTPATIENT
Start: 2024-07-25 | End: 2024-07-25 | Stop reason: HOSPADM

## 2024-07-25 RX ORDER — METOPROLOL SUCCINATE 25 MG/1
25 TABLET, EXTENDED RELEASE ORAL DAILY
Status: DISCONTINUED | OUTPATIENT
Start: 2024-07-25 | End: 2024-07-25 | Stop reason: HOSPADM

## 2024-07-25 RX ORDER — METOPROLOL SUCCINATE 25 MG/1
25 TABLET, EXTENDED RELEASE ORAL DAILY
Qty: 30 TABLET | Refills: 3 | Status: SHIPPED | OUTPATIENT
Start: 2024-07-26

## 2024-07-25 RX ORDER — DOXAZOSIN MESYLATE 4 MG/1
4 TABLET ORAL NIGHTLY
Status: DISCONTINUED | OUTPATIENT
Start: 2024-07-25 | End: 2024-07-25 | Stop reason: HOSPADM

## 2024-07-25 RX ADMIN — Medication 1 TABLET: at 08:08

## 2024-07-25 RX ADMIN — MEMANTINE 5 MG: 10 TABLET ORAL at 08:08

## 2024-07-25 RX ADMIN — METFORMIN HYDROCHLORIDE 1000 MG: 500 TABLET ORAL at 08:09

## 2024-07-25 RX ADMIN — HYDRALAZINE HYDROCHLORIDE 10 MG: 20 INJECTION INTRAMUSCULAR; INTRAVENOUS at 01:05

## 2024-07-25 RX ADMIN — METFORMIN HYDROCHLORIDE 1000 MG: 500 TABLET ORAL at 17:29

## 2024-07-25 RX ADMIN — SODIUM CHLORIDE, PRESERVATIVE FREE 10 ML: 5 INJECTION INTRAVENOUS at 08:09

## 2024-07-25 RX ADMIN — APIXABAN 5 MG: 5 TABLET, FILM COATED ORAL at 14:15

## 2024-07-25 RX ADMIN — ATORVASTATIN CALCIUM 80 MG: 80 TABLET, FILM COATED ORAL at 08:08

## 2024-07-25 RX ADMIN — ACETAMINOPHEN 650 MG: 325 TABLET ORAL at 01:04

## 2024-07-25 RX ADMIN — LOSARTAN POTASSIUM 100 MG: 100 TABLET, FILM COATED ORAL at 14:15

## 2024-07-25 RX ADMIN — GLIPIZIDE 10 MG: 10 TABLET ORAL at 08:08

## 2024-07-25 RX ADMIN — Medication 2000 UNITS: at 08:12

## 2024-07-25 RX ADMIN — OMEGA-3-ACID ETHYL ESTERS 1 CAPSULE: 1 CAPSULE, LIQUID FILLED ORAL at 08:08

## 2024-07-25 RX ADMIN — FENOFIBRATE 54 MG: 54 TABLET ORAL at 08:08

## 2024-07-25 RX ADMIN — METOPROLOL SUCCINATE 25 MG: 25 TABLET, EXTENDED RELEASE ORAL at 14:15

## 2024-07-25 ASSESSMENT — PAIN SCALES - GENERAL
PAINLEVEL_OUTOF10: 0
PAINLEVEL_OUTOF10: 1
PAINLEVEL_OUTOF10: 3

## 2024-07-25 ASSESSMENT — PAIN DESCRIPTION - DESCRIPTORS: DESCRIPTORS: ACHING

## 2024-07-25 ASSESSMENT — PAIN DESCRIPTION - LOCATION: LOCATION: NECK;BACK

## 2024-07-25 NOTE — PROGRESS NOTES
SPIRITUAL CARE DEPARTMENT - [facility]  PROGRESS NOTE    Room # 2033/2033-01   Name: Keith Oreilly            Faith: [employer]     Reason for visit: Routine    I visited the patient.    Admit Date & Time: 7/22/2024  9:08 AM    Assessment:  Keith Oreilly is a 84 y.o. male in the hospital because of Bradycardia. Upon entering the room patient was lying bed and engaged readily with . Patient stated having received a heart pace maker and hopeful of going home today. Patient reported having good family support and enjoying grandchildren and great grandchildren in residential. Patient is at peace with himself, others, and God.       Intervention:  I introduced myself and my title as . I offered space for patient  to express feelings, needs, and concerns and provided a ministry presence.  provided connection, active listening, validation of patient's feelings, friendship and prayer.    Outcome:  Patient expressed thanks for  visit and welcomed  \"anytime.\"    Plan:  Chaplains will remain available to offer spiritual and emotional support as needed.   07/25/24 1414   Encounter Summary   Encounter Overview/Reason Initial Encounter   Service Provided For Patient   Referral/Consult From [facility]   Support System Family members   Last Encounter  07/25/24   Complexity of Encounter Low   Begin Time 1358   End Time  1413   Total Time Calculated 15 min   Assessment/Intervention/Outcome   Assessment Calm;Coping;Peaceful   Intervention Active listening;Discussed illness injury and it’s impact;Explored/Affirmed feelings, thoughts, concerns;Life review/Legacy;Prayer (assurance of)/[place]   Outcome Acceptance;Engaged in conversation;Coping;Expressed feelings of Jennie, Peace and/or Love;Optimistic;Receptive   Plan and Referrals   Plan/Referrals Continue to visit, (comment)         Electronically signed by Chaplain Hank, on 7/25/2024 at 2:17 PM.  Spiritual Care

## 2024-07-25 NOTE — PLAN OF CARE
Problem: Safety - Adult  Goal: Free from fall injury  7/25/2024 1745 by Rajiv Otero RN  Outcome: Adequate for Discharge  7/25/2024 0819 by Rajiv Otero RN  Outcome: Progressing     Problem: Chronic Conditions and Co-morbidities  Goal: Patient's chronic conditions and co-morbidity symptoms are monitored and maintained or improved  7/25/2024 1745 by Rajiv Otero RN  Outcome: Adequate for Discharge  7/25/2024 0819 by Rajiv Otero RN  Outcome: Progressing     Problem: Discharge Planning  Goal: Discharge to home or other facility with appropriate resources  7/25/2024 1745 by Rajiv Otero RN  Outcome: Adequate for Discharge  7/25/2024 0819 by Rajiv Otero RN  Outcome: Progressing     Problem: Pain  Goal: Verbalizes/displays adequate comfort level or baseline comfort level  7/25/2024 1745 by Rajiv Otero RN  Outcome: Adequate for Discharge  7/25/2024 0819 by Rajiv Otero RN  Outcome: Progressing     Problem: Skin/Tissue Integrity  Goal: Absence of new skin breakdown  Description: 1.  Monitor for areas of redness and/or skin breakdown  2.  Assess vascular access sites hourly  3.  Every 4-6 hours minimum:  Change oxygen saturation probe site  4.  Every 4-6 hours:  If on nasal continuous positive airway pressure, respiratory therapy assess nares and determine need for appliance change or resting period.  7/25/2024 1745 by Rajiv Otero RN  Outcome: Adequate for Discharge  7/25/2024 0819 by Rajiv Otero RN  Outcome: Progressing     Problem: ABCDS Injury Assessment  Goal: Absence of physical injury  7/25/2024 1745 by Rajiv Otero RN  Outcome: Adequate for Discharge  7/25/2024 0819 by Rajiv Otero RN  Outcome: Progressing     Problem: Neurosensory - Adult  Goal: Achieves stable or improved neurological status  7/25/2024 1745 by Rajiv Otero RN  Outcome: Adequate for Discharge  7/25/2024 0819 by Rajiv Otero RN  Outcome: Progressing     Problem: Cardiovascular - Adult  Goal: Maintains optimal cardiac

## 2024-07-25 NOTE — PLAN OF CARE
Problem: Safety - Adult  Goal: Free from fall injury  Outcome: Progressing     Problem: Chronic Conditions and Co-morbidities  Goal: Patient's chronic conditions and co-morbidity symptoms are monitored and maintained or improved  Outcome: Progressing  Flowsheets (Taken 7/24/2024 2000)  Care Plan - Patient's Chronic Conditions and Co-Morbidity Symptoms are Monitored and Maintained or Improved:   Monitor and assess patient's chronic conditions and comorbid symptoms for stability, deterioration, or improvement   Collaborate with multidisciplinary team to address chronic and comorbid conditions and prevent exacerbation or deterioration   Update acute care plan with appropriate goals if chronic or comorbid symptoms are exacerbated and prevent overall improvement and discharge     Problem: Discharge Planning  Goal: Discharge to home or other facility with appropriate resources  Outcome: Progressing  Flowsheets (Taken 7/24/2024 2000)  Discharge to home or other facility with appropriate resources:   Identify barriers to discharge with patient and caregiver   Arrange for needed discharge resources and transportation as appropriate   Identify discharge learning needs (meds, wound care, etc)   Refer to discharge planning if patient needs post-hospital services based on physician order or complex needs related to functional status, cognitive ability or social support system     Problem: Pain  Goal: Verbalizes/displays adequate comfort level or baseline comfort level  Outcome: Progressing     Problem: Skin/Tissue Integrity  Goal: Absence of new skin breakdown  Description: 1.  Monitor for areas of redness and/or skin breakdown  2.  Assess vascular access sites hourly  3.  Every 4-6 hours minimum:  Change oxygen saturation probe site  4.  Every 4-6 hours:  If on nasal continuous positive airway pressure, respiratory therapy assess nares and determine need for appliance change or resting period.  Outcome: Progressing

## 2024-07-25 NOTE — FLOWSHEET NOTE
07/25/24 1200   Treatment Team Notification   Reason for Communication Evaluate;Review case   Name of Team Member Notified Dr. Banda   Treatment Team Role Consulting Provider   Method of Communication Call   Response No new orders       Writer spoke with Dr. Banda via telephone. Pt is to follow up with Dr. Garzon in 1 week and dressing should be removed before discharge.

## 2024-07-25 NOTE — PROGRESS NOTES
Occupational Therapy  Facility/Department: [facility]  Occupational Therapy Re-Assessment    Name: Keith Oreilly  : 1940  MRN: [id number]  Date of Service: 2024    CELENA Vance reports patient is medically stable for therapy treatment this date.    Chart reviewed prior to treatment and patient is agreeable for therapy.  All lines intact and patient positioned comfortably at end of treatment.  All patient needs addressed prior to ending therapy session.      Discharge Recommendations:  Patient would benefit from continued therapy after discharge  Due to recent hospitalization and medical condition, pt would benefit from additional intermittent skilled therapy at time of discharge.  Please refer to the AM-PAC score for current functional status.     OT Equipment Recommendations  Equipment Needed: Yes  Mobility Devices: ADL Assistive Devices  ADL Assistive Devices: Emergency Alert System;Long-handled Shoe Horn;Long-handled Sponge;Reacher;Sock-Aid Hard       Patient Diagnosis(es): The primary encounter diagnosis was Bradycardia. Diagnoses of General weakness and Fall, initial encounter were also pertinent to this visit.  Past Medical History:  has a past medical history of Arthritis, Atrial fibrillation (HCC), CAD (coronary artery disease), CHF (congestive heart failure) (HCC), Diabetes (HCC), Diverticulosis, Hyperlipidemia, Obesity, АННА on CPAP, Poor historian, and Renal stone.  Past Surgical History:  has a past surgical history that includes Appendectomy; other surgical history; Colonoscopy; Cardiac surgery; Cardiac catheterization (2020); joint replacement; and Bunionectomy (Right, 12/3/2020).     PER H&P:Keith Oreilly is a 84 y.o. male who presents to the emergency department with complaints of weakness and fall.  Patient has a past medical history of tachybradycardia syndrome, diabetes mellitus with poor control, lacunar infarct, hypertension, CAD, Alzheimer's dementia with agitation,      Goals  Short Term Goals  Time Frame for Short Term Goals: By discharge, pt to demo  Short Term Goal 1: bed mob tasks with use of rail as needed to CGA.  Short Term Goal 2: increase BUE strength by a 1/2 grade to assist with ADL's.  Short Term Goal 3: UB ADL to set up and LB ADL to mod assist of 1 and use of AD/AE as needed.  Short Term Goal 4: ADL transfers and functional mob with AD to Mod I. (Modified at Reassessment by Lucille Lockhart OTR/L)  Short Term Goal 5: toileting tasks with use of BSC/AD and grab bar as needed to SBA (Modified at Reassessment by Lucille Lockhart OTR/L)  Long Term Goals  Long Term Goal 1: Pt to stand with SBA and AD ash > 10 mins as able to reduce falls in function. (Modified at Reassessment by Lucille Lockhart OTR/L)  Long Term Goal 2: Caregivers to be I with pressure relief, EC/WS and fall prevention tech, surgical precautions, edema mgt, BUE HEP, and DME/AE and AD recommendations with use of handouts. (Modified at Reassessment by Lucille Lockhart OTR/L)  Patient Goals   Patient goals : To go home and be able to garden!       Therapy Time   Individual Concurrent Group Co-treatment   Time In 1304         Time Out 1344         Minutes 40          Tx time: 30 min       Lucille LOCKHART OT

## 2024-07-25 NOTE — DISCHARGE SUMMARY
DISCHARGE SUMMARY  [facility]    Adult Hospitalist      Patient ID: Keith Oreilly  MRN: [id number]     Acct:  [id number]       Patient's PCP: Chaparrita Barrios MD    Admit Date: 7/22/2024     Discharge Date:   7/25/2024    Admitting Physician: Maddie Hazel MD    Discharge Physician: Maddie Hazel MD     CONSULTANTS: Patient Care Team:  Chaparrita Barrios MD as PCP - General  Omid Levin DPM as Physician (Podiatry)  Azeb Levin DPM as Physician (Podiatry)    PROCEDURES PERFORMED: Pacemaker placement        Primary Problem  Bradycardia      HPI:     84-year-old gentleman presented to ER complaining of fall and generalized weakness.  Patient has had a fall at home prior to his presentation.  He landed on his right side.  He also stated he has had a fall previously outside in the yard on his left side.  He was complaining of left shoulder pain.  On presentation patient noted to be bradycardic.  He denies any chest pain, fever, chills, cough, cold, changes in urination, bowel habit or rash.     Patient admitted for further management.          Active Discharge Diagnoses with hospital course:    Tachybradycardia syndrome  Patient presented with bradycardia  Hold AV ceci blocking agents  Monitor heart rate  Cardiology consult, plan for pacemaker placement  on 7/24/2024           Elevated troponin  Trend Trop:  Cardiology consult              Hypertensive heart disease  Monitor blood pressure  Hydralazine as needed  Continue antihypertensives as below           Coronary artery disease  Stable           Diabetes type 2  Monitor blood glucose  SSI           Chronic diastolic CHF  Euvolemic           Obstructive sleep apnea  On CPAP           Alzheimer's dementia  On Namenda and Seroquel           History of recent lacunar infarct  Stable             At the time of discharge patient was hemodynamically stable and asymptomatic.    The plan was discussed in detail with patient who agreed with the plan and  ADVIL;MOTRIN     mometasone 0.1 % cream  Commonly known as: ELOCON               Where to Get Your Medications        These medications were sent to [id number] - [place], [place] - [address] - P [phone] - F [phone]  [address] [place] [address]      Phone: [phone]   doxazosin 4 MG tablet  losartan 100 MG tablet  metoprolol succinate 25 MG extended release tablet         Code Status:  Full Code    Time Spent on discharge is  35 mins in patient examination, evaluation, counseling as well as medication reconciliation, prescriptions for required medications, discharge plan and follow up.    Electronically signed by Maddie Hazel MD on 7/25/2024 at 3:10 PM     Thank you Chaparrita Hernández MD for the opportunity to be involved in this patient's care.    This note was created with the assistance of a speech-recognition program.  Although the intention is to generate a document that actually reflects the content of the visit, no guarantees can be provided that every mistake has been identified and corrected by editing.     Note was updated later by me after  physical examination and  completion of the assessment.

## 2024-07-25 NOTE — PROGRESS NOTES
CLINICAL PHARMACY NOTE: MEDS TO BEDS    Total # of Prescriptions Filled: 3   The following medications were delivered to the patient:  Losartan 100 mg tabs  Metoprolol Succinate 25 mg tabs  Doxazosin 4 mg tabs    Additional Documentation:     The pt has dementia and had no money/family members with him, so we coordinated to pay the copays with the pharmacy's \"keep the change/pay it forward\" fund and we delivered the meds to the nurse's station.

## 2024-07-25 NOTE — PROGRESS NOTES
Physical Therapy  Facility/Department: [facility]  Physical Therapy UPDATE after PPM on 24    Name: Keith Oreilly  : 1940  MRN: [id number]  Date of Service: 2024    Discharge Recommendations:  Patient would benefit from continued therapy after discharge, 24 hour supervision or assist, Continue to assess pending progress              Assessment   GOALS AND restrictions updated s/p PPM on 24         Restrictions  Restrictions/Precautions  Restrictions/Precautions: General Precautions, Fall Risk  Implants present? : Pacemaker  Position Activity Restriction  Other position/activity restrictions: Up as tolerated, Heels off bed at all times, telemetry, RUE IV, PACEMAKER precautions s/p PPM on 24, ALARMS            Goals  Short Term Goals  Time Frame for Short Term Goals: 12 visits  Short Term Goal 1: Inc bed-mobility & transfers to independent to enable pt to safely get in/OOB & chair to return to PLOF & decrease risk for falls  Short Term Goal 2: Inc gait to amb 150ft or > indep w/ RW to enable pt to return to previous level of independence & able to demonstrate indep/ safe use of AD in functional activities including approaching surfaces and turning to sit.  Short Term Goal 3: Inc strength to WFL & standing balance to good with device to facilitate pt independence for performance of ADL's & functional mobility, & reduce fall risk  Short Term Goal 4: Pt able to tolerate 30 min of activity to include 15-20 reps of ex, balance and endurance training, & functional mobility with device to facilitate activity tolerance to WFL  Short Term Goal 5: Ed pt on home ex's, safety, balance & endurance training, pacemaker precautions after PPM on 24, pressure relief with Pt able to demonstrate effective pressure relief techniques to reduce risk of skin breakdown, fall prevention, & issue written Pt Ed  Patient Goals   Patient Goals : return home, regain independence       MAXWELL HER, PT

## 2024-07-25 NOTE — PROGRESS NOTES
Writer attempted to call radiology to confirm patient's x-ray, on both numbers, [phone] and [phone], with no answer at either number.

## 2024-07-25 NOTE — PLAN OF CARE
Problem: Safety - Adult  Goal: Free from fall injury  7/25/2024 0819 by Rajiv Otero RN  Outcome: Progressing  7/25/2024 0331 by Tereza Dunn RN  Outcome: Progressing     Problem: Chronic Conditions and Co-morbidities  Goal: Patient's chronic conditions and co-morbidity symptoms are monitored and maintained or improved  7/25/2024 0819 by Rajiv Otero RN  Outcome: Progressing  7/25/2024 0331 by Tereza Dunn RN  Outcome: Progressing  Flowsheets (Taken 7/24/2024 2000)  Care Plan - Patient's Chronic Conditions and Co-Morbidity Symptoms are Monitored and Maintained or Improved:   Monitor and assess patient's chronic conditions and comorbid symptoms for stability, deterioration, or improvement   Collaborate with multidisciplinary team to address chronic and comorbid conditions and prevent exacerbation or deterioration   Update acute care plan with appropriate goals if chronic or comorbid symptoms are exacerbated and prevent overall improvement and discharge     Problem: Discharge Planning  Goal: Discharge to home or other facility with appropriate resources  7/25/2024 0819 by Rajiv Otero RN  Outcome: Progressing  7/25/2024 0331 by Tereza Dunn RN  Outcome: Progressing  Flowsheets (Taken 7/24/2024 2000)  Discharge to home or other facility with appropriate resources:   Identify barriers to discharge with patient and caregiver   Arrange for needed discharge resources and transportation as appropriate   Identify discharge learning needs (meds, wound care, etc)   Refer to discharge planning if patient needs post-hospital services based on physician order or complex needs related to functional status, cognitive ability or social support system     Problem: Pain  Goal: Verbalizes/displays adequate comfort level or baseline comfort level  7/25/2024 0819 by Rajiv Otero RN  Outcome: Progressing  7/25/2024 0331 by Tereza Dunn RN  Outcome: Progressing     Problem: Skin/Tissue Integrity  Goal:

## 2024-07-25 NOTE — PROGRESS NOTES
End Of Shift Note  [facility]  Summary of shift: Patient's blood pressure was elevated with SBP above 160.  PRN Hydralazine 10mg IV was given at 0105, with good improvement in patient's blood pressure. Patient was up multiple times during the night to urinate.  Patient was a stand-by with walker.  Patient had trouble getting up from the toilet, but was able to unassisted.  Plan for post-pacer chest x-ray in AM, and probable discharge to [facility].    Vitals:    Vitals:    07/25/24 0007 07/25/24 0042 07/25/24 0145 07/25/24 0400   BP: (!) 164/70 (!) 177/73 (!) 145/60 (!) 157/69   Pulse: 70 70 70 70   Resp:    14   Temp:    97.5 °F (36.4 °C)   TempSrc:    Temporal   SpO2: 98% 99% 97% 98%   Weight:    109.5 kg (241 lb 6.4 oz)   Height:            I&O:   Intake/Output Summary (Last 24 hours) at 7/25/2024 0614  Last data filed at 7/24/2024 1541  Gross per 24 hour   Intake --   Output 10 ml   Net -10 ml       Resp Status: Room Air    Ventilator Settings:     / / /     Critical Care IV infusions:   sodium chloride      sodium chloride      dextrose          LDA:   Peripheral IV 07/24/24 Left;Proximal;Anterior Forearm (Active)   Number of days: 0       Wound Arm Lower;Posterior;Right (Active)   Number of days:        Wound 07/24/24 Chest Left;Upper (Active)   Number of days: 0       Incision 12/03/20 Foot Right (Active)   Number of days: 1330

## 2024-07-25 NOTE — PROGRESS NOTES
Subjective: Denies CP or dyspnea. Would like to go home.     VS: BP (!) 154/71 Comment: Simultaneous filing. User may not have seen previous data.  Pulse 72 Comment: Simultaneous filing. User may not have seen previous data.  Temp 97.1 °F (36.2 °C) (Temporal) Comment (Src): Simultaneous filing. User may not have seen previous data.  Resp 16   Ht 1.803 m (5' 11\")   Wt 109.5 kg (241 lb 6.4 oz)   SpO2 98% Comment: Simultaneous filing. User may not have seen previous data.  BMI 33.67 kg/m²     Wt:     I/O: In: -   Out: 10     Monitor: Vpaced    Meds: Scheduled Meds:   Vitamin D  2,000 Units Oral Daily    omega-3 acid ethyl esters  1 capsule Oral Daily    therapeutic multivitamin-minerals  1 tablet Oral Daily    vitamin B and C  1 tablet Oral Daily    metFORMIN  1,000 mg Oral BID WC    sodium chloride flush  5-40 mL IntraVENous 2 times per day    fenofibrate  54 mg Oral Daily    atorvastatin  80 mg Oral Daily    [START ON 7/27/2024] dulaglutide  1.5 mg SubCUTAneous Weekly    furosemide  20 mg Oral Once per day on Mon Wed Fri    glipiZIDE  10 mg Oral QAM AC    memantine  5 mg Oral Daily    QUEtiapine  25 mg Oral Nightly    sodium chloride flush  10 mL IntraVENous 2 times per day    insulin lispro  0-8 Units SubCUTAneous TID WC    insulin lispro  0-4 Units SubCUTAneous Nightly     Continuous Infusions:   sodium chloride      sodium chloride      dextrose       PRN Meds:.fluticasone, sodium chloride flush, sodium chloride, sodium chloride flush, sodium chloride, potassium chloride **OR** potassium alternative oral replacement **OR** potassium chloride, magnesium sulfate, ondansetron **OR** ondansetron, senna, acetaminophen **OR** acetaminophen, hydrALAZINE, glucose, dextrose bolus **OR** dextrose bolus, glucagon (rDNA), dextrose     Exam:General Appearance: Alert, NAD  Skin: warm, dry, left chest incision intact with steristrips, no drainage or hematoma.   Pulmonary/Chest: CTA  Cardiovascular: Paced, negative  JVD  Extremities: No peripheral edema    Labs:     CBC with Differential:    Lab Results   Component Value Date/Time    WBC 8.5 07/25/2024 03:25 AM    RBC 4.92 07/25/2024 03:25 AM    HGB 13.6 07/25/2024 03:25 AM    HCT 42.2 07/25/2024 03:25 AM     07/25/2024 03:25 AM    MCV 85.8 07/25/2024 03:25 AM    MCH 27.6 07/25/2024 03:25 AM    MCHC 32.2 07/25/2024 03:25 AM    RDW 14.7 07/25/2024 03:25 AM    LYMPHOPCT 26 07/25/2024 03:25 AM    MONOPCT 7 07/25/2024 03:25 AM    EOSPCT 4 07/25/2024 03:25 AM    BASOPCT 1 07/25/2024 03:25 AM    MONOSABS 0.56 07/25/2024 03:25 AM    LYMPHSABS 2.17 07/25/2024 03:25 AM    EOSABS 0.30 07/25/2024 03:25 AM    BASOSABS 0.05 07/25/2024 03:25 AM    DIFFTYPE NOT REPORTED 11/16/2020 03:54 PM         BMP:    Lab Results   Component Value Date/Time     07/25/2024 03:25 AM    K 3.7 07/25/2024 03:25 AM     07/25/2024 03:25 AM    CO2 24 07/25/2024 03:25 AM    BUN 19 07/25/2024 03:25 AM    CREATININE 1.0 07/25/2024 03:25 AM    CALCIUM 9.0 07/25/2024 03:25 AM    GFRAA >60 11/16/2020 03:54 PM    LABGLOM 75 07/25/2024 03:25 AM    LABGLOM 67 04/19/2024 06:45 AM    GLUCOSE 122 07/25/2024 03:25 AM           A/P: 1. CAD  -No angina. On ASA and Statin. Add B. Blocker.      2. HTN  -Mildly hypertensive. Resume home BP medications.       3. Chronic afib/flutter with tachybrady syndrome  -S/P PPM placement. Ok to resume Eliquis.      4. History of CHF  -Chronic LV diastolic dysfunction. Compensated.      5. Mixed hyperlipidemia  -On Statin, Fenofibrate, and fish oil.       No objection to DC from CV standpoint. F/U in 1 week for wound check.        Will discuss with Dr. Garzon    Electronically signed by DOM Harvey CNP on 7/25/2024 at 12:00 PM

## 2024-07-25 NOTE — CARE COORDINATION
DC Planning    Spoke with dtr by phone, PT worked w pt, did very well-recs HHPT, agrees Pretty. She would like to make sure Dr. Gazron is ok w discharge. Ref sent to [facility]-they can accept per Kalpana.     Plan for dc today to home w Pretty if ok w cardio.

## 2024-07-25 NOTE — FLOWSHEET NOTE
07/25/24 1833   AVS Reviewed   AVS & discharge instructions reviewed with patient and/or representative? Yes   Reviewed instructions with Patient;Other (name and relationship in comment)  (Daughter (Salma))   Level of Understanding Questions answered;Teach back completed;Verbalized understanding       Discharge Note:      All discharge instructions given at this time as well as all patient belongings returned to patient. Pt denies any further questions regarding discharge at this time. Pt given discharge packet with post pacemaker instructions discharge instructions/restrictions and medication handouts regarding all discharge medications and side effects. Pt denies any further issues at this time.    Pt wheeled out to front discharge doors at this time.     Pt left premises without any issues in private vehicle at this time.

## 2025-04-16 ENCOUNTER — APPOINTMENT (OUTPATIENT)
Dept: GENERAL RADIOLOGY | Age: 85
End: 2025-04-16
Payer: MEDICARE

## 2025-04-16 ENCOUNTER — HOSPITAL ENCOUNTER (EMERGENCY)
Age: 85
Discharge: HOME | End: 2025-04-17
Attending: EMERGENCY MEDICINE
Payer: MEDICARE

## 2025-04-16 ENCOUNTER — APPOINTMENT (OUTPATIENT)
Dept: CT IMAGING | Age: 85
End: 2025-04-16
Payer: MEDICARE

## 2025-04-16 VITALS
DIASTOLIC BLOOD PRESSURE: 71 MMHG | TEMPERATURE: 98.2 F | BODY MASS INDEX: 33.6 KG/M2 | HEART RATE: 79 BPM | RESPIRATION RATE: 16 BRPM | SYSTOLIC BLOOD PRESSURE: 145 MMHG | HEIGHT: 71 IN | WEIGHT: 240 LBS | OXYGEN SATURATION: 94 %

## 2025-04-16 DIAGNOSIS — R53.83 OTHER FATIGUE: ICD-10-CM

## 2025-04-16 DIAGNOSIS — N30.00 ACUTE CYSTITIS WITHOUT HEMATURIA: ICD-10-CM

## 2025-04-16 DIAGNOSIS — L03.211 CELLULITIS, FACE: Primary | ICD-10-CM

## 2025-04-16 LAB
ANION GAP SERPL CALCULATED.3IONS-SCNC: 16 MMOL/L (ref 9–16)
BASOPHILS # BLD: 0.05 K/UL (ref 0–0.2)
BASOPHILS NFR BLD: 0 % (ref 0–2)
BILIRUB UR QL STRIP: ABNORMAL
BUN SERPL-MCNC: 22 MG/DL (ref 8–23)
CALCIUM SERPL-MCNC: 9.3 MG/DL (ref 8.8–10.2)
CASTS #/AREA URNS LPF: ABNORMAL /LPF
CASTS #/AREA URNS LPF: ABNORMAL /LPF
CHLORIDE SERPL-SCNC: 99 MMOL/L (ref 98–107)
CLARITY UR: CLEAR
CO2 SERPL-SCNC: 20 MMOL/L (ref 20–31)
COLOR UR: YELLOW
CREAT SERPL-MCNC: 0.9 MG/DL (ref 0.7–1.2)
EOSINOPHIL # BLD: 0.1 K/UL (ref 0–0.44)
EOSINOPHILS RELATIVE PERCENT: 1 % (ref 1–4)
EPI CELLS #/AREA URNS HPF: ABNORMAL /HPF (ref 0–5)
ERYTHROCYTE [DISTWIDTH] IN BLOOD BY AUTOMATED COUNT: 14.3 % (ref 11.8–14.4)
FLUAV RNA RESP QL NAA+PROBE: NOT DETECTED
FLUBV RNA RESP QL NAA+PROBE: NOT DETECTED
GFR, ESTIMATED: 80 ML/MIN/1.73M2
GLUCOSE SERPL-MCNC: 165 MG/DL (ref 82–115)
GLUCOSE UR STRIP-MCNC: NEGATIVE MG/DL
HCT VFR BLD AUTO: 41.8 % (ref 40.7–50.3)
HGB BLD-MCNC: 14.3 G/DL (ref 13–17)
HGB UR QL STRIP.AUTO: ABNORMAL
IMM GRANULOCYTES # BLD AUTO: 0.07 K/UL (ref 0–0.3)
IMM GRANULOCYTES NFR BLD: 1 %
INR PPP: 1.1
KETONES UR STRIP-MCNC: ABNORMAL MG/DL
LEUKOCYTE ESTERASE UR QL STRIP: NEGATIVE
LYMPHOCYTES NFR BLD: 1.45 K/UL (ref 1.1–3.7)
LYMPHOCYTES RELATIVE PERCENT: 11 % (ref 24–43)
MAGNESIUM SERPL-MCNC: 1.6 MG/DL (ref 1.6–2.4)
MCH RBC QN AUTO: 28.9 PG (ref 25.2–33.5)
MCHC RBC AUTO-ENTMCNC: 34.2 G/DL (ref 28.4–34.8)
MCV RBC AUTO: 84.6 FL (ref 82.6–102.9)
MONOCYTES NFR BLD: 1.19 K/UL (ref 0.1–1.2)
MONOCYTES NFR BLD: 9 % (ref 3–12)
MUCOUS THREADS URNS QL MICRO: ABNORMAL
NEUTROPHILS NFR BLD: 78 % (ref 36–65)
NEUTS SEG NFR BLD: 9.83 K/UL (ref 1.5–8.1)
NITRITE UR QL STRIP: POSITIVE
NRBC BLD-RTO: 0 PER 100 WBC
PARTIAL THROMBOPLASTIN TIME: 31 SEC (ref 23.9–33.8)
PH UR STRIP: 5.5 [PH] (ref 5–8)
PHOSPHATE SERPL-MCNC: 1.8 MG/DL (ref 2.5–4.5)
PLATELET # BLD AUTO: 245 K/UL (ref 138–453)
PMV BLD AUTO: 9.6 FL (ref 8.1–13.5)
POTASSIUM SERPL-SCNC: 3.7 MMOL/L (ref 3.7–5.3)
PROT UR STRIP-MCNC: ABNORMAL MG/DL
PROTHROMBIN TIME: 14.7 SEC (ref 11.5–14.2)
RBC # BLD AUTO: 4.94 M/UL (ref 4.21–5.77)
RBC #/AREA URNS HPF: ABNORMAL /HPF (ref 0–2)
SARS-COV-2 RNA RESP QL NAA+PROBE: NOT DETECTED
SODIUM SERPL-SCNC: 135 MMOL/L (ref 136–145)
SOURCE: NORMAL
SP GR UR STRIP: 1.04 (ref 1–1.03)
SPECIMEN DESCRIPTION: NORMAL
TROPONIN I SERPL HS-MCNC: 26 NG/L (ref 0–22)
TROPONIN I SERPL HS-MCNC: 26 NG/L (ref 0–22)
UROBILINOGEN UR STRIP-ACNC: ABNORMAL EU/DL (ref 0–1)
WBC #/AREA URNS HPF: ABNORMAL /HPF (ref 0–5)
WBC OTHER # BLD: 12.7 K/UL (ref 3.5–11.3)

## 2025-04-16 PROCEDURE — 85610 PROTHROMBIN TIME: CPT

## 2025-04-16 PROCEDURE — 99285 EMERGENCY DEPT VISIT HI MDM: CPT

## 2025-04-16 PROCEDURE — 85025 COMPLETE CBC W/AUTO DIFF WBC: CPT

## 2025-04-16 PROCEDURE — 80048 BASIC METABOLIC PNL TOTAL CA: CPT

## 2025-04-16 PROCEDURE — 84100 ASSAY OF PHOSPHORUS: CPT

## 2025-04-16 PROCEDURE — 87636 SARSCOV2 & INF A&B AMP PRB: CPT

## 2025-04-16 PROCEDURE — 70450 CT HEAD/BRAIN W/O DYE: CPT

## 2025-04-16 PROCEDURE — 85730 THROMBOPLASTIN TIME PARTIAL: CPT

## 2025-04-16 PROCEDURE — 81001 URINALYSIS AUTO W/SCOPE: CPT

## 2025-04-16 PROCEDURE — 93005 ELECTROCARDIOGRAM TRACING: CPT | Performed by: EMERGENCY MEDICINE

## 2025-04-16 PROCEDURE — 71045 X-RAY EXAM CHEST 1 VIEW: CPT

## 2025-04-16 PROCEDURE — 6370000000 HC RX 637 (ALT 250 FOR IP): Performed by: EMERGENCY MEDICINE

## 2025-04-16 PROCEDURE — 83735 ASSAY OF MAGNESIUM: CPT

## 2025-04-16 PROCEDURE — 84484 ASSAY OF TROPONIN QUANT: CPT

## 2025-04-16 RX ORDER — CEPHALEXIN 500 MG/1
500 CAPSULE ORAL ONCE
Status: COMPLETED | OUTPATIENT
Start: 2025-04-16 | End: 2025-04-16

## 2025-04-16 RX ORDER — ACETAMINOPHEN 325 MG/1
650 TABLET ORAL ONCE
Status: COMPLETED | OUTPATIENT
Start: 2025-04-16 | End: 2025-04-16

## 2025-04-16 RX ADMIN — ACETAMINOPHEN 650 MG: 325 TABLET, FILM COATED ORAL at 21:18

## 2025-04-16 RX ADMIN — CEPHALEXIN 500 MG: 500 CAPSULE ORAL at 21:18

## 2025-04-16 ASSESSMENT — ENCOUNTER SYMPTOMS
NAUSEA: 0
VOMITING: 0
COUGH: 0
SHORTNESS OF BREATH: 0
TROUBLE SWALLOWING: 0
COLOR CHANGE: 0
ABDOMINAL PAIN: 0
PHOTOPHOBIA: 0
DIARRHEA: 0

## 2025-04-16 ASSESSMENT — PAIN - FUNCTIONAL ASSESSMENT: PAIN_FUNCTIONAL_ASSESSMENT: 0-10

## 2025-04-17 LAB
EKG Q-T INTERVAL: 430 MS
EKG QRS DURATION: 160 MS
EKG QTC CALCULATION (BAZETT): 499 MS
EKG R AXIS: 108 DEGREES
EKG T AXIS: -74 DEGREES
EKG VENTRICULAR RATE: 81 BPM

## 2025-04-17 PROCEDURE — 2500000003 HC RX 250 WO HCPCS: Performed by: EMERGENCY MEDICINE

## 2025-04-17 PROCEDURE — 6360000002 HC RX W HCPCS: Performed by: EMERGENCY MEDICINE

## 2025-04-17 PROCEDURE — 96374 THER/PROPH/DIAG INJ IV PUSH: CPT

## 2025-04-17 RX ORDER — CEPHALEXIN 500 MG/1
500 CAPSULE ORAL 4 TIMES DAILY
Qty: 19 CAPSULE | Refills: 0 | Status: SHIPPED | OUTPATIENT
Start: 2025-04-17 | End: 2025-04-22

## 2025-04-17 RX ADMIN — WATER 1000 MG: 1 INJECTION INTRAMUSCULAR; INTRAVENOUS; SUBCUTANEOUS at 00:10

## 2025-04-17 ASSESSMENT — HEART SCORE: ECG: NORMAL

## 2025-04-17 NOTE — ED NOTES
Pt presents to ED via private auto with c/o facial swelling and redness, onset Monday. Daughter states pt has been more fatigued the past few days. Redness and swelling noted to forehead, cheeks and right ear.  Pt afebrile, vitals stable. Pt denies difficultly swallowing. Even, non-labored breathing. Family at bedside.

## 2025-04-17 NOTE — ED PROVIDER NOTES
immediately if symptoms worsen or change.  Patient and his daughter understand and agree with the plan.    CRITICAL CARE:       PROCEDURES:    Procedures      DATA FOR LAB AND RADIOLOGY TESTS ORDERED BELOW ARE REVIEWED BY THE ED CLINICIAN:    RADIOLOGY: All x-rays, CT, MRI, and formal ultrasound images (except ED bedside ultrasound) are read by the radiologist, see reports below, unless otherwise noted in MDM or here.  Reports below are reviewed by myself.  CT HEAD WO CONTRAST   Final Result   No acute intracranial hemorrhage, mass effect, midline shift, or   hydrocephalus.      Chronic ischemic changes in the white matter and areas of old lacunar infarct   are redemonstrated.  No convincing area of acute territorial infarct.         XR CHEST PORTABLE   Final Result      No acute lung disease             LABS: Lab orders shown below, the results are reviewed by myself, and all abnormals are listed below.  Labs Reviewed   TROPONIN - Abnormal; Notable for the following components:       Result Value    Troponin, High Sensitivity 26 (*)     All other components within normal limits   TROPONIN - Abnormal; Notable for the following components:    Troponin, High Sensitivity 26 (*)     All other components within normal limits   PROTIME-INR - Abnormal; Notable for the following components:    Protime 14.7 (*)     All other components within normal limits   PHOSPHORUS - Abnormal; Notable for the following components:    Phosphorus 1.8 (*)     All other components within normal limits   BASIC METABOLIC PANEL - Abnormal; Notable for the following components:    Sodium 135 (*)     Glucose 165 (*)     All other components within normal limits   CBC WITH AUTO DIFFERENTIAL - Abnormal; Notable for the following components:    WBC 12.7 (*)     Neutrophils % 78 (*)     Lymphocytes % 11 (*)     Immature Granulocytes % 1 (*)     Neutrophils Absolute 9.83 (*)     All other components within normal limits   URINALYSIS WITH REFLEX TO

## (undated) DEVICE — DRAPE C ARM UNIV W41XL74IN CLR PLAS XR VELC CLSR POLY STRP

## (undated) DEVICE — PADDING,UNDERCAST,COTTON, 4"X4YD STERILE: Brand: MEDLINE

## (undated) DEVICE — TUBING, SUCTION, 1/4" X 12', STRAIGHT: Brand: MEDLINE

## (undated) DEVICE — 3M™ STERI-DRAPE™ INCISE DRAPE 1050 (60CM X 45CM): Brand: STERI-DRAPE™

## (undated) DEVICE — SPONGE LAP W18XL18IN WHT COT 4 PLY FLD STRUNG RADPQ DISP ST 2 PER PACK

## (undated) DEVICE — INTENDED FOR TISSUE SEPARATION, AND OTHER PROCEDURES THAT REQUIRE A SHARP SURGICAL BLADE TO PUNCTURE OR CUT.: Brand: BARD-PARKER ® CARBON RIB-BACK BLADES

## (undated) DEVICE — GARMENT,MEDLINE,DVT,INT,CALF,MED, GEN2: Brand: MEDLINE

## (undated) DEVICE — SUTURE VCRL SZ 0 L27IN ABSRB UD L26MM CT-2 1/2 CIR J270H

## (undated) DEVICE — GLOVE SURG SZ 8 CRM LTX FREE POLYISOPRENE POLYMER BEAD ANTI

## (undated) DEVICE — NDL CNTR 40CT FM MAG: Brand: MEDLINE INDUSTRIES, INC.

## (undated) DEVICE — SMARTGOWN SURGICAL GOWN, 3XL, LONG: Brand: CONVERTORS

## (undated) DEVICE — ZIMMER® STERILE DISPOSABLE TOURNIQUET CUFF WITH PLC, DUAL PORT, SINGLE BLADDER, 34 IN. (86 CM)

## (undated) DEVICE — DRAPE,U/ SHT,SPLIT,PLAS,STERIL: Brand: MEDLINE

## (undated) DEVICE — DRESSING PETRO W3XL8IN OIL EMUL N ADH GZ KNIT IMPREG CELOS

## (undated) DEVICE — SUTURE VICRYL 2-0 L27IN ABSRB CT BRAID COAT UD J275H

## (undated) DEVICE — SUTURE ETHLN SZ 3-0 L18IN NONABSORBABLE BLK PS-2 L19MM 3/8 1669H

## (undated) DEVICE — Z DISCONTINUED USE 2859063 SUTURE VICRYL 3-0 L27IN ABSRB UD PSL L30MM 1/2 CIR TAPERPOINT J502H

## (undated) DEVICE — SKIN PREP TRAY W/CHG: Brand: MEDLINE INDUSTRIES, INC.

## (undated) DEVICE — GAUZE,SPONGE,FLUFF,6"X6.75",STRL,5/TRAY: Brand: MEDLINE

## (undated) DEVICE — BNDG,ELSTC,MATRIX,STRL,6"X5YD,LF,HOOK&LP: Brand: MEDLINE

## (undated) DEVICE — PAD,ABDOMINAL,5"X9",ST,LF,25/BX: Brand: MEDLINE INDUSTRIES, INC.

## (undated) DEVICE — DRESSING FOAM W4XL4IN AG SIL FACE BORD IONIC ANTIMIC ADH

## (undated) DEVICE — Device

## (undated) DEVICE — MICROPUNCTURE, INTRODUCER SET SILHOUETTE, TRANSITIONLESS PUSH-PLUS DESIGN - STIFFENED CANNULA WITH NITINOL WIRE GUIDE: Brand: MICROPUNCTURE

## (undated) DEVICE — SPONGE LAP W18XL18IN WHT COT 4 PLY FLD STRUNG RADPQ DISP ST

## (undated) DEVICE — SMALL TEAR CROSS CUT RASP (11.0 X 5.0MM)

## (undated) DEVICE — TOWEL,OR,DSP,ST,BLUE,DLX,XR,4/PK,20PK/CS: Brand: MEDLINE

## (undated) DEVICE — 3M™ STERI-DRAPE™ U-DRAPE 1015: Brand: STERI-DRAPE™

## (undated) DEVICE — KIT MICRO INTRO 4FR STIFF 40CM NIGHTENALL TUNGSTEN 7SMT

## (undated) DEVICE — ELECTRODE PT RET AD L9FT HI MOIST COND ADH HYDRGEL CORDED

## (undated) DEVICE — APPLICATOR MEDICATED 26 CC SOLUTION HI LT ORNG CHLORAPREP

## (undated) DEVICE — STRIP WND CLSR W1 4XL4IN POLYAMIDE MACROPOROUS NONWOVEN H2O

## (undated) DEVICE — MEDI-TRACE CADENCE ADULT, DEFIBRILLATION ELECTRODE -RTS  (10 PR/PK) - PHYSIO-CONTROL: Brand: MEDI-TRACE CADENCE

## (undated) DEVICE — DUP USE 394429 BLADE SAW SAG OSCIL MED NAR 5.5MM

## (undated) DEVICE — SUTURE VCRL SZ 2-0 L27IN ABSRB UD L26MM CT-2 1/2 CIR J269H

## (undated) DEVICE — STRAP ARMBRD W1.5XL32IN FOAM STR YET SFT W/ HK AND LOOP

## (undated) DEVICE — GLOVE SURG SZ 85 L12IN FNGR THK79MIL GRN LTX FREE

## (undated) DEVICE — PACEMAKER CARD 20GM 10.4CC W50XH47MM THK6MM 2 CHMBR IS-1

## (undated) DEVICE — INTRODUCER LD L13CM OD6FR SPLITTABLE DIL W/ J TIP GWIRE SYR

## (undated) DEVICE — C-ARMOR C-ARM EQUIPMENT COVERS CLEAR STERILE UNIVERSAL FIT 12 PER CASE: Brand: C-ARMOR

## (undated) DEVICE — DRAPE,REIN 53X77,STERILE: Brand: MEDLINE

## (undated) DEVICE — BLANKET WRM W29.9XL79.1IN UP BODY FORC AIR MISTRAL-AIR